# Patient Record
Sex: FEMALE | Race: BLACK OR AFRICAN AMERICAN | NOT HISPANIC OR LATINO | Employment: FULL TIME | URBAN - METROPOLITAN AREA
[De-identification: names, ages, dates, MRNs, and addresses within clinical notes are randomized per-mention and may not be internally consistent; named-entity substitution may affect disease eponyms.]

---

## 2020-09-10 ENCOUNTER — HOSPITAL ENCOUNTER (OUTPATIENT)
Dept: RADIOLOGY | Facility: HOSPITAL | Age: 55
Discharge: HOME | End: 2020-09-10
Attending: INTERNAL MEDICINE
Payer: COMMERCIAL

## 2020-09-10 ENCOUNTER — OFFICE VISIT (OUTPATIENT)
Dept: RHEUMATOLOGY | Facility: CLINIC | Age: 55
End: 2020-09-10
Payer: COMMERCIAL

## 2020-09-10 VITALS
DIASTOLIC BLOOD PRESSURE: 85 MMHG | HEART RATE: 64 BPM | SYSTOLIC BLOOD PRESSURE: 131 MMHG | WEIGHT: 191.81 LBS | HEIGHT: 64 IN | BODY MASS INDEX: 32.74 KG/M2

## 2020-09-10 DIAGNOSIS — M54.2 NECK PAIN, ACUTE: ICD-10-CM

## 2020-09-10 DIAGNOSIS — M06.00 SERONEGATIVE RHEUMATOID ARTHRITIS: ICD-10-CM

## 2020-09-10 DIAGNOSIS — M79.643 PAIN OF HAND, UNSPECIFIED LATERALITY: ICD-10-CM

## 2020-09-10 DIAGNOSIS — G89.4 CHRONIC PAIN SYNDROME: ICD-10-CM

## 2020-09-10 DIAGNOSIS — M06.00 SERONEGATIVE RHEUMATOID ARTHRITIS: Primary | ICD-10-CM

## 2020-09-10 DIAGNOSIS — M15.9 OSTEOARTHRITIS OF MULTIPLE JOINTS, UNSPECIFIED OSTEOARTHRITIS TYPE: ICD-10-CM

## 2020-09-10 PROCEDURE — 77077 XR ARTHRITIS SURVEY: ICD-10-PCS | Mod: 26,,, | Performed by: RADIOLOGY

## 2020-09-10 PROCEDURE — 99999 PR PBB SHADOW E&M-NEW PATIENT-LVL III: CPT | Mod: PBBFAC,,, | Performed by: INTERNAL MEDICINE

## 2020-09-10 PROCEDURE — 96372 THER/PROPH/DIAG INJ SC/IM: CPT | Mod: 59,S$GLB,, | Performed by: INTERNAL MEDICINE

## 2020-09-10 PROCEDURE — 96372 PR INJECTION,THERAP/PROPH/DIAG2ST, IM OR SUBCUT: ICD-10-PCS | Mod: 59,S$GLB,, | Performed by: INTERNAL MEDICINE

## 2020-09-10 PROCEDURE — 99205 OFFICE O/P NEW HI 60 MIN: CPT | Mod: 25,S$GLB,, | Performed by: INTERNAL MEDICINE

## 2020-09-10 PROCEDURE — 99205 PR OFFICE/OUTPT VISIT, NEW, LEVL V, 60-74 MIN: ICD-10-PCS | Mod: 25,S$GLB,, | Performed by: INTERNAL MEDICINE

## 2020-09-10 PROCEDURE — 3008F PR BODY MASS INDEX (BMI) DOCUMENTED: ICD-10-PCS | Mod: CPTII,S$GLB,, | Performed by: INTERNAL MEDICINE

## 2020-09-10 PROCEDURE — 3008F BODY MASS INDEX DOCD: CPT | Mod: CPTII,S$GLB,, | Performed by: INTERNAL MEDICINE

## 2020-09-10 PROCEDURE — 77077 JOINT SURVEY SINGLE VIEW: CPT | Mod: TC,PO

## 2020-09-10 PROCEDURE — 77077 JOINT SURVEY SINGLE VIEW: CPT | Mod: 26,,, | Performed by: RADIOLOGY

## 2020-09-10 PROCEDURE — 99999 PR PBB SHADOW E&M-NEW PATIENT-LVL III: ICD-10-PCS | Mod: PBBFAC,,, | Performed by: INTERNAL MEDICINE

## 2020-09-10 RX ORDER — AMLODIPINE BESYLATE 5 MG/1
10 TABLET ORAL DAILY
COMMUNITY
End: 2023-08-28 | Stop reason: SDUPTHER

## 2020-09-10 RX ORDER — AMOXICILLIN 500 MG
CAPSULE ORAL DAILY
COMMUNITY

## 2020-09-10 RX ORDER — CETIRIZINE HYDROCHLORIDE 10 MG/1
10 TABLET ORAL DAILY
COMMUNITY
Start: 2020-08-29 | End: 2024-03-01 | Stop reason: SDUPTHER

## 2020-09-10 RX ORDER — KETOROLAC TROMETHAMINE 30 MG/ML
60 INJECTION, SOLUTION INTRAMUSCULAR; INTRAVENOUS
Status: COMPLETED | OUTPATIENT
Start: 2020-09-10 | End: 2020-09-10

## 2020-09-10 RX ORDER — L. ACIDOPHILUS/L.BULGARICUS 100MM CELL
1 GRANULES IN PACKET (EA) ORAL 2 TIMES DAILY
COMMUNITY

## 2020-09-10 RX ORDER — POTASSIUM CHLORIDE 1500 MG/1
20 TABLET, EXTENDED RELEASE ORAL DAILY
COMMUNITY
Start: 2020-07-29 | End: 2021-06-18 | Stop reason: SDUPTHER

## 2020-09-10 RX ORDER — GABAPENTIN 600 MG/1
600 TABLET ORAL 2 TIMES DAILY
COMMUNITY
Start: 2020-07-29 | End: 2022-07-29

## 2020-09-10 RX ORDER — CYANOCOBALAMIN 1000 UG/ML
1000 INJECTION, SOLUTION INTRAMUSCULAR; SUBCUTANEOUS
Status: COMPLETED | OUTPATIENT
Start: 2020-09-10 | End: 2020-09-10

## 2020-09-10 RX ORDER — METOPROLOL TARTRATE 25 MG/1
25 TABLET, FILM COATED ORAL DAILY
COMMUNITY
Start: 2020-06-15 | End: 2023-08-28 | Stop reason: SDUPTHER

## 2020-09-10 RX ORDER — HYDROCODONE BITARTRATE AND ACETAMINOPHEN 7.5; 325 MG/1; MG/1
1 TABLET ORAL EVERY 8 HOURS PRN
Qty: 90 TABLET | Refills: 0 | Status: SHIPPED | OUTPATIENT
Start: 2020-09-10 | End: 2020-10-10

## 2020-09-10 RX ORDER — SULFASALAZINE 500 MG/1
1000 TABLET, DELAYED RELEASE ORAL 2 TIMES DAILY
Qty: 120 TABLET | Refills: 3 | Status: SHIPPED | OUTPATIENT
Start: 2020-09-10 | End: 2020-10-10

## 2020-09-10 RX ORDER — PREDNISONE 2.5 MG/1
7.5 TABLET ORAL DAILY
Qty: 90 TABLET | Refills: 0 | Status: SHIPPED | OUTPATIENT
Start: 2020-09-10 | End: 2020-10-10

## 2020-09-10 RX ORDER — CHOLECALCIFEROL (VITAMIN D3) 25 MCG
1000 TABLET ORAL DAILY
COMMUNITY

## 2020-09-10 RX ORDER — METHYLPREDNISOLONE ACETATE 80 MG/ML
80 INJECTION, SUSPENSION INTRA-ARTICULAR; INTRALESIONAL; INTRAMUSCULAR; SOFT TISSUE
Status: COMPLETED | OUTPATIENT
Start: 2020-09-10 | End: 2020-09-10

## 2020-09-10 RX ORDER — DEXAMETHASONE SODIUM PHOSPHATE 4 MG/ML
4 INJECTION, SOLUTION INTRA-ARTICULAR; INTRALESIONAL; INTRAMUSCULAR; INTRAVENOUS; SOFT TISSUE
Status: COMPLETED | OUTPATIENT
Start: 2020-09-10 | End: 2020-09-10

## 2020-09-10 RX ORDER — FUROSEMIDE 20 MG/1
20 TABLET ORAL
COMMUNITY
Start: 2020-08-25 | End: 2023-09-27 | Stop reason: SDUPTHER

## 2020-09-10 RX ADMIN — DEXAMETHASONE SODIUM PHOSPHATE 4 MG: 4 INJECTION, SOLUTION INTRA-ARTICULAR; INTRALESIONAL; INTRAMUSCULAR; INTRAVENOUS; SOFT TISSUE at 01:09

## 2020-09-10 RX ADMIN — CYANOCOBALAMIN 1000 MCG: 1000 INJECTION, SOLUTION INTRAMUSCULAR; SUBCUTANEOUS at 01:09

## 2020-09-10 RX ADMIN — METHYLPREDNISOLONE ACETATE 80 MG: 80 INJECTION, SUSPENSION INTRA-ARTICULAR; INTRALESIONAL; INTRAMUSCULAR; SOFT TISSUE at 01:09

## 2020-09-10 RX ADMIN — KETOROLAC TROMETHAMINE 60 MG: 30 INJECTION, SOLUTION INTRAMUSCULAR; INTRAVENOUS at 01:09

## 2020-09-10 ASSESSMENT — ROUTINE ASSESSMENT OF PATIENT INDEX DATA (RAPID3)
MDHAQ FUNCTION SCORE: 1.1
TOTAL RAPID3 SCORE: 5.55
PSYCHOLOGICAL DISTRESS SCORE: 2.2
PAIN SCORE: 6.5
FATIGUE SCORE: 2.2
PATIENT GLOBAL ASSESSMENT SCORE: 6.5

## 2020-09-10 NOTE — PROGRESS NOTES
Subjective:       Patient ID: Susana Andersen is a 55 y.o. female.    Chief Complaint: Rheumatoid Arthritis    Hpi: pt is a 54 yo female with a h/o  Joint pain ,  Am gelling 30 min with a shower,  Patient complains of arthralgias and myalgias for which has been present for a few years. Pain is located in multiple joints, both shoulder(s), both elbow(s), both wrist(s), both MCP(s): 1st, 2nd, 3rd, 4th and 5th, both PIP(s): 1st, 2nd, 3rd, 4th and 5th, both DIP(s): 1st and 2nd, both hip(s), both knee(s) and both MTP(s): 1st, 2nd, 3rd, 4th and 5th, is described as aching, pulsating, shooting and throbbing, and is constant, moderate .  Associated symptoms include: crepitation, decreased range of motion, edema, effusion, tenderness and warmth.      Review of Systems   Constitutional: Negative for activity change, appetite change, chills, diaphoresis, fatigue, fever and unexpected weight change.   HENT: Negative for congestion, dental problem, ear discharge, ear pain, facial swelling, mouth sores, nosebleeds, postnasal drip, rhinorrhea, sinus pressure, sneezing, sore throat, tinnitus, trouble swallowing and voice change.    Eyes: Negative for photophobia, pain, discharge, redness and itching.   Respiratory: Negative for apnea, cough, chest tightness, shortness of breath and wheezing.    Cardiovascular: Positive for leg swelling. Negative for chest pain and palpitations.   Gastrointestinal: Negative for abdominal distention, abdominal pain, constipation, diarrhea, nausea and vomiting.   Endocrine: Negative for cold intolerance, heat intolerance, polydipsia and polyuria.   Genitourinary: Negative for decreased urine volume, difficulty urinating, dysuria, flank pain, frequency, hematuria and urgency.   Musculoskeletal: Positive for arthralgias, back pain, gait problem, joint swelling, myalgias, neck pain and neck stiffness.   Skin: Negative for pallor, rash and wound.   Allergic/Immunologic: Negative for immunocompromised  "state.   Neurological: Negative for dizziness, tremors, weakness, numbness and headaches.   Hematological: Negative for adenopathy. Does not bruise/bleed easily.   Psychiatric/Behavioral: Negative for sleep disturbance. The patient is not nervous/anxious.          Objective:   /85   Pulse 64   Ht 5' 4" (1.626 m)   Wt 87 kg (191 lb 12.8 oz)   BMI 32.92 kg/m²      Physical Exam   Nursing note and vitals reviewed.  Constitutional: She is oriented to person, place, and time and well-developed, well-nourished, and in no distress.   HENT:   Head: Normocephalic and atraumatic.   Mouth/Throat: Oropharynx is clear and moist.   Eyes: EOM are normal. Pupils are equal, round, and reactive to light.   Neck: Neck supple. No thyromegaly present.   Cardiovascular: Normal rate, regular rhythm and normal heart sounds.  Exam reveals no gallop and no friction rub.    No murmur heard.  Pulmonary/Chest: Breath sounds normal. She has no wheezes. She has no rales. She exhibits no tenderness.   Abdominal: There is no abdominal tenderness. There is no rebound and no guarding.       Right Side Rheumatological Exam     Examination finds the elbow normal.    The patient is tender to palpation of the shoulder, wrist, knee, 1st PIP, 1st MCP, 2nd PIP, 2nd MCP, 3rd PIP, 3rd MCP, 4th PIP, 4th MCP, 5th PIP and 5th MCP    She has swelling of the 1st PIP, 1st MCP, 2nd PIP, 2nd MCP, 3rd PIP, 3rd MCP, 4th PIP, 4th MCP, 5th PIP and 5th MCP    Shoulder Exam   Tenderness Location: no tenderness    Range of Motion   Active abduction: abnormal   Adduction: abnormal  Sensation: normal    Knee Exam   Patellofemoral Crepitus: positive  Effusion: negative  Sensation: normal    Hip Exam   Tenderness Location: posterior  Sensation: normal    Elbow/Wrist Exam   Tenderness Location: no tenderness  Sensation: normal    Left Side Rheumatological Exam     Examination finds the elbow normal.    The patient is tender to palpation of the shoulder, wrist, knee, " 1st PIP, 1st MCP, 2nd PIP, 2nd MCP, 3rd PIP, 3rd MCP, 4th PIP, 4th MCP, 5th PIP and 5th MCP.    She has swelling of the 1st PIP, 1st MCP, 2nd PIP, 2nd MCP, 3rd PIP, 3rd MCP, 4th PIP, 4th MCP, 5th PIP and 5th MCP    Shoulder Exam   Tenderness Location: no tenderness    Range of Motion   Active abduction: abnormal   Sensation: normal    Knee Exam     Patellofemoral Crepitus: positive  Effusion: negative  Sensation: normal    Hip Exam   Tenderness Location: posterior  Sensation: normal    Elbow/Wrist Exam   Sensation: normal      Back/Neck Exam   General Inspection   Gait: normal         Lymphadenopathy:     She has no cervical adenopathy.   Neurological: She is alert and oriented to person, place, and time. Gait normal.   Skin: No rash noted. No erythema. No pallor.     Psychiatric: Mood and affect normal.      Result Impression   1. No acute radiographic abnormalities.  2. Mild multilevel degenerative facet disease, with changes of mild degenerative disc disease at C5-6.    This report was signed by Danis Galaviz MD on 6/12/2018 6:59 AM.    Result Narrative   Cervical spine series    CLINICAL DATA: Neck pain    FINDINGS: AP, lateral, swimmer's, and odontoid views are submitted. The AP and odontoid views are limited by overlying hair pins, which patient would not remove.    There is no evidence of fracture or subluxation. Prevertebral soft tissues are normal. The odontoid is grossly normal.    There is mild loss of intervertebral disc height at C5-6 compatible with degenerative disc disease. Changes of mild multilevel degenerative facet disease are also noted.   Other Result Information   [facility], [facility] - 06/12/2018  7:01 AM CDT  Cervical spine series    CLINICAL DATA: Neck pain          Assessment:       1. Seronegative rheumatoid arthritis    2. Pain of hand, unspecified laterality    3. Neck pain, acute    4. Osteoarthritis of multiple joints, unspecified osteoarthritis  type    5. Chronic pain syndrome            Plan:       Susana was seen today for rheumatoid arthritis.    Diagnoses and all orders for this visit:    Seronegative rheumatoid arthritis  -     JENNIE Screen w/Reflex; Future  -     Anti Sm/RNP Antibody; Future  -     Anti-DNA antibody, double-stranded; Future  -     Anti-Histone Antibody; Future  -     Anti-Smith antibody; Future  -     Anti-thyroglobulin antibody; Future  -     CBC auto differential; Future  -     Comprehensive metabolic panel; Future  -     COVID-19 (SARS CoV-2) IgG Antibody; Future  -     C-Reactive Protein; Future  -     Cyclic Citrullinated Peptide Antibody, IgG; Future  -     Hepatitis B core antibody, IgM; Future  -     Hepatitis B Surface Ab, Qualitative; Future  -     Hepatitis B Surface Antigen; Future  -     Hepatitis C Antibody; Future  -     Sjogrens syndrome-A extractable nuclear antibody; Future  -     Sjogrens syndrome-B extractable nuclear antibody; Future  -     TSH; Future  -     Thyroid Peroxidase Antibody; Future  -     T4, free; Future  -     T3, free; Future  -     Sedimentation rate; Future  -     Rheumatoid factor; Future  -     Quantiferon Gold TB; Future  -     Vitamin D; Future  -     XR Arthritis Survey; Future  -     dexamethasone injection 4 mg  -     methylPREDNISolone acetate injection 80 mg  -     ketorolac injection 60 mg  -     cyanocobalamin injection 1,000 mcg  -     predniSONE (DELTASONE) 2.5 MG tablet; Take 3 tablets (7.5 mg total) by mouth once daily.  -     sulfaSALAzine (AZULFIDINE) 500 MG EC tablet; Take 2 tablets (1,000 mg total) by mouth 2 (two) times daily.  -     HYDROcodone-acetaminophen (NORCO) 7.5-325 mg per tablet; Take 1 tablet by mouth every 8 (eight) hours as needed.    Pain of hand, unspecified laterality  -     JENNIE Screen w/Reflex; Future  -     Anti Sm/RNP Antibody; Future  -     Anti-DNA antibody, double-stranded; Future  -     Anti-Histone Antibody; Future  -     Anti-Smith antibody; Future  -      Anti-thyroglobulin antibody; Future  -     CBC auto differential; Future  -     Comprehensive metabolic panel; Future  -     COVID-19 (SARS CoV-2) IgG Antibody; Future  -     C-Reactive Protein; Future  -     Cyclic Citrullinated Peptide Antibody, IgG; Future  -     Hepatitis B core antibody, IgM; Future  -     Hepatitis B Surface Ab, Qualitative; Future  -     Hepatitis B Surface Antigen; Future  -     Hepatitis C Antibody; Future  -     Sjogrens syndrome-A extractable nuclear antibody; Future  -     Sjogrens syndrome-B extractable nuclear antibody; Future  -     TSH; Future  -     Thyroid Peroxidase Antibody; Future  -     T4, free; Future  -     T3, free; Future  -     Sedimentation rate; Future  -     Rheumatoid factor; Future  -     Quantiferon Gold TB; Future  -     Vitamin D; Future  -     XR Arthritis Survey; Future  -     dexamethasone injection 4 mg  -     methylPREDNISolone acetate injection 80 mg  -     ketorolac injection 60 mg  -     cyanocobalamin injection 1,000 mcg  -     predniSONE (DELTASONE) 2.5 MG tablet; Take 3 tablets (7.5 mg total) by mouth once daily.  -     sulfaSALAzine (AZULFIDINE) 500 MG EC tablet; Take 2 tablets (1,000 mg total) by mouth 2 (two) times daily.  -     HYDROcodone-acetaminophen (NORCO) 7.5-325 mg per tablet; Take 1 tablet by mouth every 8 (eight) hours as needed.    Neck pain, acute  -     JENNIE Screen w/Reflex; Future  -     Anti Sm/RNP Antibody; Future  -     Anti-DNA antibody, double-stranded; Future  -     Anti-Histone Antibody; Future  -     Anti-Smith antibody; Future  -     Anti-thyroglobulin antibody; Future  -     CBC auto differential; Future  -     Comprehensive metabolic panel; Future  -     COVID-19 (SARS CoV-2) IgG Antibody; Future  -     C-Reactive Protein; Future  -     Cyclic Citrullinated Peptide Antibody, IgG; Future  -     Hepatitis B core antibody, IgM; Future  -     Hepatitis B Surface Ab, Qualitative; Future  -     Hepatitis B Surface Antigen;  Future  -     Hepatitis C Antibody; Future  -     Sjogrens syndrome-A extractable nuclear antibody; Future  -     Sjogrens syndrome-B extractable nuclear antibody; Future  -     TSH; Future  -     Thyroid Peroxidase Antibody; Future  -     T4, free; Future  -     T3, free; Future  -     Sedimentation rate; Future  -     Rheumatoid factor; Future  -     Quantiferon Gold TB; Future  -     Vitamin D; Future  -     XR Arthritis Survey; Future  -     dexamethasone injection 4 mg  -     methylPREDNISolone acetate injection 80 mg  -     ketorolac injection 60 mg  -     cyanocobalamin injection 1,000 mcg  -     predniSONE (DELTASONE) 2.5 MG tablet; Take 3 tablets (7.5 mg total) by mouth once daily.  -     sulfaSALAzine (AZULFIDINE) 500 MG EC tablet; Take 2 tablets (1,000 mg total) by mouth 2 (two) times daily.  -     HYDROcodone-acetaminophen (NORCO) 7.5-325 mg per tablet; Take 1 tablet by mouth every 8 (eight) hours as needed.    Osteoarthritis of multiple joints, unspecified osteoarthritis type  -     JENNIE Screen w/Reflex; Future  -     Anti Sm/RNP Antibody; Future  -     Anti-DNA antibody, double-stranded; Future  -     Anti-Histone Antibody; Future  -     Anti-Smith antibody; Future  -     Anti-thyroglobulin antibody; Future  -     CBC auto differential; Future  -     Comprehensive metabolic panel; Future  -     COVID-19 (SARS CoV-2) IgG Antibody; Future  -     C-Reactive Protein; Future  -     Cyclic Citrullinated Peptide Antibody, IgG; Future  -     Hepatitis B core antibody, IgM; Future  -     Hepatitis B Surface Ab, Qualitative; Future  -     Hepatitis B Surface Antigen; Future  -     Hepatitis C Antibody; Future  -     Sjogrens syndrome-A extractable nuclear antibody; Future  -     Sjogrens syndrome-B extractable nuclear antibody; Future  -     TSH; Future  -     Thyroid Peroxidase Antibody; Future  -     T4, free; Future  -     T3, free; Future  -     Sedimentation rate; Future  -     Rheumatoid factor; Future  -      Quantiferon Gold TB; Future  -     Vitamin D; Future  -     XR Arthritis Survey; Future  -     dexamethasone injection 4 mg  -     methylPREDNISolone acetate injection 80 mg  -     ketorolac injection 60 mg  -     cyanocobalamin injection 1,000 mcg  -     predniSONE (DELTASONE) 2.5 MG tablet; Take 3 tablets (7.5 mg total) by mouth once daily.  -     sulfaSALAzine (AZULFIDINE) 500 MG EC tablet; Take 2 tablets (1,000 mg total) by mouth 2 (two) times daily.  -     HYDROcodone-acetaminophen (NORCO) 7.5-325 mg per tablet; Take 1 tablet by mouth every 8 (eight) hours as needed.    Chronic pain syndrome  -     predniSONE (DELTASONE) 2.5 MG tablet; Take 3 tablets (7.5 mg total) by mouth once daily.  -     sulfaSALAzine (AZULFIDINE) 500 MG EC tablet; Take 2 tablets (1,000 mg total) by mouth 2 (two) times daily.  -     HYDROcodone-acetaminophen (NORCO) 7.5-325 mg per tablet; Take 1 tablet by mouth every 8 (eight) hours as needed.      More than 50% of the 60 minute encounter was spent face to face counseling the patient regarding current status and future plan of care as well as side of the medications. All questions were answered to patient's satisfaction

## 2020-10-19 ENCOUNTER — OFFICE VISIT (OUTPATIENT)
Dept: RHEUMATOLOGY | Facility: CLINIC | Age: 55
End: 2020-10-19
Payer: COMMERCIAL

## 2020-10-19 VITALS
DIASTOLIC BLOOD PRESSURE: 81 MMHG | HEART RATE: 58 BPM | HEIGHT: 64 IN | SYSTOLIC BLOOD PRESSURE: 125 MMHG | BODY MASS INDEX: 32.74 KG/M2 | WEIGHT: 191.81 LBS

## 2020-10-19 DIAGNOSIS — M06.00 SERONEGATIVE RHEUMATOID ARTHRITIS: Primary | ICD-10-CM

## 2020-10-19 DIAGNOSIS — F40.231 SEVERE NEEDLE PHOBIA: ICD-10-CM

## 2020-10-19 DIAGNOSIS — M15.9 OSTEOARTHRITIS OF MULTIPLE JOINTS, UNSPECIFIED OSTEOARTHRITIS TYPE: ICD-10-CM

## 2020-10-19 DIAGNOSIS — G89.4 CHRONIC PAIN SYNDROME: ICD-10-CM

## 2020-10-19 PROCEDURE — 3008F BODY MASS INDEX DOCD: CPT | Mod: CPTII,S$GLB,, | Performed by: INTERNAL MEDICINE

## 2020-10-19 PROCEDURE — 99215 OFFICE O/P EST HI 40 MIN: CPT | Mod: 25,S$GLB,, | Performed by: INTERNAL MEDICINE

## 2020-10-19 PROCEDURE — 99999 PR PBB SHADOW E&M-EST. PATIENT-LVL III: CPT | Mod: PBBFAC,,, | Performed by: INTERNAL MEDICINE

## 2020-10-19 PROCEDURE — 96372 PR INJECTION,THERAP/PROPH/DIAG2ST, IM OR SUBCUT: ICD-10-PCS | Mod: S$GLB,,, | Performed by: INTERNAL MEDICINE

## 2020-10-19 PROCEDURE — 99999 PR PBB SHADOW E&M-EST. PATIENT-LVL III: ICD-10-PCS | Mod: PBBFAC,,, | Performed by: INTERNAL MEDICINE

## 2020-10-19 PROCEDURE — 99215 PR OFFICE/OUTPT VISIT, EST, LEVL V, 40-54 MIN: ICD-10-PCS | Mod: 25,S$GLB,, | Performed by: INTERNAL MEDICINE

## 2020-10-19 PROCEDURE — 96372 THER/PROPH/DIAG INJ SC/IM: CPT | Mod: S$GLB,,, | Performed by: INTERNAL MEDICINE

## 2020-10-19 PROCEDURE — 3008F PR BODY MASS INDEX (BMI) DOCUMENTED: ICD-10-PCS | Mod: CPTII,S$GLB,, | Performed by: INTERNAL MEDICINE

## 2020-10-19 RX ORDER — HYDROCODONE BITARTRATE AND ACETAMINOPHEN 10; 325 MG/1; MG/1
1 TABLET ORAL EVERY 8 HOURS PRN
Qty: 90 TABLET | Refills: 0 | Status: SHIPPED | OUTPATIENT
Start: 2020-12-18 | End: 2020-10-19 | Stop reason: SDUPTHER

## 2020-10-19 RX ORDER — HYDROCODONE BITARTRATE AND ACETAMINOPHEN 10; 325 MG/1; MG/1
1 TABLET ORAL EVERY 8 HOURS PRN
Qty: 90 TABLET | Refills: 0 | Status: SHIPPED | OUTPATIENT
Start: 2020-11-19 | End: 2020-10-19 | Stop reason: SDUPTHER

## 2020-10-19 RX ORDER — HYDROCODONE BITARTRATE AND ACETAMINOPHEN 10; 325 MG/1; MG/1
1 TABLET ORAL EVERY 8 HOURS PRN
Qty: 90 TABLET | Refills: 0 | Status: SHIPPED | OUTPATIENT
Start: 2020-10-19 | End: 2020-10-19 | Stop reason: SDUPTHER

## 2020-10-19 RX ORDER — HYDROCHLOROTHIAZIDE 25 MG/1
TABLET ORAL
COMMUNITY
Start: 2020-10-12 | End: 2023-08-28 | Stop reason: SDUPTHER

## 2020-10-19 RX ORDER — METHYLPREDNISOLONE ACETATE 80 MG/ML
80 INJECTION, SUSPENSION INTRA-ARTICULAR; INTRALESIONAL; INTRAMUSCULAR; SOFT TISSUE
Status: COMPLETED | OUTPATIENT
Start: 2020-10-19 | End: 2020-10-19

## 2020-10-19 RX ORDER — HYDROCODONE BITARTRATE AND ACETAMINOPHEN 7.5; 325 MG/1; MG/1
1 TABLET ORAL EVERY 6 HOURS PRN
COMMUNITY
End: 2020-10-19

## 2020-10-19 RX ORDER — PREDNISONE 2.5 MG/1
2.5 TABLET ORAL DAILY
COMMUNITY
End: 2021-03-08

## 2020-10-19 RX ORDER — CYANOCOBALAMIN 1000 UG/ML
1000 INJECTION, SOLUTION INTRAMUSCULAR; SUBCUTANEOUS
Status: COMPLETED | OUTPATIENT
Start: 2020-10-19 | End: 2020-10-19

## 2020-10-19 RX ORDER — DEXAMETHASONE SODIUM PHOSPHATE 4 MG/ML
4 INJECTION, SOLUTION INTRA-ARTICULAR; INTRALESIONAL; INTRAMUSCULAR; INTRAVENOUS; SOFT TISSUE
Status: COMPLETED | OUTPATIENT
Start: 2020-10-19 | End: 2020-10-19

## 2020-10-19 RX ORDER — HYDROCODONE BITARTRATE AND ACETAMINOPHEN 10; 325 MG/1; MG/1
1 TABLET ORAL EVERY 8 HOURS PRN
Qty: 90 TABLET | Refills: 0 | Status: SHIPPED | OUTPATIENT
Start: 2020-12-19 | End: 2021-01-14 | Stop reason: SDUPTHER

## 2020-10-19 RX ORDER — SULFADIAZINE 500 MG/1
1 TABLET ORAL 2 TIMES DAILY
COMMUNITY
End: 2021-02-26

## 2020-10-19 RX ORDER — MUPIROCIN 20 MG/G
OINTMENT TOPICAL
COMMUNITY
Start: 2020-09-24

## 2020-10-19 RX ORDER — UPADACITINIB 15 MG/1
15 TABLET, EXTENDED RELEASE ORAL DAILY
Qty: 30 TABLET | Refills: 12 | Status: SHIPPED | OUTPATIENT
Start: 2020-10-19 | End: 2020-11-11

## 2020-10-19 RX ORDER — KETOROLAC TROMETHAMINE 30 MG/ML
60 INJECTION, SOLUTION INTRAMUSCULAR; INTRAVENOUS
Status: COMPLETED | OUTPATIENT
Start: 2020-10-19 | End: 2020-10-19

## 2020-10-19 RX ADMIN — DEXAMETHASONE SODIUM PHOSPHATE 4 MG: 4 INJECTION, SOLUTION INTRA-ARTICULAR; INTRALESIONAL; INTRAMUSCULAR; INTRAVENOUS; SOFT TISSUE at 04:10

## 2020-10-19 RX ADMIN — KETOROLAC TROMETHAMINE 60 MG: 30 INJECTION, SOLUTION INTRAMUSCULAR; INTRAVENOUS at 04:10

## 2020-10-19 RX ADMIN — CYANOCOBALAMIN 1000 MCG: 1000 INJECTION, SOLUTION INTRAMUSCULAR; SUBCUTANEOUS at 04:10

## 2020-10-19 RX ADMIN — METHYLPREDNISOLONE ACETATE 80 MG: 80 INJECTION, SUSPENSION INTRA-ARTICULAR; INTRALESIONAL; INTRAMUSCULAR; SOFT TISSUE at 04:10

## 2020-10-19 ASSESSMENT — ROUTINE ASSESSMENT OF PATIENT INDEX DATA (RAPID3)
PAIN SCORE: 7
TOTAL RAPID3 SCORE: 5.22
PATIENT GLOBAL ASSESSMENT SCORE: 7
MDHAQ FUNCTION SCORE: 0.5
PSYCHOLOGICAL DISTRESS SCORE: 0
FATIGUE SCORE: 2.2

## 2020-10-19 NOTE — PROGRESS NOTES
Administered 1 cc ( 1000 mcg/ml ) of b12 to the right upper outer gluteal. Informed of s/s to report verbalized understanding. No adverse reactions noted.      Administered 1 cc dexamethasone 4mg/cc  to right upper outer gluteal. Pt tolerated well. No acute reaction noted to site. Pt instructed on S/S to report. Pt verbalized understanding.         Administered 1 cc ( 80 mg/ml ) of depomedrol to the right upper outer gluteal. Informed of s/s to report verbalized understanding. No adverse reactions noted.        Administered 2 cc ( 30 mg/ml ) of toradol to the left upper outer gluteal. Informed of s/s to report verbalized understanding. No adverse reactions noted.

## 2020-10-19 NOTE — PROGRESS NOTES
Subjective:       Patient ID: Susana Andersen is a 55 y.o. female.    Chief Complaint: Disease Management and Rheumatoid Arthritis    Follow up: 56 yo female  RA, Am gelling 45, Patient complains of arthralgias and myalgias for which has been present for a few years. Pain is located in multiple joints, both shoulder(s), both elbow(s), both wrist(s), both MCP(s): 1st, 2nd, 3rd, 4th and 5th, both PIP(s): 1st, 2nd, 3rd, 4th and 5th, both DIP(s): 1st and 2nd, both hip(s), both knee(s) and both MTP(s): 1st, 2nd, 3rd, 4th and 5th, is described as aching, pulsating, shooting and throbbing, and is constant, moderate .  Associated symptoms include: crepitation, decreased range of motion, edema, effusion, tenderness and warmth.  Azulfidine helped mildly.            She complains of joint swelling. Associated symptoms include myalgias. Pertinent negatives include no dysuria, fatigue, fever, trouble swallowing or headaches.         Review of Systems   Constitutional: Negative for activity change, appetite change, chills, diaphoresis, fatigue, fever and unexpected weight change.   HENT: Negative for congestion, dental problem, ear discharge, ear pain, facial swelling, mouth sores, nosebleeds, postnasal drip, rhinorrhea, sinus pressure, sneezing, sore throat, tinnitus, trouble swallowing and voice change.    Eyes: Negative for photophobia, pain, discharge, redness and itching.   Respiratory: Negative for apnea, cough, chest tightness, shortness of breath and wheezing.    Cardiovascular: Positive for leg swelling. Negative for chest pain and palpitations.   Gastrointestinal: Negative for abdominal distention, abdominal pain, constipation, diarrhea, nausea and vomiting.   Endocrine: Negative for cold intolerance, heat intolerance, polydipsia and polyuria.   Genitourinary: Negative for decreased urine volume, difficulty urinating, dysuria, flank pain, frequency, hematuria and urgency.   Musculoskeletal: Positive for arthralgias,  "back pain, gait problem, joint swelling, myalgias, neck pain and neck stiffness.   Skin: Negative for pallor, rash and wound.   Allergic/Immunologic: Negative for immunocompromised state.   Neurological: Negative for dizziness, tremors, weakness, numbness and headaches.   Hematological: Negative for adenopathy. Does not bruise/bleed easily.   Psychiatric/Behavioral: Negative for sleep disturbance. The patient is not nervous/anxious.          Objective:   /81 (BP Location: Left arm, Patient Position: Sitting, BP Method: Medium (Automatic))   Pulse (!) 58   Ht 5' 4" (1.626 m)   Wt 87 kg (191 lb 12.8 oz)   BMI 32.92 kg/m²      Physical Exam   Nursing note and vitals reviewed.  Constitutional: She is oriented to person, place, and time and well-developed, well-nourished, and in no distress.   HENT:   Head: Normocephalic and atraumatic.   Mouth/Throat: Oropharynx is clear and moist.   Eyes: EOM are normal. Pupils are equal, round, and reactive to light.   Neck: Neck supple. No thyromegaly present.   Cardiovascular: Normal rate, regular rhythm and normal heart sounds.  Exam reveals no gallop and no friction rub.    No murmur heard.  Pulmonary/Chest: Breath sounds normal. She has no wheezes. She has no rales. She exhibits no tenderness.   Abdominal: There is no abdominal tenderness. There is no rebound and no guarding.       Right Side Rheumatological Exam     Examination finds the elbow normal.    The patient is tender to palpation of the shoulder, wrist, knee, 1st PIP, 1st MCP, 2nd PIP, 2nd MCP, 3rd PIP, 3rd MCP, 4th PIP, 4th MCP, 5th PIP and 5th MCP    She has swelling of the 1st PIP, 1st MCP, 2nd PIP, 2nd MCP, 3rd PIP, 3rd MCP, 4th PIP, 4th MCP, 5th PIP and 5th MCP    Shoulder Exam   Tenderness Location: no tenderness    Range of Motion   Active abduction: abnormal   Adduction: abnormal  Sensation: normal    Knee Exam   Patellofemoral Crepitus: positive  Effusion: negative  Sensation: normal    Hip Exam "   Tenderness Location: posterior  Sensation: normal    Elbow/Wrist Exam   Tenderness Location: no tenderness  Sensation: normal    Left Side Rheumatological Exam     Examination finds the elbow normal.    The patient is tender to palpation of the shoulder, wrist, knee, 1st PIP, 1st MCP, 2nd PIP, 2nd MCP, 3rd PIP, 3rd MCP, 4th PIP, 4th MCP, 5th PIP and 5th MCP.    She has swelling of the 1st PIP, 1st MCP, 2nd PIP, 2nd MCP, 3rd PIP, 3rd MCP, 4th PIP, 4th MCP, 5th PIP and 5th MCP    Shoulder Exam   Tenderness Location: no tenderness    Range of Motion   Active abduction: abnormal   Sensation: normal    Knee Exam     Patellofemoral Crepitus: positive  Effusion: negative  Sensation: normal    Hip Exam   Tenderness Location: posterior  Sensation: normal    Elbow/Wrist Exam   Sensation: normal      Back/Neck Exam   General Inspection   Gait: normal         Lymphadenopathy:     She has no cervical adenopathy.   Neurological: She is alert and oriented to person, place, and time. Gait normal.   Skin: No rash noted. No erythema. No pallor.     Psychiatric: Mood and affect normal.      Result Impression   1. No acute radiographic abnormalities.  2. Mild multilevel degenerative facet disease, with changes of mild degenerative disc disease at C5-6.    This report was signed by Danis Glaaviz MD on 6/12/2018 6:59 AM.    Result Narrative   Cervical spine series    CLINICAL DATA: Neck pain    FINDINGS: AP, lateral, swimmer's, and odontoid views are submitted. The AP and odontoid views are limited by overlying hair pins, which patient would not remove.    There is no evidence of fracture or subluxation. Prevertebral soft tissues are normal. The odontoid is grossly normal.    There is mild loss of intervertebral disc height at C5-6 compatible with degenerative disc disease. Changes of mild multilevel degenerative facet disease are also noted.   Other Result Information   [facility], Incoming Imaging Orders/Results To [facility] -  06/12/2018  7:01 AM CDT  Cervical spine series    CLINICAL DATA: Neck pain          Assessment:       1. Seronegative rheumatoid arthritis    2. Osteoarthritis of multiple joints, unspecified osteoarthritis type    3. Chronic pain syndrome    4. Severe needle phobia            Plan:       Susana was seen today for disease management and rheumatoid arthritis.    Diagnoses and all orders for this visit:    Seronegative rheumatoid arthritis  -     upadacitinib (RINVOQ) 15 mg 24 hr tablet; Take 1 tablet (15 mg total) by mouth once daily.  -     Discontinue: HYDROcodone-acetaminophen (NORCO)  mg per tablet; Take 1 tablet by mouth every 8 (eight) hours as needed.  -     Discontinue: HYDROcodone-acetaminophen (NORCO)  mg per tablet; Take 1 tablet by mouth every 8 (eight) hours as needed.  -     HYDROcodone-acetaminophen (NORCO)  mg per tablet; Take 1 tablet by mouth every 8 (eight) hours as needed.  -     methylPREDNISolone acetate injection 80 mg  -     dexamethasone injection 4 mg  -     ketorolac injection 60 mg  -     cyanocobalamin injection 1,000 mcg  -     Prior authorization Order  -     CBC auto differential; Future  -     Comprehensive Metabolic Panel; Future  -     C-Reactive Protein; Future  -     CBC auto differential  -     Comprehensive Metabolic Panel  -     C-Reactive Protein    Osteoarthritis of multiple joints, unspecified osteoarthritis type  -     Discontinue: HYDROcodone-acetaminophen (NORCO)  mg per tablet; Take 1 tablet by mouth every 8 (eight) hours as needed.  -     Discontinue: HYDROcodone-acetaminophen (NORCO)  mg per tablet; Take 1 tablet by mouth every 8 (eight) hours as needed.  -     HYDROcodone-acetaminophen (NORCO)  mg per tablet; Take 1 tablet by mouth every 8 (eight) hours as needed.  -     methylPREDNISolone acetate injection 80 mg  -     dexamethasone injection 4 mg  -     ketorolac injection 60 mg  -     cyanocobalamin injection 1,000 mcg  -      Prior authorization Order  -     CBC auto differential; Future  -     Comprehensive Metabolic Panel; Future  -     C-Reactive Protein; Future  -     CBC auto differential  -     Comprehensive Metabolic Panel  -     C-Reactive Protein    Chronic pain syndrome  -     Discontinue: HYDROcodone-acetaminophen (NORCO)  mg per tablet; Take 1 tablet by mouth every 8 (eight) hours as needed.  -     Discontinue: HYDROcodone-acetaminophen (NORCO)  mg per tablet; Take 1 tablet by mouth every 8 (eight) hours as needed.  -     HYDROcodone-acetaminophen (NORCO)  mg per tablet; Take 1 tablet by mouth every 8 (eight) hours as needed.  -     methylPREDNISolone acetate injection 80 mg  -     dexamethasone injection 4 mg  -     ketorolac injection 60 mg  -     cyanocobalamin injection 1,000 mcg  -     Prior authorization Order  -     CBC auto differential; Future  -     Comprehensive Metabolic Panel; Future  -     C-Reactive Protein; Future  -     CBC auto differential  -     Comprehensive Metabolic Panel  -     C-Reactive Protein    Severe needle phobia  -     upadacitinib (RINVOQ) 15 mg 24 hr tablet; Take 1 tablet (15 mg total) by mouth once daily.  -     Discontinue: HYDROcodone-acetaminophen (NORCO)  mg per tablet; Take 1 tablet by mouth every 8 (eight) hours as needed.  -     Discontinue: HYDROcodone-acetaminophen (NORCO)  mg per tablet; Take 1 tablet by mouth every 8 (eight) hours as needed.  -     HYDROcodone-acetaminophen (NORCO)  mg per tablet; Take 1 tablet by mouth every 8 (eight) hours as needed.  -     methylPREDNISolone acetate injection 80 mg  -     dexamethasone injection 4 mg  -     ketorolac injection 60 mg  -     cyanocobalamin injection 1,000 mcg  -     Prior authorization Order  -     CBC auto differential; Future  -     Comprehensive Metabolic Panel; Future  -     C-Reactive Protein; Future  -     CBC auto differential  -     Comprehensive Metabolic Panel  -     C-Reactive  Protein      More than 50% of the 60 minute encounter was spent face to face counseling the patient regarding current status and future plan of care as well as side of the medications. All questions were answered to patient's satisfaction

## 2020-10-22 ENCOUNTER — SPECIALTY PHARMACY (OUTPATIENT)
Dept: PHARMACY | Facility: CLINIC | Age: 55
End: 2020-10-22

## 2020-10-22 DIAGNOSIS — M06.00 SERONEGATIVE RHEUMATOID ARTHRITIS: ICD-10-CM

## 2020-11-09 NOTE — TELEPHONE ENCOUNTER
Patient and Dr Caro/staff informed that insurance has denied Rinvoq PA.  Submitted appeal to insurance company 11/9/20.

## 2020-11-10 NOTE — TELEPHONE ENCOUNTER
Appeal for Rinvoq approved 10/10/20-11/20/21.  No case # provided.     Insurance letter states pt must fill at [facility] pharmacy.

## 2020-11-11 ENCOUNTER — TELEPHONE (OUTPATIENT)
Dept: RHEUMATOLOGY | Facility: CLINIC | Age: 55
End: 2020-11-11

## 2020-11-11 DIAGNOSIS — M06.00 SERONEGATIVE RHEUMATOID ARTHRITIS: Primary | ICD-10-CM

## 2020-11-11 NOTE — TELEPHONE ENCOUNTER
----- Message from Griselda Espinosa PharmD sent at 11/9/2020  5:33 PM CST -----  Regarding: Joann Caro + staff,     Ms. Andersen's insurance has denied the PA for her Rinvoq, stating the plan prefers for her to try Methotrexate first.  I am submitting an appeal today to the insurance company.    Thank you,  Griselda Espinosa, Gisel  [facility]  [phone]

## 2020-11-11 NOTE — TELEPHONE ENCOUNTER
----- Message from Griselda Espinosa PharmD sent at 11/11/2020  2:37 PM CST -----  Regarding: RE: Rinvoq  Dr Caro and staff,    Ms. Andersen's appeal for Rinvoq was approved.  Rx transferred to [facility] - [place], [place] - [facility] [address] per insurance mandate. Patient is aware.    Thanks,  Griselda Espinosa PharmD  [facility]  [phone]  ----- Message -----  From: Amanda Quezada LPN  Sent: 11/11/2020   2:29 PM CST  To: Griselda Espinosa PharmD  Subject: RE: Rinvoq                                       Thank you keep us up dated.  ----- Message -----  From: Griselda Espinosa PharmD  Sent: 11/9/2020   5:33 PM CST  To: Burt Caro MD, Vania Dallas Staff  Subject: Rinvoq                                           Dr Caro + staff,     Ms. Andersen's insurance has denied the PA for her Rinvoq, stating the plan prefers for her to try Methotrexate first.  I am submitting an appeal today to the insurance company.    Thank you,  Griselda Espinosa PharmD  [facility]  [phone]

## 2020-11-11 NOTE — TELEPHONE ENCOUNTER
Patient notified of Rinvoq approval on appeal and need to fill with [facility]. She gives permission for copay card enrollment to be initiated on her behalf to ensure her copay is no more than $5.00. Explained she will receive an email with processing information and she should then call [facility] to schedule delivery. She will notify [facility] if she has any problems or concerns.

## 2021-01-10 ENCOUNTER — OFFICE VISIT (OUTPATIENT)
Dept: URGENT CARE | Facility: CLINIC | Age: 56
End: 2021-01-10
Payer: COMMERCIAL

## 2021-01-10 VITALS
HEIGHT: 64 IN | TEMPERATURE: 97 F | DIASTOLIC BLOOD PRESSURE: 86 MMHG | RESPIRATION RATE: 18 BRPM | BODY MASS INDEX: 32.95 KG/M2 | WEIGHT: 193 LBS | OXYGEN SATURATION: 97 % | HEART RATE: 63 BPM | SYSTOLIC BLOOD PRESSURE: 155 MMHG

## 2021-01-10 DIAGNOSIS — Z20.828 CONTACT WITH AND (SUSPECTED) EXPOSURE TO OTHER VIRAL COMMUNICABLE DISEASES: Primary | ICD-10-CM

## 2021-01-10 LAB — SARS-COV-2 AG RESP QL IA.RAPID: NEGATIVE

## 2021-01-10 PROCEDURE — 3008F BODY MASS INDEX DOCD: CPT | Mod: S$GLB,,, | Performed by: NURSE PRACTITIONER

## 2021-01-10 PROCEDURE — 87426 SARS CORONAVIRUS 2 ANTIGEN POCT: ICD-10-PCS | Mod: QW,S$GLB,, | Performed by: NURSE PRACTITIONER

## 2021-01-10 PROCEDURE — 99213 OFFICE O/P EST LOW 20 MIN: CPT | Mod: S$GLB,,, | Performed by: NURSE PRACTITIONER

## 2021-01-10 PROCEDURE — 87426 SARSCOV CORONAVIRUS AG IA: CPT | Mod: QW,S$GLB,, | Performed by: NURSE PRACTITIONER

## 2021-01-10 PROCEDURE — 3008F PR BODY MASS INDEX (BMI) DOCUMENTED: ICD-10-PCS | Mod: S$GLB,,, | Performed by: NURSE PRACTITIONER

## 2021-01-10 PROCEDURE — 99213 PR OFFICE/OUTPT VISIT, EST, LEVL III, 20-29 MIN: ICD-10-PCS | Mod: S$GLB,,, | Performed by: NURSE PRACTITIONER

## 2021-01-14 DIAGNOSIS — M06.00 SERONEGATIVE RHEUMATOID ARTHRITIS: ICD-10-CM

## 2021-01-14 DIAGNOSIS — M15.9 OSTEOARTHRITIS OF MULTIPLE JOINTS, UNSPECIFIED OSTEOARTHRITIS TYPE: ICD-10-CM

## 2021-01-14 DIAGNOSIS — F40.231 SEVERE NEEDLE PHOBIA: ICD-10-CM

## 2021-01-14 DIAGNOSIS — G89.4 CHRONIC PAIN SYNDROME: ICD-10-CM

## 2021-01-15 ENCOUNTER — PATIENT MESSAGE (OUTPATIENT)
Dept: RHEUMATOLOGY | Facility: CLINIC | Age: 56
End: 2021-01-15

## 2021-01-15 DIAGNOSIS — G89.4 CHRONIC PAIN SYNDROME: ICD-10-CM

## 2021-01-15 DIAGNOSIS — F40.231 SEVERE NEEDLE PHOBIA: ICD-10-CM

## 2021-01-15 DIAGNOSIS — M06.00 SERONEGATIVE RHEUMATOID ARTHRITIS: ICD-10-CM

## 2021-01-15 DIAGNOSIS — M15.9 OSTEOARTHRITIS OF MULTIPLE JOINTS, UNSPECIFIED OSTEOARTHRITIS TYPE: ICD-10-CM

## 2021-01-15 RX ORDER — HYDROCODONE BITARTRATE AND ACETAMINOPHEN 10; 325 MG/1; MG/1
1 TABLET ORAL EVERY 8 HOURS PRN
Qty: 90 TABLET | Refills: 0 | Status: CANCELLED | OUTPATIENT
Start: 2021-01-15 | End: 2021-02-14

## 2021-01-16 RX ORDER — HYDROCODONE BITARTRATE AND ACETAMINOPHEN 10; 325 MG/1; MG/1
1 TABLET ORAL EVERY 8 HOURS PRN
Qty: 90 TABLET | Refills: 0 | Status: SHIPPED | OUTPATIENT
Start: 2021-01-16 | End: 2021-02-15 | Stop reason: SDUPTHER

## 2021-02-15 DIAGNOSIS — G89.4 CHRONIC PAIN SYNDROME: ICD-10-CM

## 2021-02-15 DIAGNOSIS — M15.9 OSTEOARTHRITIS OF MULTIPLE JOINTS, UNSPECIFIED OSTEOARTHRITIS TYPE: ICD-10-CM

## 2021-02-15 DIAGNOSIS — M06.00 SERONEGATIVE RHEUMATOID ARTHRITIS: ICD-10-CM

## 2021-02-15 DIAGNOSIS — F40.231 SEVERE NEEDLE PHOBIA: ICD-10-CM

## 2021-02-22 RX ORDER — HYDROCODONE BITARTRATE AND ACETAMINOPHEN 10; 325 MG/1; MG/1
1 TABLET ORAL EVERY 8 HOURS PRN
Qty: 90 TABLET | Refills: 0 | Status: SHIPPED | OUTPATIENT
Start: 2021-02-22 | End: 2021-02-26 | Stop reason: SDUPTHER

## 2021-02-26 ENCOUNTER — OFFICE VISIT (OUTPATIENT)
Dept: RHEUMATOLOGY | Facility: CLINIC | Age: 56
End: 2021-02-26
Payer: COMMERCIAL

## 2021-02-26 VITALS
DIASTOLIC BLOOD PRESSURE: 91 MMHG | BODY MASS INDEX: 31.07 KG/M2 | HEART RATE: 58 BPM | SYSTOLIC BLOOD PRESSURE: 139 MMHG | HEIGHT: 64 IN | WEIGHT: 182 LBS

## 2021-02-26 DIAGNOSIS — M06.00 SERONEGATIVE RHEUMATOID ARTHRITIS: Primary | ICD-10-CM

## 2021-02-26 DIAGNOSIS — F40.231 SEVERE NEEDLE PHOBIA: ICD-10-CM

## 2021-02-26 DIAGNOSIS — G89.4 CHRONIC PAIN SYNDROME: ICD-10-CM

## 2021-02-26 DIAGNOSIS — M79.643 PAIN OF HAND, UNSPECIFIED LATERALITY: ICD-10-CM

## 2021-02-26 DIAGNOSIS — M15.9 OSTEOARTHRITIS OF MULTIPLE JOINTS, UNSPECIFIED OSTEOARTHRITIS TYPE: ICD-10-CM

## 2021-02-26 PROCEDURE — 3008F PR BODY MASS INDEX (BMI) DOCUMENTED: ICD-10-PCS | Mod: CPTII,S$GLB,, | Performed by: INTERNAL MEDICINE

## 2021-02-26 PROCEDURE — 99999 PR PBB SHADOW E&M-EST. PATIENT-LVL III: ICD-10-PCS | Mod: PBBFAC,,, | Performed by: INTERNAL MEDICINE

## 2021-02-26 PROCEDURE — 1125F AMNT PAIN NOTED PAIN PRSNT: CPT | Mod: S$GLB,,, | Performed by: INTERNAL MEDICINE

## 2021-02-26 PROCEDURE — 3008F BODY MASS INDEX DOCD: CPT | Mod: CPTII,S$GLB,, | Performed by: INTERNAL MEDICINE

## 2021-02-26 PROCEDURE — 1125F PR PAIN SEVERITY QUANTIFIED, PAIN PRESENT: ICD-10-PCS | Mod: S$GLB,,, | Performed by: INTERNAL MEDICINE

## 2021-02-26 PROCEDURE — 96372 THER/PROPH/DIAG INJ SC/IM: CPT | Mod: S$GLB,,, | Performed by: INTERNAL MEDICINE

## 2021-02-26 PROCEDURE — 96372 PR INJECTION,THERAP/PROPH/DIAG2ST, IM OR SUBCUT: ICD-10-PCS | Mod: S$GLB,,, | Performed by: INTERNAL MEDICINE

## 2021-02-26 PROCEDURE — 99215 OFFICE O/P EST HI 40 MIN: CPT | Mod: 25,S$GLB,, | Performed by: INTERNAL MEDICINE

## 2021-02-26 PROCEDURE — 99215 PR OFFICE/OUTPT VISIT, EST, LEVL V, 40-54 MIN: ICD-10-PCS | Mod: 25,S$GLB,, | Performed by: INTERNAL MEDICINE

## 2021-02-26 PROCEDURE — 99999 PR PBB SHADOW E&M-EST. PATIENT-LVL III: CPT | Mod: PBBFAC,,, | Performed by: INTERNAL MEDICINE

## 2021-02-26 RX ORDER — HYDROCODONE BITARTRATE AND ACETAMINOPHEN 10; 325 MG/1; MG/1
1 TABLET ORAL EVERY 8 HOURS PRN
Qty: 90 TABLET | Refills: 0 | Status: SHIPPED | OUTPATIENT
Start: 2021-04-19 | End: 2021-02-26 | Stop reason: SDUPTHER

## 2021-02-26 RX ORDER — METHYLPREDNISOLONE ACETATE 80 MG/ML
80 INJECTION, SUSPENSION INTRA-ARTICULAR; INTRALESIONAL; INTRAMUSCULAR; SOFT TISSUE
Status: COMPLETED | OUTPATIENT
Start: 2021-02-26 | End: 2021-02-26

## 2021-02-26 RX ORDER — SULFASALAZINE 500 MG/1
1000 TABLET ORAL 2 TIMES DAILY
COMMUNITY
Start: 2021-02-17 | End: 2021-02-26

## 2021-02-26 RX ORDER — KETOROLAC TROMETHAMINE 30 MG/ML
60 INJECTION, SOLUTION INTRAMUSCULAR; INTRAVENOUS
Status: COMPLETED | OUTPATIENT
Start: 2021-02-26 | End: 2021-02-26

## 2021-02-26 RX ORDER — HYDROCODONE BITARTRATE AND ACETAMINOPHEN 10; 325 MG/1; MG/1
1 TABLET ORAL EVERY 8 HOURS PRN
Qty: 90 TABLET | Refills: 0 | Status: SHIPPED | OUTPATIENT
Start: 2021-03-19 | End: 2021-02-26 | Stop reason: SDUPTHER

## 2021-02-26 RX ORDER — HYDROCODONE BITARTRATE AND ACETAMINOPHEN 10; 325 MG/1; MG/1
1 TABLET ORAL EVERY 8 HOURS PRN
Qty: 90 TABLET | Refills: 0 | Status: SHIPPED | OUTPATIENT
Start: 2021-05-19 | End: 2021-06-18 | Stop reason: SDUPTHER

## 2021-02-26 RX ORDER — DEXAMETHASONE SODIUM PHOSPHATE 4 MG/ML
4 INJECTION, SOLUTION INTRA-ARTICULAR; INTRALESIONAL; INTRAMUSCULAR; INTRAVENOUS; SOFT TISSUE
Status: COMPLETED | OUTPATIENT
Start: 2021-02-26 | End: 2021-02-26

## 2021-02-26 RX ORDER — MELOXICAM 15 MG/1
TABLET ORAL
COMMUNITY
End: 2021-02-26 | Stop reason: SDUPTHER

## 2021-02-26 RX ORDER — CYANOCOBALAMIN 1000 UG/ML
1000 INJECTION, SOLUTION INTRAMUSCULAR; SUBCUTANEOUS
Status: COMPLETED | OUTPATIENT
Start: 2021-02-26 | End: 2021-02-26

## 2021-02-26 RX ORDER — TIZANIDINE 4 MG/1
TABLET ORAL
COMMUNITY
End: 2021-06-18

## 2021-02-26 RX ORDER — DICLOFENAC SODIUM 10 MG/G
GEL TOPICAL
COMMUNITY
Start: 2020-05-01

## 2021-02-26 RX ORDER — CICLOPIROX 80 MG/ML
SOLUTION TOPICAL
COMMUNITY

## 2021-02-26 RX ORDER — MELOXICAM 15 MG/1
TABLET ORAL
Qty: 90 TABLET | Refills: 3 | Status: SHIPPED | OUTPATIENT
Start: 2021-02-26 | End: 2022-07-29

## 2021-02-26 RX ADMIN — KETOROLAC TROMETHAMINE 60 MG: 30 INJECTION, SOLUTION INTRAMUSCULAR; INTRAVENOUS at 02:02

## 2021-02-26 RX ADMIN — DEXAMETHASONE SODIUM PHOSPHATE 4 MG: 4 INJECTION, SOLUTION INTRA-ARTICULAR; INTRALESIONAL; INTRAMUSCULAR; INTRAVENOUS; SOFT TISSUE at 02:02

## 2021-02-26 RX ADMIN — CYANOCOBALAMIN 1000 MCG: 1000 INJECTION, SOLUTION INTRAMUSCULAR; SUBCUTANEOUS at 02:02

## 2021-02-26 RX ADMIN — METHYLPREDNISOLONE ACETATE 80 MG: 80 INJECTION, SUSPENSION INTRA-ARTICULAR; INTRALESIONAL; INTRAMUSCULAR; SOFT TISSUE at 02:02

## 2021-03-06 ENCOUNTER — PATIENT MESSAGE (OUTPATIENT)
Dept: RHEUMATOLOGY | Facility: CLINIC | Age: 56
End: 2021-03-06

## 2021-03-06 DIAGNOSIS — M06.00 SERONEGATIVE RHEUMATOID ARTHRITIS: Primary | ICD-10-CM

## 2021-03-08 RX ORDER — PREDNISONE 2 MG/1
2 TABLET, DELAYED RELEASE ORAL NIGHTLY PRN
Qty: 30 TABLET | Refills: 3 | Status: SHIPPED | OUTPATIENT
Start: 2021-03-08 | End: 2021-10-18 | Stop reason: SDUPTHER

## 2021-06-07 ENCOUNTER — PATIENT MESSAGE (OUTPATIENT)
Dept: RHEUMATOLOGY | Facility: CLINIC | Age: 56
End: 2021-06-07

## 2021-06-08 DIAGNOSIS — M06.00 SERONEGATIVE RHEUMATOID ARTHRITIS: ICD-10-CM

## 2021-06-11 ENCOUNTER — LAB VISIT (OUTPATIENT)
Dept: LAB | Facility: HOSPITAL | Age: 56
End: 2021-06-11
Attending: INTERNAL MEDICINE
Payer: COMMERCIAL

## 2021-06-11 DIAGNOSIS — M06.00 SERONEGATIVE RHEUMATOID ARTHRITIS: ICD-10-CM

## 2021-06-11 DIAGNOSIS — M15.9 OSTEOARTHRITIS OF MULTIPLE JOINTS, UNSPECIFIED OSTEOARTHRITIS TYPE: ICD-10-CM

## 2021-06-11 DIAGNOSIS — G89.4 CHRONIC PAIN SYNDROME: ICD-10-CM

## 2021-06-11 DIAGNOSIS — F40.231 SEVERE NEEDLE PHOBIA: ICD-10-CM

## 2021-06-11 DIAGNOSIS — M79.643 PAIN OF HAND, UNSPECIFIED LATERALITY: ICD-10-CM

## 2021-06-11 LAB
ALBUMIN SERPL BCP-MCNC: 3.8 G/DL (ref 3.5–5.2)
ALP SERPL-CCNC: 51 U/L (ref 55–135)
ALT SERPL W/O P-5'-P-CCNC: 16 U/L (ref 10–44)
ANION GAP SERPL CALC-SCNC: 8 MMOL/L (ref 8–16)
AST SERPL-CCNC: 16 U/L (ref 10–40)
BASOPHILS # BLD AUTO: 0.01 K/UL (ref 0–0.2)
BASOPHILS NFR BLD: 0.2 % (ref 0–1.9)
BILIRUB SERPL-MCNC: 0.6 MG/DL (ref 0.1–1)
BUN SERPL-MCNC: 14 MG/DL (ref 6–20)
CALCIUM SERPL-MCNC: 9.5 MG/DL (ref 8.7–10.5)
CHLORIDE SERPL-SCNC: 103 MMOL/L (ref 95–110)
CHOLEST SERPL-MCNC: 205 MG/DL (ref 120–199)
CHOLEST/HDLC SERPL: 3.4 {RATIO} (ref 2–5)
CO2 SERPL-SCNC: 29 MMOL/L (ref 23–29)
CREAT SERPL-MCNC: 0.8 MG/DL (ref 0.5–1.4)
CRP SERPL-MCNC: 4.9 MG/L (ref 0–8.2)
DIFFERENTIAL METHOD: ABNORMAL
EOSINOPHIL # BLD AUTO: 0.1 K/UL (ref 0–0.5)
EOSINOPHIL NFR BLD: 1.6 % (ref 0–8)
ERYTHROCYTE [DISTWIDTH] IN BLOOD BY AUTOMATED COUNT: 13.6 % (ref 11.5–14.5)
ERYTHROCYTE [SEDIMENTATION RATE] IN BLOOD BY WESTERGREN METHOD: 7 MM/HR (ref 0–20)
EST. GFR  (AFRICAN AMERICAN): >60 ML/MIN/1.73 M^2
EST. GFR  (NON AFRICAN AMERICAN): >60 ML/MIN/1.73 M^2
GLUCOSE SERPL-MCNC: 99 MG/DL (ref 70–110)
HCT VFR BLD AUTO: 37.3 % (ref 37–48.5)
HDLC SERPL-MCNC: 60 MG/DL (ref 40–75)
HDLC SERPL: 29.3 % (ref 20–50)
HGB BLD-MCNC: 11.9 G/DL (ref 12–16)
IMM GRANULOCYTES # BLD AUTO: 0 K/UL (ref 0–0.04)
IMM GRANULOCYTES NFR BLD AUTO: 0 % (ref 0–0.5)
LDLC SERPL CALC-MCNC: 127.4 MG/DL (ref 63–159)
LYMPHOCYTES # BLD AUTO: 1.9 K/UL (ref 1–4.8)
LYMPHOCYTES NFR BLD: 44.5 % (ref 18–48)
MCH RBC QN AUTO: 28.1 PG (ref 27–31)
MCHC RBC AUTO-ENTMCNC: 31.9 G/DL (ref 32–36)
MCV RBC AUTO: 88 FL (ref 82–98)
MONOCYTES # BLD AUTO: 0.4 K/UL (ref 0.3–1)
MONOCYTES NFR BLD: 8.7 % (ref 4–15)
NEUTROPHILS # BLD AUTO: 1.9 K/UL (ref 1.8–7.7)
NEUTROPHILS NFR BLD: 45 % (ref 38–73)
NONHDLC SERPL-MCNC: 145 MG/DL
NRBC BLD-RTO: 0 /100 WBC
PLATELET # BLD AUTO: 320 K/UL (ref 150–450)
PMV BLD AUTO: 10.8 FL (ref 9.2–12.9)
POTASSIUM SERPL-SCNC: 3.3 MMOL/L (ref 3.5–5.1)
PROT SERPL-MCNC: 6.7 G/DL (ref 6–8.4)
RBC # BLD AUTO: 4.24 M/UL (ref 4–5.4)
SODIUM SERPL-SCNC: 140 MMOL/L (ref 136–145)
TRIGL SERPL-MCNC: 88 MG/DL (ref 30–150)
WBC # BLD AUTO: 4.27 K/UL (ref 3.9–12.7)

## 2021-06-11 PROCEDURE — 36415 COLL VENOUS BLD VENIPUNCTURE: CPT | Mod: PO | Performed by: INTERNAL MEDICINE

## 2021-06-11 PROCEDURE — 86140 C-REACTIVE PROTEIN: CPT | Performed by: INTERNAL MEDICINE

## 2021-06-11 PROCEDURE — 85025 COMPLETE CBC W/AUTO DIFF WBC: CPT | Performed by: INTERNAL MEDICINE

## 2021-06-11 PROCEDURE — 80053 COMPREHEN METABOLIC PANEL: CPT | Performed by: INTERNAL MEDICINE

## 2021-06-11 PROCEDURE — 85651 RBC SED RATE NONAUTOMATED: CPT | Mod: PO | Performed by: INTERNAL MEDICINE

## 2021-06-11 PROCEDURE — 80061 LIPID PANEL: CPT | Performed by: INTERNAL MEDICINE

## 2021-06-14 ENCOUNTER — PATIENT MESSAGE (OUTPATIENT)
Dept: RHEUMATOLOGY | Facility: CLINIC | Age: 56
End: 2021-06-14

## 2021-06-18 ENCOUNTER — LAB VISIT (OUTPATIENT)
Dept: LAB | Facility: HOSPITAL | Age: 56
End: 2021-06-18
Attending: PHYSICIAN ASSISTANT
Payer: COMMERCIAL

## 2021-06-18 ENCOUNTER — OFFICE VISIT (OUTPATIENT)
Dept: RHEUMATOLOGY | Facility: CLINIC | Age: 56
End: 2021-06-18
Payer: COMMERCIAL

## 2021-06-18 VITALS
WEIGHT: 192.88 LBS | HEART RATE: 56 BPM | SYSTOLIC BLOOD PRESSURE: 106 MMHG | BODY MASS INDEX: 32.93 KG/M2 | HEIGHT: 64 IN | DIASTOLIC BLOOD PRESSURE: 72 MMHG

## 2021-06-18 DIAGNOSIS — M06.00 SERONEGATIVE RHEUMATOID ARTHRITIS: ICD-10-CM

## 2021-06-18 DIAGNOSIS — G89.4 CHRONIC PAIN SYNDROME: ICD-10-CM

## 2021-06-18 DIAGNOSIS — M06.00 SERONEGATIVE RHEUMATOID ARTHRITIS: Primary | ICD-10-CM

## 2021-06-18 DIAGNOSIS — R30.0 DYSURIA: ICD-10-CM

## 2021-06-18 DIAGNOSIS — Z79.899 IMMUNOCOMPROMISED STATE DUE TO DRUG THERAPY: ICD-10-CM

## 2021-06-18 DIAGNOSIS — D84.821 IMMUNOCOMPROMISED STATE DUE TO DRUG THERAPY: ICD-10-CM

## 2021-06-18 DIAGNOSIS — M15.9 OSTEOARTHRITIS OF MULTIPLE JOINTS, UNSPECIFIED OSTEOARTHRITIS TYPE: ICD-10-CM

## 2021-06-18 DIAGNOSIS — E87.6 HYPOKALEMIA: ICD-10-CM

## 2021-06-18 DIAGNOSIS — F40.231 SEVERE NEEDLE PHOBIA: ICD-10-CM

## 2021-06-18 DIAGNOSIS — M79.643 PAIN OF HAND, UNSPECIFIED LATERALITY: ICD-10-CM

## 2021-06-18 LAB
BACTERIA #/AREA URNS HPF: ABNORMAL /HPF
BILIRUB UR QL STRIP: NEGATIVE
CLARITY UR: CLEAR
COLOR UR: YELLOW
GLUCOSE UR QL STRIP: NEGATIVE
HGB UR QL STRIP: NEGATIVE
KETONES UR QL STRIP: NEGATIVE
LEUKOCYTE ESTERASE UR QL STRIP: NEGATIVE
MICROSCOPIC COMMENT: ABNORMAL
NITRITE UR QL STRIP: POSITIVE
PH UR STRIP: 6 [PH] (ref 5–8)
PROT UR QL STRIP: NEGATIVE
RBC #/AREA URNS HPF: 2 /HPF (ref 0–4)
SP GR UR STRIP: 1.02 (ref 1–1.03)
URN SPEC COLLECT METH UR: ABNORMAL
WBC #/AREA URNS HPF: 40 /HPF (ref 0–5)

## 2021-06-18 PROCEDURE — 99214 PR OFFICE/OUTPT VISIT, EST, LEVL IV, 30-39 MIN: ICD-10-PCS | Mod: 25,S$GLB,, | Performed by: PHYSICIAN ASSISTANT

## 2021-06-18 PROCEDURE — 96372 PR INJECTION,THERAP/PROPH/DIAG2ST, IM OR SUBCUT: ICD-10-PCS | Mod: S$GLB,,, | Performed by: PHYSICIAN ASSISTANT

## 2021-06-18 PROCEDURE — 87077 CULTURE AEROBIC IDENTIFY: CPT | Performed by: PHYSICIAN ASSISTANT

## 2021-06-18 PROCEDURE — 1125F AMNT PAIN NOTED PAIN PRSNT: CPT | Mod: S$GLB,,, | Performed by: PHYSICIAN ASSISTANT

## 2021-06-18 PROCEDURE — 99214 OFFICE O/P EST MOD 30 MIN: CPT | Mod: 25,S$GLB,, | Performed by: PHYSICIAN ASSISTANT

## 2021-06-18 PROCEDURE — 81000 URINALYSIS NONAUTO W/SCOPE: CPT | Mod: PO | Performed by: PHYSICIAN ASSISTANT

## 2021-06-18 PROCEDURE — 99999 PR PBB SHADOW E&M-EST. PATIENT-LVL IV: ICD-10-PCS | Mod: PBBFAC,,, | Performed by: PHYSICIAN ASSISTANT

## 2021-06-18 PROCEDURE — 3008F BODY MASS INDEX DOCD: CPT | Mod: CPTII,S$GLB,, | Performed by: PHYSICIAN ASSISTANT

## 2021-06-18 PROCEDURE — 87186 SC STD MICRODIL/AGAR DIL: CPT | Performed by: PHYSICIAN ASSISTANT

## 2021-06-18 PROCEDURE — 3008F PR BODY MASS INDEX (BMI) DOCUMENTED: ICD-10-PCS | Mod: CPTII,S$GLB,, | Performed by: PHYSICIAN ASSISTANT

## 2021-06-18 PROCEDURE — 96372 THER/PROPH/DIAG INJ SC/IM: CPT | Mod: S$GLB,,, | Performed by: PHYSICIAN ASSISTANT

## 2021-06-18 PROCEDURE — 1125F PR PAIN SEVERITY QUANTIFIED, PAIN PRESENT: ICD-10-PCS | Mod: S$GLB,,, | Performed by: PHYSICIAN ASSISTANT

## 2021-06-18 PROCEDURE — 87088 URINE BACTERIA CULTURE: CPT | Performed by: PHYSICIAN ASSISTANT

## 2021-06-18 PROCEDURE — 99999 PR PBB SHADOW E&M-EST. PATIENT-LVL IV: CPT | Mod: PBBFAC,,, | Performed by: PHYSICIAN ASSISTANT

## 2021-06-18 PROCEDURE — 87086 URINE CULTURE/COLONY COUNT: CPT | Performed by: PHYSICIAN ASSISTANT

## 2021-06-18 RX ORDER — METHYLPREDNISOLONE ACETATE 80 MG/ML
80 INJECTION, SUSPENSION INTRA-ARTICULAR; INTRALESIONAL; INTRAMUSCULAR; SOFT TISSUE
Status: COMPLETED | OUTPATIENT
Start: 2021-06-18 | End: 2021-06-18

## 2021-06-18 RX ORDER — POTASSIUM CHLORIDE 1500 MG/1
20 TABLET, EXTENDED RELEASE ORAL DAILY PRN
Qty: 30 TABLET | Refills: 3 | Status: SHIPPED | OUTPATIENT
Start: 2021-06-18 | End: 2021-10-12 | Stop reason: SDUPTHER

## 2021-06-18 RX ORDER — DEXAMETHASONE SODIUM PHOSPHATE 4 MG/ML
4 INJECTION, SOLUTION INTRA-ARTICULAR; INTRALESIONAL; INTRAMUSCULAR; INTRAVENOUS; SOFT TISSUE
Status: COMPLETED | OUTPATIENT
Start: 2021-06-18 | End: 2021-06-18

## 2021-06-18 RX ORDER — KETOROLAC TROMETHAMINE 30 MG/ML
60 INJECTION, SOLUTION INTRAMUSCULAR; INTRAVENOUS
Status: COMPLETED | OUTPATIENT
Start: 2021-06-18 | End: 2021-06-18

## 2021-06-18 RX ORDER — CLONAZEPAM 1 MG/1
1 TABLET ORAL NIGHTLY PRN
COMMUNITY
Start: 2021-06-17 | End: 2022-07-29

## 2021-06-18 RX ORDER — SULFASALAZINE 500 MG/1
1000 TABLET, DELAYED RELEASE ORAL 2 TIMES DAILY
Qty: 120 TABLET | Refills: 3 | Status: SHIPPED | OUTPATIENT
Start: 2021-06-18 | End: 2021-10-18

## 2021-06-18 RX ORDER — METHOCARBAMOL 500 MG/1
500 TABLET, FILM COATED ORAL 3 TIMES DAILY PRN
Qty: 90 TABLET | Refills: 5 | Status: SHIPPED | OUTPATIENT
Start: 2021-06-18 | End: 2022-11-02 | Stop reason: SDUPTHER

## 2021-06-18 RX ORDER — NITROFURANTOIN 25; 75 MG/1; MG/1
100 CAPSULE ORAL 2 TIMES DAILY
Qty: 14 CAPSULE | Refills: 0 | Status: SHIPPED | OUTPATIENT
Start: 2021-06-18 | End: 2021-06-25

## 2021-06-18 RX ORDER — CYANOCOBALAMIN 1000 UG/ML
1000 INJECTION, SOLUTION INTRAMUSCULAR; SUBCUTANEOUS
Status: COMPLETED | OUTPATIENT
Start: 2021-06-18 | End: 2021-06-18

## 2021-06-18 RX ORDER — SULFASALAZINE 500 MG/1
1000 TABLET, DELAYED RELEASE ORAL 2 TIMES DAILY
Qty: 120 TABLET | Refills: 3 | Status: SHIPPED | OUTPATIENT
Start: 2021-06-18 | End: 2021-06-18 | Stop reason: SDUPTHER

## 2021-06-18 RX ADMIN — KETOROLAC TROMETHAMINE 60 MG: 30 INJECTION, SOLUTION INTRAMUSCULAR; INTRAVENOUS at 02:06

## 2021-06-18 RX ADMIN — DEXAMETHASONE SODIUM PHOSPHATE 4 MG: 4 INJECTION, SOLUTION INTRA-ARTICULAR; INTRALESIONAL; INTRAMUSCULAR; INTRAVENOUS; SOFT TISSUE at 02:06

## 2021-06-18 RX ADMIN — CYANOCOBALAMIN 1000 MCG: 1000 INJECTION, SOLUTION INTRAMUSCULAR; SUBCUTANEOUS at 02:06

## 2021-06-18 RX ADMIN — METHYLPREDNISOLONE ACETATE 80 MG: 80 INJECTION, SUSPENSION INTRA-ARTICULAR; INTRALESIONAL; INTRAMUSCULAR; SOFT TISSUE at 02:06

## 2021-06-19 RX ORDER — HYDROCODONE BITARTRATE AND ACETAMINOPHEN 10; 325 MG/1; MG/1
1 TABLET ORAL EVERY 8 HOURS PRN
Qty: 90 TABLET | Refills: 0 | Status: SHIPPED | OUTPATIENT
Start: 2021-06-19 | End: 2021-07-16 | Stop reason: SDUPTHER

## 2021-06-22 LAB — BACTERIA UR CULT: ABNORMAL

## 2021-07-27 ENCOUNTER — HOSPITAL ENCOUNTER (OUTPATIENT)
Dept: PREADMISSION TESTING | Facility: HOSPITAL | Age: 56
Discharge: HOME | End: 2021-07-27
Attending: INTERNAL MEDICINE
Payer: COMMERCIAL

## 2021-07-27 DIAGNOSIS — Z01.818 PRE-OP TESTING: ICD-10-CM

## 2021-07-27 DIAGNOSIS — M06.00 SERONEGATIVE RHEUMATOID ARTHRITIS: Primary | ICD-10-CM

## 2021-07-27 LAB
BASOPHILS # BLD AUTO: 0.02 K/UL (ref 0–0.2)
BASOPHILS NFR BLD: 0.4 % (ref 0–1.9)
DIFFERENTIAL METHOD: ABNORMAL
EOSINOPHIL # BLD AUTO: 0.1 K/UL (ref 0–0.5)
EOSINOPHIL NFR BLD: 2.2 % (ref 0–8)
ERYTHROCYTE [DISTWIDTH] IN BLOOD BY AUTOMATED COUNT: 13.2 % (ref 11.5–14.5)
HCT VFR BLD AUTO: 39.8 % (ref 37–48.5)
HGB BLD-MCNC: 12.4 G/DL (ref 12–16)
IMM GRANULOCYTES # BLD AUTO: 0.01 K/UL (ref 0–0.04)
IMM GRANULOCYTES NFR BLD AUTO: 0.2 % (ref 0–0.5)
LYMPHOCYTES # BLD AUTO: 2.4 K/UL (ref 1–4.8)
LYMPHOCYTES NFR BLD: 46.6 % (ref 18–48)
MCH RBC QN AUTO: 27.8 PG (ref 27–31)
MCHC RBC AUTO-ENTMCNC: 31.2 G/DL (ref 32–36)
MCV RBC AUTO: 89 FL (ref 82–98)
MONOCYTES # BLD AUTO: 0.5 K/UL (ref 0.3–1)
MONOCYTES NFR BLD: 9.2 % (ref 4–15)
NEUTROPHILS # BLD AUTO: 2.1 K/UL (ref 1.8–7.7)
NEUTROPHILS NFR BLD: 41.4 % (ref 38–73)
NRBC BLD-RTO: 0 /100 WBC
PLATELET # BLD AUTO: 326 K/UL (ref 150–450)
PMV BLD AUTO: 10.6 FL (ref 9.2–12.9)
RBC # BLD AUTO: 4.46 M/UL (ref 4–5.4)
WBC # BLD AUTO: 5.11 K/UL (ref 3.9–12.7)

## 2021-07-27 PROCEDURE — 85025 COMPLETE CBC W/AUTO DIFF WBC: CPT | Performed by: INTERNAL MEDICINE

## 2021-07-27 PROCEDURE — 93010 EKG 12-LEAD: ICD-10-PCS | Mod: ,,, | Performed by: INTERNAL MEDICINE

## 2021-07-27 PROCEDURE — 93010 ELECTROCARDIOGRAM REPORT: CPT | Mod: ,,, | Performed by: INTERNAL MEDICINE

## 2021-07-27 PROCEDURE — 93005 ELECTROCARDIOGRAM TRACING: CPT | Performed by: INTERNAL MEDICINE

## 2021-07-29 ENCOUNTER — ANESTHESIA EVENT (OUTPATIENT)
Dept: SURGERY | Facility: HOSPITAL | Age: 56
End: 2021-07-29
Payer: COMMERCIAL

## 2021-07-29 ENCOUNTER — ANESTHESIA (OUTPATIENT)
Dept: SURGERY | Facility: HOSPITAL | Age: 56
End: 2021-07-29
Payer: COMMERCIAL

## 2021-07-29 ENCOUNTER — HOSPITAL ENCOUNTER (OUTPATIENT)
Facility: HOSPITAL | Age: 56
Discharge: HOME | End: 2021-07-29
Attending: INTERNAL MEDICINE | Admitting: INTERNAL MEDICINE
Payer: COMMERCIAL

## 2021-07-29 VITALS
SYSTOLIC BLOOD PRESSURE: 116 MMHG | TEMPERATURE: 98 F | HEART RATE: 48 BPM | DIASTOLIC BLOOD PRESSURE: 69 MMHG | RESPIRATION RATE: 14 BRPM | OXYGEN SATURATION: 100 %

## 2021-07-29 DIAGNOSIS — Z86.010 HISTORY OF COLON POLYPS: ICD-10-CM

## 2021-07-29 PROBLEM — Z86.0100 HISTORY OF COLON POLYPS: Status: ACTIVE | Noted: 2021-07-29

## 2021-07-29 PROCEDURE — 37000009 HC ANESTHESIA EA ADD 15 MINS: Performed by: INTERNAL MEDICINE

## 2021-07-29 PROCEDURE — 27201089 HC SNARE, DISP (ANY): Performed by: INTERNAL MEDICINE

## 2021-07-29 PROCEDURE — 27201028 HC NEEDLE, SCLERO: Performed by: INTERNAL MEDICINE

## 2021-07-29 PROCEDURE — 63600175 PHARM REV CODE 636 W HCPCS: Performed by: STUDENT IN AN ORGANIZED HEALTH CARE EDUCATION/TRAINING PROGRAM

## 2021-07-29 PROCEDURE — 27202438 HC MUCOSAL LIFTING AGENT: Performed by: INTERNAL MEDICINE

## 2021-07-29 PROCEDURE — 27200997: Performed by: INTERNAL MEDICINE

## 2021-07-29 PROCEDURE — 25000003 PHARM REV CODE 250: Performed by: INTERNAL MEDICINE

## 2021-07-29 PROCEDURE — 37000008 HC ANESTHESIA 1ST 15 MINUTES: Performed by: INTERNAL MEDICINE

## 2021-07-29 PROCEDURE — 45390 COLONOSCOPY W/RESECTION: CPT | Performed by: INTERNAL MEDICINE

## 2021-07-29 PROCEDURE — 27201114 HC TRAP (ANY): Performed by: INTERNAL MEDICINE

## 2021-07-29 RX ORDER — SODIUM CHLORIDE, SODIUM LACTATE, POTASSIUM CHLORIDE, CALCIUM CHLORIDE 600; 310; 30; 20 MG/100ML; MG/100ML; MG/100ML; MG/100ML
INJECTION, SOLUTION INTRAVENOUS CONTINUOUS PRN
Status: DISCONTINUED | OUTPATIENT
Start: 2021-07-29 | End: 2021-07-29

## 2021-07-29 RX ORDER — PROPOFOL 10 MG/ML
INJECTION, EMULSION INTRAVENOUS
Status: DISCONTINUED | OUTPATIENT
Start: 2021-07-29 | End: 2021-07-29

## 2021-07-29 RX ADMIN — PROPOFOL 50 MG: 10 INJECTION, EMULSION INTRAVENOUS at 08:07

## 2021-07-29 RX ADMIN — PROPOFOL 30 MG: 10 INJECTION, EMULSION INTRAVENOUS at 08:07

## 2021-07-29 RX ADMIN — PROPOFOL 100 MG: 10 INJECTION, EMULSION INTRAVENOUS at 08:07

## 2021-07-29 RX ADMIN — SODIUM CHLORIDE, SODIUM LACTATE, POTASSIUM CHLORIDE, AND CALCIUM CHLORIDE: .6; .31; .03; .02 INJECTION, SOLUTION INTRAVENOUS at 07:07

## 2021-07-29 RX ADMIN — PROPOFOL 20 MG: 10 INJECTION, EMULSION INTRAVENOUS at 08:07

## 2021-07-29 RX ADMIN — SIMETHICONE 20 MG: 20 SUSPENSION/ DROPS ORAL at 08:07

## 2021-10-08 ENCOUNTER — LAB VISIT (OUTPATIENT)
Dept: LAB | Facility: HOSPITAL | Age: 56
End: 2021-10-08
Attending: PHYSICIAN ASSISTANT
Payer: COMMERCIAL

## 2021-10-08 DIAGNOSIS — M06.00 SERONEGATIVE RHEUMATOID ARTHRITIS: ICD-10-CM

## 2021-10-08 LAB
ALBUMIN SERPL BCP-MCNC: 4 G/DL (ref 3.5–5.2)
ALP SERPL-CCNC: 57 U/L (ref 55–135)
ALT SERPL W/O P-5'-P-CCNC: 13 U/L (ref 10–44)
ANION GAP SERPL CALC-SCNC: 13 MMOL/L (ref 8–16)
AST SERPL-CCNC: 18 U/L (ref 10–40)
BASOPHILS # BLD AUTO: 0.03 K/UL (ref 0–0.2)
BASOPHILS NFR BLD: 0.6 % (ref 0–1.9)
BILIRUB SERPL-MCNC: 0.6 MG/DL (ref 0.1–1)
BUN SERPL-MCNC: 8 MG/DL (ref 6–20)
CALCIUM SERPL-MCNC: 10.8 MG/DL (ref 8.7–10.5)
CHLORIDE SERPL-SCNC: 102 MMOL/L (ref 95–110)
CO2 SERPL-SCNC: 28 MMOL/L (ref 23–29)
CREAT SERPL-MCNC: 0.8 MG/DL (ref 0.5–1.4)
CRP SERPL-MCNC: 9.4 MG/L (ref 0–8.2)
DIFFERENTIAL METHOD: ABNORMAL
EOSINOPHIL # BLD AUTO: 0.1 K/UL (ref 0–0.5)
EOSINOPHIL NFR BLD: 1.8 % (ref 0–8)
ERYTHROCYTE [DISTWIDTH] IN BLOOD BY AUTOMATED COUNT: 14.1 % (ref 11.5–14.5)
ERYTHROCYTE [SEDIMENTATION RATE] IN BLOOD BY WESTERGREN METHOD: 15 MM/HR (ref 0–20)
EST. GFR  (AFRICAN AMERICAN): >60 ML/MIN/1.73 M^2
EST. GFR  (NON AFRICAN AMERICAN): >60 ML/MIN/1.73 M^2
GLUCOSE SERPL-MCNC: 89 MG/DL (ref 70–110)
HCT VFR BLD AUTO: 39.9 % (ref 37–48.5)
HGB BLD-MCNC: 12.7 G/DL (ref 12–16)
IMM GRANULOCYTES # BLD AUTO: 0.01 K/UL (ref 0–0.04)
IMM GRANULOCYTES NFR BLD AUTO: 0.2 % (ref 0–0.5)
LYMPHOCYTES # BLD AUTO: 2.7 K/UL (ref 1–4.8)
LYMPHOCYTES NFR BLD: 54.9 % (ref 18–48)
MCH RBC QN AUTO: 27.7 PG (ref 27–31)
MCHC RBC AUTO-ENTMCNC: 31.8 G/DL (ref 32–36)
MCV RBC AUTO: 87 FL (ref 82–98)
MONOCYTES # BLD AUTO: 0.4 K/UL (ref 0.3–1)
MONOCYTES NFR BLD: 7.6 % (ref 4–15)
NEUTROPHILS # BLD AUTO: 1.7 K/UL (ref 1.8–7.7)
NEUTROPHILS NFR BLD: 34.9 % (ref 38–73)
NRBC BLD-RTO: 0 /100 WBC
PLATELET # BLD AUTO: 308 K/UL (ref 150–450)
PMV BLD AUTO: 11 FL (ref 9.2–12.9)
POTASSIUM SERPL-SCNC: 4.7 MMOL/L (ref 3.5–5.1)
PROT SERPL-MCNC: 7.2 G/DL (ref 6–8.4)
RBC # BLD AUTO: 4.58 M/UL (ref 4–5.4)
SODIUM SERPL-SCNC: 143 MMOL/L (ref 136–145)
WBC # BLD AUTO: 4.99 K/UL (ref 3.9–12.7)

## 2021-10-08 PROCEDURE — 36415 COLL VENOUS BLD VENIPUNCTURE: CPT | Mod: PO | Performed by: PHYSICIAN ASSISTANT

## 2021-10-08 PROCEDURE — 85025 COMPLETE CBC W/AUTO DIFF WBC: CPT | Performed by: PHYSICIAN ASSISTANT

## 2021-10-08 PROCEDURE — 80053 COMPREHEN METABOLIC PANEL: CPT | Performed by: PHYSICIAN ASSISTANT

## 2021-10-08 PROCEDURE — 86140 C-REACTIVE PROTEIN: CPT | Performed by: PHYSICIAN ASSISTANT

## 2021-10-08 PROCEDURE — 85651 RBC SED RATE NONAUTOMATED: CPT | Mod: PO | Performed by: PHYSICIAN ASSISTANT

## 2021-10-12 DIAGNOSIS — E87.6 HYPOKALEMIA: ICD-10-CM

## 2021-10-14 RX ORDER — POTASSIUM CHLORIDE 1500 MG/1
20 TABLET, EXTENDED RELEASE ORAL DAILY PRN
Qty: 30 TABLET | Refills: 3 | Status: SHIPPED | OUTPATIENT
Start: 2021-10-14 | End: 2022-10-14

## 2021-10-18 ENCOUNTER — OFFICE VISIT (OUTPATIENT)
Dept: RHEUMATOLOGY | Facility: CLINIC | Age: 56
End: 2021-10-18
Payer: COMMERCIAL

## 2021-10-18 VITALS
DIASTOLIC BLOOD PRESSURE: 86 MMHG | SYSTOLIC BLOOD PRESSURE: 132 MMHG | BODY MASS INDEX: 31.62 KG/M2 | WEIGHT: 185.19 LBS | HEIGHT: 64 IN | HEART RATE: 65 BPM

## 2021-10-18 DIAGNOSIS — M15.9 OSTEOARTHRITIS OF MULTIPLE JOINTS, UNSPECIFIED OSTEOARTHRITIS TYPE: ICD-10-CM

## 2021-10-18 DIAGNOSIS — D84.821 IMMUNOCOMPROMISED STATE DUE TO DRUG THERAPY: ICD-10-CM

## 2021-10-18 DIAGNOSIS — F40.231 SEVERE NEEDLE PHOBIA: ICD-10-CM

## 2021-10-18 DIAGNOSIS — G89.4 CHRONIC PAIN SYNDROME: ICD-10-CM

## 2021-10-18 DIAGNOSIS — M06.00 SERONEGATIVE RHEUMATOID ARTHRITIS: Primary | ICD-10-CM

## 2021-10-18 DIAGNOSIS — Z79.899 IMMUNOCOMPROMISED STATE DUE TO DRUG THERAPY: ICD-10-CM

## 2021-10-18 DIAGNOSIS — M54.2 NECK PAIN, ACUTE: ICD-10-CM

## 2021-10-18 PROCEDURE — 3075F PR MOST RECENT SYSTOLIC BLOOD PRESS GE 130-139MM HG: ICD-10-PCS | Mod: CPTII,S$GLB,, | Performed by: INTERNAL MEDICINE

## 2021-10-18 PROCEDURE — 1159F MED LIST DOCD IN RCRD: CPT | Mod: CPTII,S$GLB,, | Performed by: INTERNAL MEDICINE

## 2021-10-18 PROCEDURE — 99215 OFFICE O/P EST HI 40 MIN: CPT | Mod: 25,S$GLB,, | Performed by: INTERNAL MEDICINE

## 2021-10-18 PROCEDURE — 3079F PR MOST RECENT DIASTOLIC BLOOD PRESSURE 80-89 MM HG: ICD-10-PCS | Mod: CPTII,S$GLB,, | Performed by: INTERNAL MEDICINE

## 2021-10-18 PROCEDURE — 96372 PR INJECTION,THERAP/PROPH/DIAG2ST, IM OR SUBCUT: ICD-10-PCS | Mod: S$GLB,,, | Performed by: INTERNAL MEDICINE

## 2021-10-18 PROCEDURE — 1159F PR MEDICATION LIST DOCUMENTED IN MEDICAL RECORD: ICD-10-PCS | Mod: CPTII,S$GLB,, | Performed by: INTERNAL MEDICINE

## 2021-10-18 PROCEDURE — 99999 PR PBB SHADOW E&M-EST. PATIENT-LVL III: ICD-10-PCS | Mod: PBBFAC,,, | Performed by: INTERNAL MEDICINE

## 2021-10-18 PROCEDURE — 3008F BODY MASS INDEX DOCD: CPT | Mod: CPTII,S$GLB,, | Performed by: INTERNAL MEDICINE

## 2021-10-18 PROCEDURE — 96372 THER/PROPH/DIAG INJ SC/IM: CPT | Mod: S$GLB,,, | Performed by: INTERNAL MEDICINE

## 2021-10-18 PROCEDURE — 3079F DIAST BP 80-89 MM HG: CPT | Mod: CPTII,S$GLB,, | Performed by: INTERNAL MEDICINE

## 2021-10-18 PROCEDURE — 99999 PR PBB SHADOW E&M-EST. PATIENT-LVL III: CPT | Mod: PBBFAC,,, | Performed by: INTERNAL MEDICINE

## 2021-10-18 PROCEDURE — 3075F SYST BP GE 130 - 139MM HG: CPT | Mod: CPTII,S$GLB,, | Performed by: INTERNAL MEDICINE

## 2021-10-18 PROCEDURE — 3008F PR BODY MASS INDEX (BMI) DOCUMENTED: ICD-10-PCS | Mod: CPTII,S$GLB,, | Performed by: INTERNAL MEDICINE

## 2021-10-18 PROCEDURE — 99215 PR OFFICE/OUTPT VISIT, EST, LEVL V, 40-54 MIN: ICD-10-PCS | Mod: 25,S$GLB,, | Performed by: INTERNAL MEDICINE

## 2021-10-18 RX ORDER — PLECANATIDE 3 MG/1
1 TABLET ORAL DAILY
COMMUNITY
Start: 2021-09-21 | End: 2022-07-29

## 2021-10-18 RX ORDER — HYDROCODONE BITARTRATE AND ACETAMINOPHEN 10; 325 MG/1; MG/1
1 TABLET ORAL EVERY 8 HOURS PRN
Qty: 90 TABLET | Refills: 0 | Status: SHIPPED | OUTPATIENT
Start: 2021-10-18 | End: 2021-10-18 | Stop reason: SDUPTHER

## 2021-10-18 RX ORDER — PREDNISONE 2 MG/1
4 TABLET, DELAYED RELEASE ORAL NIGHTLY PRN
Qty: 60 TABLET | Refills: 6 | Status: SHIPPED | OUTPATIENT
Start: 2021-10-18 | End: 2022-02-18 | Stop reason: SDUPTHER

## 2021-10-18 RX ORDER — SULFASALAZINE 500 MG/1
1000 TABLET, DELAYED RELEASE ORAL 2 TIMES DAILY
Qty: 120 TABLET | Refills: 6 | Status: SHIPPED | OUTPATIENT
Start: 2021-10-18 | End: 2022-07-29 | Stop reason: SDUPTHER

## 2021-10-18 RX ORDER — DEXAMETHASONE SODIUM PHOSPHATE 4 MG/ML
4 INJECTION, SOLUTION INTRA-ARTICULAR; INTRALESIONAL; INTRAMUSCULAR; INTRAVENOUS; SOFT TISSUE
Status: COMPLETED | OUTPATIENT
Start: 2021-10-18 | End: 2021-10-18

## 2021-10-18 RX ORDER — HYDROCODONE BITARTRATE AND ACETAMINOPHEN 10; 325 MG/1; MG/1
1 TABLET ORAL EVERY 8 HOURS PRN
Qty: 90 TABLET | Refills: 0 | Status: SHIPPED | OUTPATIENT
Start: 2021-12-18 | End: 2022-01-18 | Stop reason: SDUPTHER

## 2021-10-18 RX ORDER — CYCLOSPORINE 0.5 MG/ML
1 EMULSION OPHTHALMIC 2 TIMES DAILY
COMMUNITY
Start: 2021-09-21 | End: 2023-12-01

## 2021-10-18 RX ORDER — KETOROLAC TROMETHAMINE 30 MG/ML
60 INJECTION, SOLUTION INTRAMUSCULAR; INTRAVENOUS
Status: COMPLETED | OUTPATIENT
Start: 2021-10-18 | End: 2021-10-18

## 2021-10-18 RX ORDER — HYDROCODONE BITARTRATE AND ACETAMINOPHEN 10; 325 MG/1; MG/1
1 TABLET ORAL EVERY 8 HOURS PRN
Qty: 90 TABLET | Refills: 0 | Status: SHIPPED | OUTPATIENT
Start: 2021-11-18 | End: 2021-10-18 | Stop reason: SDUPTHER

## 2021-10-18 RX ORDER — METHYLPREDNISOLONE ACETATE 80 MG/ML
80 INJECTION, SUSPENSION INTRA-ARTICULAR; INTRALESIONAL; INTRAMUSCULAR; SOFT TISSUE
Status: COMPLETED | OUTPATIENT
Start: 2021-10-18 | End: 2021-10-18

## 2021-10-18 RX ORDER — TOFACITINIB 5 MG/1
5 TABLET, FILM COATED ORAL 2 TIMES DAILY
Qty: 60 TABLET | Refills: 12 | OUTPATIENT
Start: 2021-10-18 | End: 2021-10-18

## 2021-10-18 RX ORDER — CYANOCOBALAMIN 1000 UG/ML
1000 INJECTION, SOLUTION INTRAMUSCULAR; SUBCUTANEOUS
Status: COMPLETED | OUTPATIENT
Start: 2021-10-18 | End: 2021-10-18

## 2021-10-18 RX ADMIN — KETOROLAC TROMETHAMINE 60 MG: 30 INJECTION, SOLUTION INTRAMUSCULAR; INTRAVENOUS at 05:10

## 2021-10-18 RX ADMIN — DEXAMETHASONE SODIUM PHOSPHATE 4 MG: 4 INJECTION, SOLUTION INTRA-ARTICULAR; INTRALESIONAL; INTRAMUSCULAR; INTRAVENOUS; SOFT TISSUE at 05:10

## 2021-10-18 RX ADMIN — CYANOCOBALAMIN 1000 MCG: 1000 INJECTION, SOLUTION INTRAMUSCULAR; SUBCUTANEOUS at 05:10

## 2021-10-18 RX ADMIN — METHYLPREDNISOLONE ACETATE 80 MG: 80 INJECTION, SUSPENSION INTRA-ARTICULAR; INTRALESIONAL; INTRAMUSCULAR; SOFT TISSUE at 05:10

## 2021-10-18 ASSESSMENT — ROUTINE ASSESSMENT OF PATIENT INDEX DATA (RAPID3)
PATIENT GLOBAL ASSESSMENT SCORE: 8
PAIN SCORE: 8
PSYCHOLOGICAL DISTRESS SCORE: 3.3
FATIGUE SCORE: 2.2
MDHAQ FUNCTION SCORE: 1.6
TOTAL RAPID3 SCORE: 7.11

## 2022-02-11 ENCOUNTER — LAB VISIT (OUTPATIENT)
Dept: LAB | Facility: HOSPITAL | Age: 57
End: 2022-02-11
Attending: INTERNAL MEDICINE
Payer: COMMERCIAL

## 2022-02-11 DIAGNOSIS — M54.2 NECK PAIN, ACUTE: ICD-10-CM

## 2022-02-11 DIAGNOSIS — M06.00 SERONEGATIVE RHEUMATOID ARTHRITIS: ICD-10-CM

## 2022-02-11 DIAGNOSIS — F40.231 SEVERE NEEDLE PHOBIA: ICD-10-CM

## 2022-02-11 DIAGNOSIS — G89.4 CHRONIC PAIN SYNDROME: ICD-10-CM

## 2022-02-11 DIAGNOSIS — Z79.899 IMMUNOCOMPROMISED STATE DUE TO DRUG THERAPY: ICD-10-CM

## 2022-02-11 DIAGNOSIS — D84.821 IMMUNOCOMPROMISED STATE DUE TO DRUG THERAPY: ICD-10-CM

## 2022-02-11 LAB
BASOPHILS # BLD AUTO: 0.03 K/UL (ref 0–0.2)
BASOPHILS NFR BLD: 0.7 % (ref 0–1.9)
DIFFERENTIAL METHOD: ABNORMAL
EOSINOPHIL # BLD AUTO: 0.1 K/UL (ref 0–0.5)
EOSINOPHIL NFR BLD: 1.6 % (ref 0–8)
ERYTHROCYTE [DISTWIDTH] IN BLOOD BY AUTOMATED COUNT: 13.1 % (ref 11.5–14.5)
ERYTHROCYTE [SEDIMENTATION RATE] IN BLOOD BY WESTERGREN METHOD: 26 MM/HR (ref 0–36)
HCT VFR BLD AUTO: 40.1 % (ref 37–48.5)
HGB BLD-MCNC: 12.6 G/DL (ref 12–16)
IMM GRANULOCYTES # BLD AUTO: 0.01 K/UL (ref 0–0.04)
IMM GRANULOCYTES NFR BLD AUTO: 0.2 % (ref 0–0.5)
LYMPHOCYTES # BLD AUTO: 2.1 K/UL (ref 1–4.8)
LYMPHOCYTES NFR BLD: 46.6 % (ref 18–48)
MCH RBC QN AUTO: 27.1 PG (ref 27–31)
MCHC RBC AUTO-ENTMCNC: 31.4 G/DL (ref 32–36)
MCV RBC AUTO: 86 FL (ref 82–98)
MONOCYTES # BLD AUTO: 0.4 K/UL (ref 0.3–1)
MONOCYTES NFR BLD: 8 % (ref 4–15)
NEUTROPHILS # BLD AUTO: 1.9 K/UL (ref 1.8–7.7)
NEUTROPHILS NFR BLD: 42.9 % (ref 38–73)
NRBC BLD-RTO: 0 /100 WBC
PLATELET # BLD AUTO: 378 K/UL (ref 150–450)
PMV BLD AUTO: 10.8 FL (ref 9.2–12.9)
RBC # BLD AUTO: 4.65 M/UL (ref 4–5.4)
WBC # BLD AUTO: 4.4 K/UL (ref 3.9–12.7)

## 2022-02-11 PROCEDURE — 36415 COLL VENOUS BLD VENIPUNCTURE: CPT | Mod: PO | Performed by: INTERNAL MEDICINE

## 2022-02-11 PROCEDURE — 86140 C-REACTIVE PROTEIN: CPT | Performed by: INTERNAL MEDICINE

## 2022-02-11 PROCEDURE — 80053 COMPREHEN METABOLIC PANEL: CPT | Performed by: INTERNAL MEDICINE

## 2022-02-11 PROCEDURE — 85652 RBC SED RATE AUTOMATED: CPT | Performed by: INTERNAL MEDICINE

## 2022-02-11 PROCEDURE — 85025 COMPLETE CBC W/AUTO DIFF WBC: CPT | Performed by: INTERNAL MEDICINE

## 2022-02-12 LAB
ALBUMIN SERPL BCP-MCNC: 3.7 G/DL (ref 3.5–5.2)
ALP SERPL-CCNC: 58 U/L (ref 55–135)
ALT SERPL W/O P-5'-P-CCNC: 14 U/L (ref 10–44)
ANION GAP SERPL CALC-SCNC: 9 MMOL/L (ref 8–16)
AST SERPL-CCNC: 17 U/L (ref 10–40)
BILIRUB SERPL-MCNC: 0.5 MG/DL (ref 0.1–1)
BUN SERPL-MCNC: 9 MG/DL (ref 6–20)
CALCIUM SERPL-MCNC: 10.3 MG/DL (ref 8.7–10.5)
CHLORIDE SERPL-SCNC: 101 MMOL/L (ref 95–110)
CO2 SERPL-SCNC: 30 MMOL/L (ref 23–29)
CREAT SERPL-MCNC: 0.8 MG/DL (ref 0.5–1.4)
CRP SERPL-MCNC: 7.2 MG/L (ref 0–8.2)
EST. GFR  (AFRICAN AMERICAN): >60 ML/MIN/1.73 M^2
EST. GFR  (NON AFRICAN AMERICAN): >60 ML/MIN/1.73 M^2
GLUCOSE SERPL-MCNC: 101 MG/DL (ref 70–110)
POTASSIUM SERPL-SCNC: 4.2 MMOL/L (ref 3.5–5.1)
PROT SERPL-MCNC: 7 G/DL (ref 6–8.4)
SODIUM SERPL-SCNC: 140 MMOL/L (ref 136–145)

## 2022-02-18 ENCOUNTER — OFFICE VISIT (OUTPATIENT)
Dept: RHEUMATOLOGY | Facility: CLINIC | Age: 57
End: 2022-02-18
Payer: COMMERCIAL

## 2022-02-18 VITALS
SYSTOLIC BLOOD PRESSURE: 146 MMHG | HEART RATE: 68 BPM | WEIGHT: 188.94 LBS | BODY MASS INDEX: 32.26 KG/M2 | DIASTOLIC BLOOD PRESSURE: 96 MMHG | HEIGHT: 64 IN

## 2022-02-18 DIAGNOSIS — G89.4 CHRONIC PAIN SYNDROME: ICD-10-CM

## 2022-02-18 DIAGNOSIS — M06.00 SERONEGATIVE RHEUMATOID ARTHRITIS: Primary | ICD-10-CM

## 2022-02-18 DIAGNOSIS — D84.821 IMMUNOCOMPROMISED STATE DUE TO DRUG THERAPY: ICD-10-CM

## 2022-02-18 DIAGNOSIS — M06.00 SERONEGATIVE RHEUMATOID ARTHRITIS: ICD-10-CM

## 2022-02-18 DIAGNOSIS — M15.9 OSTEOARTHRITIS OF MULTIPLE JOINTS, UNSPECIFIED OSTEOARTHRITIS TYPE: ICD-10-CM

## 2022-02-18 DIAGNOSIS — Z79.899 IMMUNOCOMPROMISED STATE DUE TO DRUG THERAPY: ICD-10-CM

## 2022-02-18 PROCEDURE — 96372 PR INJECTION,THERAP/PROPH/DIAG2ST, IM OR SUBCUT: ICD-10-PCS | Mod: S$GLB,,, | Performed by: PHYSICIAN ASSISTANT

## 2022-02-18 PROCEDURE — 3080F DIAST BP >= 90 MM HG: CPT | Mod: CPTII,S$GLB,, | Performed by: PHYSICIAN ASSISTANT

## 2022-02-18 PROCEDURE — 96372 THER/PROPH/DIAG INJ SC/IM: CPT | Mod: S$GLB,,, | Performed by: PHYSICIAN ASSISTANT

## 2022-02-18 PROCEDURE — 1160F RVW MEDS BY RX/DR IN RCRD: CPT | Mod: CPTII,S$GLB,, | Performed by: PHYSICIAN ASSISTANT

## 2022-02-18 PROCEDURE — 99214 OFFICE O/P EST MOD 30 MIN: CPT | Mod: 25,S$GLB,, | Performed by: PHYSICIAN ASSISTANT

## 2022-02-18 PROCEDURE — 1159F PR MEDICATION LIST DOCUMENTED IN MEDICAL RECORD: ICD-10-PCS | Mod: CPTII,S$GLB,, | Performed by: PHYSICIAN ASSISTANT

## 2022-02-18 PROCEDURE — 3008F PR BODY MASS INDEX (BMI) DOCUMENTED: ICD-10-PCS | Mod: CPTII,S$GLB,, | Performed by: PHYSICIAN ASSISTANT

## 2022-02-18 PROCEDURE — 3008F BODY MASS INDEX DOCD: CPT | Mod: CPTII,S$GLB,, | Performed by: PHYSICIAN ASSISTANT

## 2022-02-18 PROCEDURE — 99214 PR OFFICE/OUTPT VISIT, EST, LEVL IV, 30-39 MIN: ICD-10-PCS | Mod: 25,S$GLB,, | Performed by: PHYSICIAN ASSISTANT

## 2022-02-18 PROCEDURE — 1160F PR REVIEW ALL MEDS BY PRESCRIBER/CLIN PHARMACIST DOCUMENTED: ICD-10-PCS | Mod: CPTII,S$GLB,, | Performed by: PHYSICIAN ASSISTANT

## 2022-02-18 PROCEDURE — 3080F PR MOST RECENT DIASTOLIC BLOOD PRESSURE >= 90 MM HG: ICD-10-PCS | Mod: CPTII,S$GLB,, | Performed by: PHYSICIAN ASSISTANT

## 2022-02-18 PROCEDURE — 3077F PR MOST RECENT SYSTOLIC BLOOD PRESSURE >= 140 MM HG: ICD-10-PCS | Mod: CPTII,S$GLB,, | Performed by: PHYSICIAN ASSISTANT

## 2022-02-18 PROCEDURE — 99999 PR PBB SHADOW E&M-EST. PATIENT-LVL V: CPT | Mod: PBBFAC,,, | Performed by: PHYSICIAN ASSISTANT

## 2022-02-18 PROCEDURE — 3077F SYST BP >= 140 MM HG: CPT | Mod: CPTII,S$GLB,, | Performed by: PHYSICIAN ASSISTANT

## 2022-02-18 PROCEDURE — 1159F MED LIST DOCD IN RCRD: CPT | Mod: CPTII,S$GLB,, | Performed by: PHYSICIAN ASSISTANT

## 2022-02-18 PROCEDURE — 99999 PR PBB SHADOW E&M-EST. PATIENT-LVL V: ICD-10-PCS | Mod: PBBFAC,,, | Performed by: PHYSICIAN ASSISTANT

## 2022-02-18 RX ORDER — DEXAMETHASONE SODIUM PHOSPHATE 4 MG/ML
4 INJECTION, SOLUTION INTRA-ARTICULAR; INTRALESIONAL; INTRAMUSCULAR; INTRAVENOUS; SOFT TISSUE
Status: COMPLETED | OUTPATIENT
Start: 2022-02-18 | End: 2022-02-18

## 2022-02-18 RX ORDER — CYANOCOBALAMIN 1000 UG/ML
1000 INJECTION, SOLUTION INTRAMUSCULAR; SUBCUTANEOUS
Status: COMPLETED | OUTPATIENT
Start: 2022-02-18 | End: 2022-02-18

## 2022-02-18 RX ORDER — PREDNISONE 2.5 MG/1
2.5 TABLET ORAL DAILY
Qty: 90 TABLET | Refills: 1 | Status: SHIPPED | OUTPATIENT
Start: 2022-02-18 | End: 2022-08-02 | Stop reason: SDUPTHER

## 2022-02-18 RX ORDER — KETOROLAC TROMETHAMINE 30 MG/ML
60 INJECTION, SOLUTION INTRAMUSCULAR; INTRAVENOUS
Status: COMPLETED | OUTPATIENT
Start: 2022-02-18 | End: 2022-02-18

## 2022-02-18 RX ORDER — PREDNISONE 2 MG/1
4 TABLET, DELAYED RELEASE ORAL NIGHTLY PRN
Qty: 60 TABLET | Refills: 6 | Status: SHIPPED | OUTPATIENT
Start: 2022-02-18 | End: 2023-02-18

## 2022-02-18 RX ORDER — METHYLPREDNISOLONE ACETATE 80 MG/ML
80 INJECTION, SUSPENSION INTRA-ARTICULAR; INTRALESIONAL; INTRAMUSCULAR; SOFT TISSUE
Status: COMPLETED | OUTPATIENT
Start: 2022-02-18 | End: 2022-02-18

## 2022-02-18 RX ADMIN — DEXAMETHASONE SODIUM PHOSPHATE 4 MG: 4 INJECTION, SOLUTION INTRA-ARTICULAR; INTRALESIONAL; INTRAMUSCULAR; INTRAVENOUS; SOFT TISSUE at 02:02

## 2022-02-18 RX ADMIN — CYANOCOBALAMIN 1000 MCG: 1000 INJECTION, SOLUTION INTRAMUSCULAR; SUBCUTANEOUS at 02:02

## 2022-02-18 RX ADMIN — KETOROLAC TROMETHAMINE 60 MG: 30 INJECTION, SOLUTION INTRAMUSCULAR; INTRAVENOUS at 02:02

## 2022-02-18 RX ADMIN — METHYLPREDNISOLONE ACETATE 80 MG: 80 INJECTION, SUSPENSION INTRA-ARTICULAR; INTRALESIONAL; INTRAMUSCULAR; SOFT TISSUE at 02:02

## 2022-02-18 ASSESSMENT — ROUTINE ASSESSMENT OF PATIENT INDEX DATA (RAPID3)
MDHAQ FUNCTION SCORE: 0.7
FATIGUE SCORE: 1.1
PAIN SCORE: 7
PATIENT GLOBAL ASSESSMENT SCORE: 7.5
TOTAL RAPID3 SCORE: 5.61
PSYCHOLOGICAL DISTRESS SCORE: 0

## 2022-02-18 NOTE — PROGRESS NOTES
Subjective:       Patient ID: Susana Andersen is a 56 y.o. female.    Chief Complaint: Disease Management    Mrs. Andersen is a 56 year old female who presents to clinic for follow up on seronegative rheumatoid arthritis. She has been compliant with SSZ 1000 mg bid. Xeljanz was considered at her last visit, but she has not started this. She is taking prednisone 2.5 mg 2-3 days a week as needed for pain. She will occasionally take helder at bedtime if having a severe flare. She is taking robaxin and mobic only PRN. She continues to have pain and swelling in her hands R>L and wrist. She has intermittent neck pain, but no radicular sx or weakness. No serious infections since his last visit. She forgot to take her BP medication today and she states readings are typically well controlled.     Current tx:  1. SSZ  2. Norco  3. Gabapentin  4. Mobic    Review of Systems   Constitutional: Positive for activity change. Negative for appetite change, chills, fatigue and fever.   Eyes: Negative for visual disturbance.   Respiratory: Negative for cough and shortness of breath.    Cardiovascular: Negative for chest pain, palpitations and leg swelling.   Gastrointestinal: Negative for abdominal pain, constipation, diarrhea, nausea and vomiting.   Genitourinary: Negative for dysuria.   Musculoskeletal: Positive for arthralgias, joint swelling and myalgias.   Allergic/Immunologic: Negative for immunocompromised state.   Neurological: Negative for dizziness, weakness, light-headedness and headaches.         Objective:     Vitals:    02/18/22 1307   BP: (!) 146/96   Pulse: 68       Past Medical History:   Diagnosis Date    Allergy     Arthritis     Colon polyps 07/29/2021    Hypertension      Past Surgical History:   Procedure Laterality Date    COLONOSCOPY N/A 7/29/2021    Procedure: COLONOSCOPY;  Surgeon: Edgar Bolaños MD;  Location: [facility];  Service: Endoscopy;  Laterality: N/A;    COLONOSCOPY W/ BIOPSIES            Physical  Exam   Eyes: Right conjunctiva is not injected. Left conjunctiva is not injected. Right eye exhibits normal extraocular motion. Left eye exhibits normal extraocular motion.   Neck: No JVD present. No thyromegaly present.   Cardiovascular: Normal rate and regular rhythm. Exam reveals no decreased pulses.   Pulmonary/Chest: She has no wheezes. She has no rhonchi. She has no rales.   Musculoskeletal:      Right shoulder: Normal.      Left shoulder: Normal.      Right elbow: Normal.      Left elbow: Normal.      Right wrist: Swelling and tenderness present.      Left wrist: Swelling and tenderness present.      Right knee: Tenderness present.      Left knee: Tenderness present.   Lymphadenopathy:     She has no cervical adenopathy.   Neurological: Gait normal.   Skin: No rash noted.   Psychiatric: Mood and affect normal.       Right Side Rheumatological Exam     Examination finds the shoulder, elbow, 1st MCP, 4th MCP and 5th MCP normal.    The patient is tender to palpation of the wrist, knee, 1st PIP, 2nd PIP, 2nd MCP, 3rd PIP, 3rd MCP, 4th PIP and 5th PIP    She has swelling of the wrist, 1st PIP, 2nd PIP, 2nd MCP, 3rd PIP, 3rd MCP, 4th PIP and 5th PIP    Left Side Rheumatological Exam     Examination finds the shoulder, elbow, 1st PIP, 1st MCP, 2nd PIP, 2nd MCP, 3rd PIP, 3rd MCP, 4th PIP, 4th MCP, 5th PIP and 5th MCP normal.    The patient is tender to palpation of the wrist and knee.    She has swelling of the wrist            Labs reviewed:  Component      Latest Ref Rng & Units 2/11/2022   WBC      3.90 - 12.70 K/uL 4.40   RBC      4.00 - 5.40 M/uL 4.65   Hemoglobin      12.0 - 16.0 g/dL 12.6   Hematocrit      37.0 - 48.5 % 40.1   MCV      82 - 98 fL 86   MCH      27.0 - 31.0 pg 27.1   MCHC      32.0 - 36.0 g/dL 31.4 (L)   RDW      11.5 - 14.5 % 13.1   Platelets      150 - 450 K/uL 378   MPV      9.2 - 12.9 fL 10.8   Immature Granulocytes      0.0 - 0.5 % 0.2   Gran # (ANC)      1.8 - 7.7 K/uL 1.9   Immature Grans  (Abs)      0.00 - 0.04 K/uL 0.01   Lymph #      1.0 - 4.8 K/uL 2.1   Mono #      0.3 - 1.0 K/uL 0.4   Eos #      0.0 - 0.5 K/uL 0.1   Baso #      0.00 - 0.20 K/uL 0.03   nRBC      0 /100 WBC 0   Gran %      38.0 - 73.0 % 42.9   Lymph %      18.0 - 48.0 % 46.6   Mono %      4.0 - 15.0 % 8.0   Eosinophil %      0.0 - 8.0 % 1.6   Basophil %      0.0 - 1.9 % 0.7   Differential Method       Automated   Sodium      136 - 145 mmol/L 140   Potassium      3.5 - 5.1 mmol/L 4.2   Chloride      95 - 110 mmol/L 101   CO2      23 - 29 mmol/L 30 (H)   Glucose      70 - 110 mg/dL 101   BUN      6 - 20 mg/dL 9   Creatinine      0.5 - 1.4 mg/dL 0.8   Calcium      8.7 - 10.5 mg/dL 10.3   PROTEIN TOTAL      6.0 - 8.4 g/dL 7.0   Albumin      3.5 - 5.2 g/dL 3.7   BILIRUBIN TOTAL      0.1 - 1.0 mg/dL 0.5   Alkaline Phosphatase      55 - 135 U/L 58   AST      10 - 40 U/L 17   ALT      10 - 44 U/L 14   Anion Gap      8 - 16 mmol/L 9   eGFR if African American      >60 mL/min/1.73 m:2 >60.0   eGFR if non African American      >60 mL/min/1.73 m:2 >60.0   Specimen UA       Urine, Clean Catch   Color, UA      Yellow, Straw, Kendra Kendra   Appearance, UA      Clear Clear   pH, UA      5.0 - 8.0 5.0   Specific Gravity, UA      1.005 - 1.030 1.020   Protein, UA      Negative Negative   Glucose, UA      Negative Negative   Ketones, UA      Negative Negative   Bilirubin (UA)      Negative Negative   Occult Blood UA      Negative Negative   NITRITE UA      Negative Negative   Leukocytes, UA      Negative Trace (A)   CRP      0.0 - 8.2 mg/L 7.2   Sed Rate      0 - 36 mm/Hr 26     Assessment:       1. Seronegative rheumatoid arthritis    2. Immunocompromised state due to drug therapy    3. Osteoarthritis of multiple joints, unspecified osteoarthritis type    4. Chronic pain syndrome            Plan:       Seronegative rheumatoid arthritis  -     dexamethasone injection 4 mg  -     ketorolac injection 60 mg  -     methylPREDNISolone acetate injection 80  mg  -     cyanocobalamin injection 1,000 mcg  -     prednisone (BRUNILDA) 2 mg TbEC; Take 4 mg by mouth nightly as needed (arthritis flare).  Dispense: 60 tablet; Refill: 6  -     predniSONE (DELTASONE) 2.5 MG tablet; Take 1 tablet (2.5 mg total) by mouth once daily.  Dispense: 90 tablet; Refill: 1  -     CBC Auto Differential; Future; Expected date: 02/18/2022  -     C-Reactive Protein; Future; Expected date: 02/18/2022  -     Comprehensive Metabolic Panel; Future; Expected date: 02/18/2022  -     Sedimentation rate; Future; Expected date: 02/18/2022    Immunocompromised state due to drug therapy    Osteoarthritis of multiple joints, unspecified osteoarthritis type    Chronic pain syndrome          Assessment:  56 year old female with  Seronegative RA  --osteoarthritis  --chronic pain syndrome    Plan:  1. toradol 60, dexa 4, depo 80, b12 today  2. Cont SSZ 1000 mg bid  3. Cont robaxin 500 mg tid prn, continue mobic prn  4. Cont. norco prn per MD.  I have checked [place] prescription monitoring program site and no unusual or abnormal behavior has occurred pt understand the risk and benefits of taking opioid medications and has decided to continue the medication.  5. Brunilda prn  6. Resume home BP meds. Monitor BP at home and keep a log. Notify clinic if BP is persistently >140/90.      Follow up:  4 mo Dr. Vania harmon/labs prior

## 2022-02-21 RX ORDER — HYDROCODONE BITARTRATE AND ACETAMINOPHEN 10; 325 MG/1; MG/1
1 TABLET ORAL EVERY 8 HOURS PRN
Qty: 90 TABLET | Refills: 0 | Status: SHIPPED | OUTPATIENT
Start: 2022-03-22 | End: 2022-04-21

## 2022-02-21 RX ORDER — HYDROCODONE BITARTRATE AND ACETAMINOPHEN 10; 325 MG/1; MG/1
1 TABLET ORAL EVERY 8 HOURS PRN
Qty: 90 TABLET | Refills: 0 | Status: SHIPPED | OUTPATIENT
Start: 2022-02-21 | End: 2022-03-23

## 2022-02-21 RX ORDER — HYDROCODONE BITARTRATE AND ACETAMINOPHEN 10; 325 MG/1; MG/1
1 TABLET ORAL EVERY 8 HOURS PRN
Qty: 90 TABLET | Refills: 0 | Status: SHIPPED | OUTPATIENT
Start: 2022-04-21 | End: 2022-05-23 | Stop reason: SDUPTHER

## 2022-02-23 DIAGNOSIS — D84.9 IMMUNOSUPPRESSED STATUS: ICD-10-CM

## 2022-02-24 ENCOUNTER — DOCUMENTATION ONLY (OUTPATIENT)
Dept: PHARMACY | Facility: CLINIC | Age: 57
End: 2022-02-24
Payer: COMMERCIAL

## 2022-02-24 NOTE — PROGRESS NOTES
PA FORM RECEIVED FOR BRUNILDA 2 MG. FORM COMPLETED AND FAXED BACK TO [facility]      KAMILLE SULLIVAN CPHT  MED ACCESS  2/24/22

## 2022-03-10 ENCOUNTER — PATIENT MESSAGE (OUTPATIENT)
Dept: RHEUMATOLOGY | Facility: CLINIC | Age: 57
End: 2022-03-10
Payer: COMMERCIAL

## 2022-06-16 ENCOUNTER — LAB VISIT (OUTPATIENT)
Dept: LAB | Facility: CLINIC | Age: 57
End: 2022-06-16
Payer: COMMERCIAL

## 2022-06-16 DIAGNOSIS — M06.00 SERONEGATIVE RHEUMATOID ARTHRITIS: ICD-10-CM

## 2022-06-16 LAB
ALBUMIN SERPL BCP-MCNC: 4.1 G/DL (ref 3.5–5.2)
ALP SERPL-CCNC: 75 U/L (ref 55–135)
ALT SERPL W/O P-5'-P-CCNC: 18 U/L (ref 10–44)
ANION GAP SERPL CALC-SCNC: 9 MMOL/L (ref 8–16)
AST SERPL-CCNC: 18 U/L (ref 10–40)
BASOPHILS # BLD AUTO: 0.03 K/UL (ref 0–0.2)
BASOPHILS NFR BLD: 0.6 % (ref 0–1.9)
BILIRUB SERPL-MCNC: 0.6 MG/DL (ref 0.1–1)
BUN SERPL-MCNC: 14 MG/DL (ref 6–20)
CALCIUM SERPL-MCNC: 10.1 MG/DL (ref 8.7–10.5)
CHLORIDE SERPL-SCNC: 103 MMOL/L (ref 95–110)
CO2 SERPL-SCNC: 32 MMOL/L (ref 23–29)
CREAT SERPL-MCNC: 0.8 MG/DL (ref 0.5–1.4)
CRP SERPL-MCNC: 7 MG/L (ref 0–8.2)
DIFFERENTIAL METHOD: ABNORMAL
EOSINOPHIL # BLD AUTO: 0.1 K/UL (ref 0–0.5)
EOSINOPHIL NFR BLD: 1 % (ref 0–8)
ERYTHROCYTE [DISTWIDTH] IN BLOOD BY AUTOMATED COUNT: 13.4 % (ref 11.5–14.5)
ERYTHROCYTE [SEDIMENTATION RATE] IN BLOOD BY WESTERGREN METHOD: 10 MM/HR (ref 0–20)
EST. GFR  (AFRICAN AMERICAN): >60 ML/MIN/1.73 M^2
EST. GFR  (NON AFRICAN AMERICAN): >60 ML/MIN/1.73 M^2
GLUCOSE SERPL-MCNC: 109 MG/DL (ref 70–110)
HCT VFR BLD AUTO: 42.1 % (ref 37–48.5)
HGB BLD-MCNC: 13.2 G/DL (ref 12–16)
IMM GRANULOCYTES # BLD AUTO: 0.01 K/UL (ref 0–0.04)
IMM GRANULOCYTES NFR BLD AUTO: 0.2 % (ref 0–0.5)
LYMPHOCYTES # BLD AUTO: 1.9 K/UL (ref 1–4.8)
LYMPHOCYTES NFR BLD: 37.2 % (ref 18–48)
MCH RBC QN AUTO: 27.1 PG (ref 27–31)
MCHC RBC AUTO-ENTMCNC: 31.4 G/DL (ref 32–36)
MCV RBC AUTO: 86 FL (ref 82–98)
MONOCYTES # BLD AUTO: 0.3 K/UL (ref 0.3–1)
MONOCYTES NFR BLD: 5.6 % (ref 4–15)
NEUTROPHILS # BLD AUTO: 2.8 K/UL (ref 1.8–7.7)
NEUTROPHILS NFR BLD: 55.4 % (ref 38–73)
NRBC BLD-RTO: 0 /100 WBC
PLATELET # BLD AUTO: 296 K/UL (ref 150–450)
PMV BLD AUTO: 11.7 FL (ref 9.2–12.9)
POTASSIUM SERPL-SCNC: 4.4 MMOL/L (ref 3.5–5.1)
PROT SERPL-MCNC: 7.5 G/DL (ref 6–8.4)
RBC # BLD AUTO: 4.87 M/UL (ref 4–5.4)
SODIUM SERPL-SCNC: 144 MMOL/L (ref 136–145)
WBC # BLD AUTO: 5.03 K/UL (ref 3.9–12.7)

## 2022-06-16 PROCEDURE — 85025 COMPLETE CBC W/AUTO DIFF WBC: CPT | Performed by: PHYSICIAN ASSISTANT

## 2022-06-16 PROCEDURE — 36415 COLL VENOUS BLD VENIPUNCTURE: CPT | Mod: PN,,, | Performed by: PHYSICIAN ASSISTANT

## 2022-06-16 PROCEDURE — 80053 COMPREHEN METABOLIC PANEL: CPT | Performed by: PHYSICIAN ASSISTANT

## 2022-06-16 PROCEDURE — 86140 C-REACTIVE PROTEIN: CPT | Performed by: PHYSICIAN ASSISTANT

## 2022-06-16 PROCEDURE — 85651 RBC SED RATE NONAUTOMATED: CPT | Performed by: PHYSICIAN ASSISTANT

## 2022-06-16 PROCEDURE — 36415 PR COLLECTION VENOUS BLOOD,VENIPUNCTURE: ICD-10-PCS | Mod: PN,,, | Performed by: PHYSICIAN ASSISTANT

## 2022-06-23 ENCOUNTER — PATIENT MESSAGE (OUTPATIENT)
Dept: RHEUMATOLOGY | Facility: CLINIC | Age: 57
End: 2022-06-23
Payer: COMMERCIAL

## 2022-06-23 DIAGNOSIS — M06.00 SERONEGATIVE RHEUMATOID ARTHRITIS: ICD-10-CM

## 2022-06-23 DIAGNOSIS — M15.9 OSTEOARTHRITIS OF MULTIPLE JOINTS, UNSPECIFIED OSTEOARTHRITIS TYPE: ICD-10-CM

## 2022-06-23 DIAGNOSIS — G89.4 CHRONIC PAIN SYNDROME: ICD-10-CM

## 2022-06-27 DIAGNOSIS — G89.4 CHRONIC PAIN SYNDROME: ICD-10-CM

## 2022-06-27 DIAGNOSIS — M06.00 SERONEGATIVE RHEUMATOID ARTHRITIS: ICD-10-CM

## 2022-06-27 DIAGNOSIS — M15.9 OSTEOARTHRITIS OF MULTIPLE JOINTS, UNSPECIFIED OSTEOARTHRITIS TYPE: ICD-10-CM

## 2022-06-27 RX ORDER — HYDROCODONE BITARTRATE AND ACETAMINOPHEN 10; 325 MG/1; MG/1
1 TABLET ORAL EVERY 8 HOURS PRN
Qty: 90 TABLET | Refills: 0 | OUTPATIENT
Start: 2022-06-27 | End: 2022-07-27

## 2022-06-27 RX ORDER — HYDROCODONE BITARTRATE AND ACETAMINOPHEN 10; 325 MG/1; MG/1
1 TABLET ORAL EVERY 8 HOURS PRN
Qty: 90 TABLET | Refills: 0 | Status: SHIPPED | OUTPATIENT
Start: 2022-06-27 | End: 2022-07-27

## 2022-07-29 ENCOUNTER — OFFICE VISIT (OUTPATIENT)
Dept: RHEUMATOLOGY | Facility: CLINIC | Age: 57
End: 2022-07-29
Payer: COMMERCIAL

## 2022-07-29 VITALS
WEIGHT: 189.69 LBS | HEIGHT: 64 IN | BODY MASS INDEX: 32.38 KG/M2 | DIASTOLIC BLOOD PRESSURE: 86 MMHG | HEART RATE: 57 BPM | SYSTOLIC BLOOD PRESSURE: 136 MMHG

## 2022-07-29 DIAGNOSIS — M06.00 SERONEGATIVE RHEUMATOID ARTHRITIS: ICD-10-CM

## 2022-07-29 DIAGNOSIS — G89.4 CHRONIC PAIN SYNDROME: ICD-10-CM

## 2022-07-29 DIAGNOSIS — Z79.899 IMMUNOCOMPROMISED STATE DUE TO DRUG THERAPY: Primary | ICD-10-CM

## 2022-07-29 DIAGNOSIS — M54.2 NECK PAIN, ACUTE: ICD-10-CM

## 2022-07-29 DIAGNOSIS — D84.821 IMMUNOCOMPROMISED STATE DUE TO DRUG THERAPY: Primary | ICD-10-CM

## 2022-07-29 PROCEDURE — 99215 OFFICE O/P EST HI 40 MIN: CPT | Mod: 25,S$GLB,, | Performed by: INTERNAL MEDICINE

## 2022-07-29 PROCEDURE — 96372 THER/PROPH/DIAG INJ SC/IM: CPT | Mod: S$GLB,,, | Performed by: INTERNAL MEDICINE

## 2022-07-29 PROCEDURE — 3075F SYST BP GE 130 - 139MM HG: CPT | Mod: CPTII,S$GLB,, | Performed by: INTERNAL MEDICINE

## 2022-07-29 PROCEDURE — 3079F DIAST BP 80-89 MM HG: CPT | Mod: CPTII,S$GLB,, | Performed by: INTERNAL MEDICINE

## 2022-07-29 PROCEDURE — 3008F PR BODY MASS INDEX (BMI) DOCUMENTED: ICD-10-PCS | Mod: CPTII,S$GLB,, | Performed by: INTERNAL MEDICINE

## 2022-07-29 PROCEDURE — 3075F PR MOST RECENT SYSTOLIC BLOOD PRESS GE 130-139MM HG: ICD-10-PCS | Mod: CPTII,S$GLB,, | Performed by: INTERNAL MEDICINE

## 2022-07-29 PROCEDURE — 99999 PR PBB SHADOW E&M-EST. PATIENT-LVL IV: ICD-10-PCS | Mod: PBBFAC,,, | Performed by: INTERNAL MEDICINE

## 2022-07-29 PROCEDURE — 96372 PR INJECTION,THERAP/PROPH/DIAG2ST, IM OR SUBCUT: ICD-10-PCS | Mod: S$GLB,,, | Performed by: INTERNAL MEDICINE

## 2022-07-29 PROCEDURE — 99999 PR PBB SHADOW E&M-EST. PATIENT-LVL IV: CPT | Mod: PBBFAC,,, | Performed by: INTERNAL MEDICINE

## 2022-07-29 PROCEDURE — 1159F MED LIST DOCD IN RCRD: CPT | Mod: CPTII,S$GLB,, | Performed by: INTERNAL MEDICINE

## 2022-07-29 PROCEDURE — 3079F PR MOST RECENT DIASTOLIC BLOOD PRESSURE 80-89 MM HG: ICD-10-PCS | Mod: CPTII,S$GLB,, | Performed by: INTERNAL MEDICINE

## 2022-07-29 PROCEDURE — 1159F PR MEDICATION LIST DOCUMENTED IN MEDICAL RECORD: ICD-10-PCS | Mod: CPTII,S$GLB,, | Performed by: INTERNAL MEDICINE

## 2022-07-29 PROCEDURE — 3008F BODY MASS INDEX DOCD: CPT | Mod: CPTII,S$GLB,, | Performed by: INTERNAL MEDICINE

## 2022-07-29 PROCEDURE — 99215 PR OFFICE/OUTPT VISIT, EST, LEVL V, 40-54 MIN: ICD-10-PCS | Mod: 25,S$GLB,, | Performed by: INTERNAL MEDICINE

## 2022-07-29 RX ORDER — HYDROCODONE BITARTRATE AND ACETAMINOPHEN 10; 325 MG/1; MG/1
1 TABLET ORAL EVERY 8 HOURS PRN
Qty: 90 TABLET | Refills: 0 | Status: SHIPPED | OUTPATIENT
Start: 2022-08-26 | End: 2022-09-25

## 2022-07-29 RX ORDER — GABAPENTIN 400 MG/1
400 CAPSULE ORAL 3 TIMES DAILY
Qty: 90 CAPSULE | Refills: 11 | Status: SHIPPED | OUTPATIENT
Start: 2022-07-29 | End: 2023-03-30

## 2022-07-29 RX ORDER — CYANOCOBALAMIN 1000 UG/ML
1000 INJECTION, SOLUTION INTRAMUSCULAR; SUBCUTANEOUS
Status: COMPLETED | OUTPATIENT
Start: 2022-07-29 | End: 2022-07-29

## 2022-07-29 RX ORDER — METHYLPREDNISOLONE ACETATE 80 MG/ML
160 INJECTION, SUSPENSION INTRA-ARTICULAR; INTRALESIONAL; INTRAMUSCULAR; SOFT TISSUE
Status: COMPLETED | OUTPATIENT
Start: 2022-07-29 | End: 2022-07-29

## 2022-07-29 RX ORDER — HYDROCODONE BITARTRATE AND ACETAMINOPHEN 10; 325 MG/1; MG/1
1 TABLET ORAL EVERY 8 HOURS PRN
Qty: 90 TABLET | Refills: 0 | Status: SHIPPED | OUTPATIENT
Start: 2022-09-23 | End: 2022-10-28 | Stop reason: SDUPTHER

## 2022-07-29 RX ORDER — ATORVASTATIN CALCIUM 20 MG/1
TABLET, FILM COATED ORAL
COMMUNITY
Start: 2022-06-28 | End: 2023-06-06 | Stop reason: ALTCHOICE

## 2022-07-29 RX ORDER — HYDROCODONE BITARTRATE AND ACETAMINOPHEN 10; 325 MG/1; MG/1
1 TABLET ORAL EVERY 8 HOURS PRN
Qty: 90 TABLET | Refills: 0 | Status: SHIPPED | OUTPATIENT
Start: 2022-07-29 | End: 2022-08-28

## 2022-07-29 RX ORDER — KETOROLAC TROMETHAMINE 30 MG/ML
60 INJECTION, SOLUTION INTRAMUSCULAR; INTRAVENOUS
Status: COMPLETED | OUTPATIENT
Start: 2022-07-29 | End: 2022-07-29

## 2022-07-29 RX ORDER — SULFASALAZINE 500 MG/1
1000 TABLET, DELAYED RELEASE ORAL 2 TIMES DAILY
Qty: 120 TABLET | Refills: 12 | Status: SHIPPED | OUTPATIENT
Start: 2022-07-29 | End: 2022-11-02 | Stop reason: SDUPTHER

## 2022-07-29 RX ADMIN — METHYLPREDNISOLONE ACETATE 160 MG: 80 INJECTION, SUSPENSION INTRA-ARTICULAR; INTRALESIONAL; INTRAMUSCULAR; SOFT TISSUE at 04:07

## 2022-07-29 RX ADMIN — CYANOCOBALAMIN 1000 MCG: 1000 INJECTION, SOLUTION INTRAMUSCULAR; SUBCUTANEOUS at 04:07

## 2022-07-29 RX ADMIN — KETOROLAC TROMETHAMINE 60 MG: 30 INJECTION, SOLUTION INTRAMUSCULAR; INTRAVENOUS at 04:07

## 2022-07-29 ASSESSMENT — ROUTINE ASSESSMENT OF PATIENT INDEX DATA (RAPID3)
FATIGUE SCORE: 1.1
PSYCHOLOGICAL DISTRESS SCORE: 2.2
MDHAQ FUNCTION SCORE: 1
TOTAL RAPID3 SCORE: 5.44
PAIN SCORE: 7.5
PATIENT GLOBAL ASSESSMENT SCORE: 5.5

## 2022-07-29 NOTE — PROGRESS NOTES
Subjective:       Patient ID: Susana Andersen is a 57 y.o. female.    Chief Complaint: Disease Management    Follow up: 57  year old female who presents to clinic for follow up on seronegative rheumatoid arthritis. She has been compliant with SSZ 1000 mg bid.  She is taking prednisone 2.5 mg 2-3 days a week as needed for pain. She will occasionally take helder at bedtime if having a severe flare. She is taking robaxin and mobic only PRN. She continues to have pain and swelling in her hands R>L and wrist. She has intermittent neck pain, but no radicular sx or weakness. No serious infections since his last visit.      Current tx:  1. SSZ  2. Norco  3. Gabapentin              She complains of joint swelling. Associated symptoms include myalgias. Pertinent negatives include no dysuria, fatigue, fever or headaches.         Review of Systems   Constitutional: Positive for activity change. Negative for appetite change, chills, fatigue and fever.   Eyes: Negative for visual disturbance.   Respiratory: Negative for cough and shortness of breath.    Cardiovascular: Negative for chest pain, palpitations and leg swelling.   Gastrointestinal: Negative for abdominal pain, constipation, diarrhea, nausea and vomiting.   Genitourinary: Negative for dysuria.   Musculoskeletal: Positive for arthralgias, joint swelling and myalgias.   Allergic/Immunologic: Negative for immunocompromised state.   Neurological: Negative for dizziness, weakness, light-headedness and headaches.         Objective:     Vitals:    07/29/22 1412   BP: 136/86   Pulse: (!) 57       Past Medical History:   Diagnosis Date    Allergy     Arthritis     Colon polyps 07/29/2021    Hypertension      Past Surgical History:   Procedure Laterality Date    COLONOSCOPY N/A 7/29/2021    Procedure: COLONOSCOPY;  Surgeon: Edgar Bolaños MD;  Location: [facility];  Service: Endoscopy;  Laterality: N/A;    COLONOSCOPY W/ BIOPSIES            Physical Exam   Eyes: Right  conjunctiva is not injected. Left conjunctiva is not injected. Right eye exhibits normal extraocular motion. Left eye exhibits normal extraocular motion.   Neck: No JVD present. No thyromegaly present.   Cardiovascular: Normal rate and regular rhythm. Exam reveals no decreased pulses.   Pulmonary/Chest: She has no wheezes. She has no rhonchi. She has no rales.   Musculoskeletal:      Right shoulder: Normal.      Left shoulder: Normal.      Right elbow: Normal.      Left elbow: Normal.      Right wrist: Swelling and tenderness present.      Left wrist: Swelling and tenderness present.      Right knee: Tenderness present.      Left knee: Tenderness present.   Lymphadenopathy:     She has no cervical adenopathy.   Neurological: Gait normal.   Skin: No rash noted.   Psychiatric: Mood and affect normal.       Right Side Rheumatological Exam     Examination finds the shoulder, elbow, 1st MCP, 4th MCP and 5th MCP normal.    The patient is tender to palpation of the wrist, knee, 1st PIP, 2nd PIP, 2nd MCP, 3rd PIP, 3rd MCP, 4th PIP and 5th PIP    She has swelling of the wrist, 1st PIP, 2nd PIP, 2nd MCP, 3rd PIP, 3rd MCP, 4th PIP and 5th PIP    Left Side Rheumatological Exam     Examination finds the shoulder, elbow, 1st PIP, 1st MCP, 2nd PIP, 2nd MCP, 3rd PIP, 3rd MCP, 4th PIP, 4th MCP, 5th PIP and 5th MCP normal.    The patient is tender to palpation of the wrist and knee.    She has swelling of the wrist            Labs reviewed:  Component      Latest Ref Rng & Units 2/11/2022   WBC      3.90 - 12.70 K/uL 4.40   RBC      4.00 - 5.40 M/uL 4.65   Hemoglobin      12.0 - 16.0 g/dL 12.6   Hematocrit      37.0 - 48.5 % 40.1   MCV      82 - 98 fL 86   MCH      27.0 - 31.0 pg 27.1   MCHC      32.0 - 36.0 g/dL 31.4 (L)   RDW      11.5 - 14.5 % 13.1   Platelets      150 - 450 K/uL 378   MPV      9.2 - 12.9 fL 10.8   Immature Granulocytes      0.0 - 0.5 % 0.2   Gran # (ANC)      1.8 - 7.7 K/uL 1.9   Immature Grans (Abs)      0.00 -  0.04 K/uL 0.01   Lymph #      1.0 - 4.8 K/uL 2.1   Mono #      0.3 - 1.0 K/uL 0.4   Eos #      0.0 - 0.5 K/uL 0.1   Baso #      0.00 - 0.20 K/uL 0.03   nRBC      0 /100 WBC 0   Gran %      38.0 - 73.0 % 42.9   Lymph %      18.0 - 48.0 % 46.6   Mono %      4.0 - 15.0 % 8.0   Eosinophil %      0.0 - 8.0 % 1.6   Basophil %      0.0 - 1.9 % 0.7   Differential Method       Automated   Sodium      136 - 145 mmol/L 140   Potassium      3.5 - 5.1 mmol/L 4.2   Chloride      95 - 110 mmol/L 101   CO2      23 - 29 mmol/L 30 (H)   Glucose      70 - 110 mg/dL 101   BUN      6 - 20 mg/dL 9   Creatinine      0.5 - 1.4 mg/dL 0.8   Calcium      8.7 - 10.5 mg/dL 10.3   PROTEIN TOTAL      6.0 - 8.4 g/dL 7.0   Albumin      3.5 - 5.2 g/dL 3.7   BILIRUBIN TOTAL      0.1 - 1.0 mg/dL 0.5   Alkaline Phosphatase      55 - 135 U/L 58   AST      10 - 40 U/L 17   ALT      10 - 44 U/L 14   Anion Gap      8 - 16 mmol/L 9   eGFR if African American      >60 mL/min/1.73 m:2 >60.0   eGFR if non African American      >60 mL/min/1.73 m:2 >60.0   Specimen UA       Urine, Clean Catch   Color, UA      Yellow, Straw, Kendra Kendra   Appearance, UA      Clear Clear   pH, UA      5.0 - 8.0 5.0   Specific Gravity, UA      1.005 - 1.030 1.020   Protein, UA      Negative Negative   Glucose, UA      Negative Negative   Ketones, UA      Negative Negative   Bilirubin (UA)      Negative Negative   Occult Blood UA      Negative Negative   NITRITE UA      Negative Negative   Leukocytes, UA      Negative Trace (A)   CRP      0.0 - 8.2 mg/L 7.2   Sed Rate      0 - 36 mm/Hr 26     Assessment:       1. Immunocompromised state due to drug therapy    2. Seronegative rheumatoid arthritis    3. Chronic pain syndrome    4. Neck pain, acute            Plan:       Immunocompromised state due to drug therapy  -     gabapentin (NEURONTIN) 400 MG capsule; Take 1 capsule (400 mg total) by mouth 3 (three) times daily.  Dispense: 90 capsule; Refill: 11  -     sulfaSALAzine  (AZULFIDINE) 500 MG EC tablet; Take 2 tablets (1,000 mg total) by mouth 2 (two) times daily.  Dispense: 120 tablet; Refill: 12  -     CBC Auto Differential; Future; Expected date: 07/29/2022  -     Sedimentation rate; Future; Expected date: 07/29/2022  -     C-Reactive Protein; Future; Expected date: 07/29/2022  -     TSH; Future; Expected date: 07/29/2022  -     T4, Free; Future; Expected date: 07/29/2022  -     Anti-Thyroglobulin Antibody; Future; Expected date: 07/29/2022  -     Thyroid Peroxidase Antibody; Future; Expected date: 07/29/2022  -     ketorolac injection 60 mg  -     cyanocobalamin injection 1,000 mcg  -     methylPREDNISolone acetate injection 160 mg  -     HYDROcodone-acetaminophen (NORCO)  mg per tablet; Take 1 tablet by mouth every 8 (eight) hours as needed for Pain.  Dispense: 90 tablet; Refill: 0  -     HYDROcodone-acetaminophen (NORCO)  mg per tablet; Take 1 tablet by mouth every 8 (eight) hours as needed for Pain.  Dispense: 90 tablet; Refill: 0  -     HYDROcodone-acetaminophen (NORCO)  mg per tablet; Take 1 tablet by mouth every 8 (eight) hours as needed for Pain.  Dispense: 90 tablet; Refill: 0    Seronegative rheumatoid arthritis  -     gabapentin (NEURONTIN) 400 MG capsule; Take 1 capsule (400 mg total) by mouth 3 (three) times daily.  Dispense: 90 capsule; Refill: 11  -     sulfaSALAzine (AZULFIDINE) 500 MG EC tablet; Take 2 tablets (1,000 mg total) by mouth 2 (two) times daily.  Dispense: 120 tablet; Refill: 12  -     CBC Auto Differential; Future; Expected date: 07/29/2022  -     Sedimentation rate; Future; Expected date: 07/29/2022  -     C-Reactive Protein; Future; Expected date: 07/29/2022  -     TSH; Future; Expected date: 07/29/2022  -     T4, Free; Future; Expected date: 07/29/2022  -     Anti-Thyroglobulin Antibody; Future; Expected date: 07/29/2022  -     Thyroid Peroxidase Antibody; Future; Expected date: 07/29/2022  -     ketorolac injection 60 mg  -      cyanocobalamin injection 1,000 mcg  -     methylPREDNISolone acetate injection 160 mg  -     HYDROcodone-acetaminophen (NORCO)  mg per tablet; Take 1 tablet by mouth every 8 (eight) hours as needed for Pain.  Dispense: 90 tablet; Refill: 0  -     HYDROcodone-acetaminophen (NORCO)  mg per tablet; Take 1 tablet by mouth every 8 (eight) hours as needed for Pain.  Dispense: 90 tablet; Refill: 0  -     HYDROcodone-acetaminophen (NORCO)  mg per tablet; Take 1 tablet by mouth every 8 (eight) hours as needed for Pain.  Dispense: 90 tablet; Refill: 0    Chronic pain syndrome  -     gabapentin (NEURONTIN) 400 MG capsule; Take 1 capsule (400 mg total) by mouth 3 (three) times daily.  Dispense: 90 capsule; Refill: 11  -     sulfaSALAzine (AZULFIDINE) 500 MG EC tablet; Take 2 tablets (1,000 mg total) by mouth 2 (two) times daily.  Dispense: 120 tablet; Refill: 12  -     CBC Auto Differential; Future; Expected date: 07/29/2022  -     Sedimentation rate; Future; Expected date: 07/29/2022  -     C-Reactive Protein; Future; Expected date: 07/29/2022  -     TSH; Future; Expected date: 07/29/2022  -     T4, Free; Future; Expected date: 07/29/2022  -     Anti-Thyroglobulin Antibody; Future; Expected date: 07/29/2022  -     Thyroid Peroxidase Antibody; Future; Expected date: 07/29/2022  -     ketorolac injection 60 mg  -     cyanocobalamin injection 1,000 mcg  -     methylPREDNISolone acetate injection 160 mg  -     HYDROcodone-acetaminophen (NORCO)  mg per tablet; Take 1 tablet by mouth every 8 (eight) hours as needed for Pain.  Dispense: 90 tablet; Refill: 0  -     HYDROcodone-acetaminophen (NORCO)  mg per tablet; Take 1 tablet by mouth every 8 (eight) hours as needed for Pain.  Dispense: 90 tablet; Refill: 0  -     HYDROcodone-acetaminophen (NORCO)  mg per tablet; Take 1 tablet by mouth every 8 (eight) hours as needed for Pain.  Dispense: 90 tablet; Refill: 0    Neck pain, acute  -     gabapentin  (NEURONTIN) 400 MG capsule; Take 1 capsule (400 mg total) by mouth 3 (three) times daily.  Dispense: 90 capsule; Refill: 11  -     sulfaSALAzine (AZULFIDINE) 500 MG EC tablet; Take 2 tablets (1,000 mg total) by mouth 2 (two) times daily.  Dispense: 120 tablet; Refill: 12  -     CBC Auto Differential; Future; Expected date: 07/29/2022  -     Sedimentation rate; Future; Expected date: 07/29/2022  -     C-Reactive Protein; Future; Expected date: 07/29/2022  -     TSH; Future; Expected date: 07/29/2022  -     T4, Free; Future; Expected date: 07/29/2022  -     Anti-Thyroglobulin Antibody; Future; Expected date: 07/29/2022  -     Thyroid Peroxidase Antibody; Future; Expected date: 07/29/2022  -     ketorolac injection 60 mg  -     cyanocobalamin injection 1,000 mcg  -     methylPREDNISolone acetate injection 160 mg  -     HYDROcodone-acetaminophen (NORCO)  mg per tablet; Take 1 tablet by mouth every 8 (eight) hours as needed for Pain.  Dispense: 90 tablet; Refill: 0  -     HYDROcodone-acetaminophen (NORCO)  mg per tablet; Take 1 tablet by mouth every 8 (eight) hours as needed for Pain.  Dispense: 90 tablet; Refill: 0  -     HYDROcodone-acetaminophen (NORCO)  mg per tablet; Take 1 tablet by mouth every 8 (eight) hours as needed for Pain.  Dispense: 90 tablet; Refill: 0          Assessment:  56 year old female with  Seronegative RA  --osteoarthritis  --chronic pain syndrome    Plan:  1. toradol 60, dexa 4, depo 80, b12 today  2. Cont SSZ 1000 mg bid  3. Cont robaxin 500 mg tid prn, continue mobic prn  4. Cont. norco prn   I have checked [place] prescription monitoring program site and no unusual or abnormal behavior has occurred pt understand the risk and benefits of taking opioid medications and has decided to continue the medication.  5. Margarita prn   no cheange in tx at this time

## 2022-07-29 NOTE — PROGRESS NOTES
2 pt identifiers used  Allergies reviewed      Administered 2 cc ( 30 mg/ml ) of toradol to the left upper outer gluteal. Patient tolerated injections well. Informed of s/s to report verbalized understanding. No adverse reactions noted.       Administered 2 cc ( 80 mg/ml ) of depomedrol to the right upper outer gluteal. Informed of s/s to report verbalized understanding. No adverse reactions noted.      Administered 1 cc ( 1000 mcg/ml ) of b12 to the left upper outer arm. Informed of s/s to report verbalized understanding. No adverse reactions noted.        Left facility in stable condition.

## 2022-10-20 ENCOUNTER — LAB VISIT (OUTPATIENT)
Dept: LAB | Facility: CLINIC | Age: 57
End: 2022-10-20
Payer: COMMERCIAL

## 2022-10-20 DIAGNOSIS — Z79.899 IMMUNOCOMPROMISED STATE DUE TO DRUG THERAPY: ICD-10-CM

## 2022-10-20 DIAGNOSIS — G89.4 CHRONIC PAIN SYNDROME: ICD-10-CM

## 2022-10-20 DIAGNOSIS — D84.821 IMMUNOCOMPROMISED STATE DUE TO DRUG THERAPY: ICD-10-CM

## 2022-10-20 DIAGNOSIS — M54.2 NECK PAIN, ACUTE: ICD-10-CM

## 2022-10-20 DIAGNOSIS — M06.00 SERONEGATIVE RHEUMATOID ARTHRITIS: ICD-10-CM

## 2022-10-20 LAB
BASOPHILS # BLD AUTO: 0.02 K/UL (ref 0–0.2)
BASOPHILS NFR BLD: 0.3 % (ref 0–1.9)
CRP SERPL-MCNC: 6.6 MG/L (ref 0–8.2)
DIFFERENTIAL METHOD: ABNORMAL
EOSINOPHIL # BLD AUTO: 0.1 K/UL (ref 0–0.5)
EOSINOPHIL NFR BLD: 1.3 % (ref 0–8)
ERYTHROCYTE [DISTWIDTH] IN BLOOD BY AUTOMATED COUNT: 14.4 % (ref 11.5–14.5)
ERYTHROCYTE [SEDIMENTATION RATE] IN BLOOD BY WESTERGREN METHOD: 11 MM/HR (ref 0–20)
HCT VFR BLD AUTO: 39 % (ref 37–48.5)
HGB BLD-MCNC: 12.6 G/DL (ref 12–16)
IMM GRANULOCYTES # BLD AUTO: 0.01 K/UL (ref 0–0.04)
IMM GRANULOCYTES NFR BLD AUTO: 0.1 % (ref 0–0.5)
LYMPHOCYTES # BLD AUTO: 3.4 K/UL (ref 1–4.8)
LYMPHOCYTES NFR BLD: 49.9 % (ref 18–48)
MCH RBC QN AUTO: 28.4 PG (ref 27–31)
MCHC RBC AUTO-ENTMCNC: 32.3 G/DL (ref 32–36)
MCV RBC AUTO: 88 FL (ref 82–98)
MONOCYTES # BLD AUTO: 0.5 K/UL (ref 0.3–1)
MONOCYTES NFR BLD: 7.4 % (ref 4–15)
NEUTROPHILS # BLD AUTO: 2.8 K/UL (ref 1.8–7.7)
NEUTROPHILS NFR BLD: 41 % (ref 38–73)
NRBC BLD-RTO: 0 /100 WBC
PLATELET # BLD AUTO: 331 K/UL (ref 150–450)
PMV BLD AUTO: 11.4 FL (ref 9.2–12.9)
RBC # BLD AUTO: 4.43 M/UL (ref 4–5.4)
T4 FREE SERPL-MCNC: 1.06 NG/DL (ref 0.71–1.51)
TSH SERPL DL<=0.005 MIU/L-ACNC: 1.72 UIU/ML (ref 0.4–4)
WBC # BLD AUTO: 6.8 K/UL (ref 3.9–12.7)

## 2022-10-20 PROCEDURE — 85651 RBC SED RATE NONAUTOMATED: CPT | Performed by: INTERNAL MEDICINE

## 2022-10-20 PROCEDURE — 86376 MICROSOMAL ANTIBODY EACH: CPT | Performed by: INTERNAL MEDICINE

## 2022-10-20 PROCEDURE — 86140 C-REACTIVE PROTEIN: CPT | Performed by: INTERNAL MEDICINE

## 2022-10-20 PROCEDURE — 85025 COMPLETE CBC W/AUTO DIFF WBC: CPT | Performed by: INTERNAL MEDICINE

## 2022-10-20 PROCEDURE — 84443 ASSAY THYROID STIM HORMONE: CPT | Performed by: INTERNAL MEDICINE

## 2022-10-20 PROCEDURE — 86800 THYROGLOBULIN ANTIBODY: CPT | Performed by: INTERNAL MEDICINE

## 2022-10-20 PROCEDURE — 84439 ASSAY OF FREE THYROXINE: CPT | Performed by: INTERNAL MEDICINE

## 2022-10-21 LAB
THYROGLOB AB SERPL IA-ACNC: <4 IU/ML (ref 0–3.9)
THYROPEROXIDASE IGG SERPL-ACNC: <6 IU/ML

## 2022-11-02 ENCOUNTER — OFFICE VISIT (OUTPATIENT)
Dept: RHEUMATOLOGY | Facility: CLINIC | Age: 57
End: 2022-11-02
Payer: COMMERCIAL

## 2022-11-02 VITALS
SYSTOLIC BLOOD PRESSURE: 143 MMHG | BODY MASS INDEX: 31.92 KG/M2 | HEIGHT: 64 IN | DIASTOLIC BLOOD PRESSURE: 84 MMHG | WEIGHT: 187 LBS | HEART RATE: 73 BPM

## 2022-11-02 DIAGNOSIS — G47.00 INSOMNIA, UNSPECIFIED TYPE: ICD-10-CM

## 2022-11-02 DIAGNOSIS — G89.4 CHRONIC PAIN SYNDROME: ICD-10-CM

## 2022-11-02 DIAGNOSIS — Z79.899 ENCOUNTER FOR MONITORING SULFASALAZINE THERAPY: ICD-10-CM

## 2022-11-02 DIAGNOSIS — Z51.81 ENCOUNTER FOR MONITORING SULFASALAZINE THERAPY: ICD-10-CM

## 2022-11-02 DIAGNOSIS — M15.9 OSTEOARTHRITIS OF MULTIPLE JOINTS, UNSPECIFIED OSTEOARTHRITIS TYPE: ICD-10-CM

## 2022-11-02 DIAGNOSIS — M06.00 SERONEGATIVE RHEUMATOID ARTHRITIS: ICD-10-CM

## 2022-11-02 DIAGNOSIS — M54.2 NECK PAIN, ACUTE: ICD-10-CM

## 2022-11-02 DIAGNOSIS — D84.821 IMMUNOCOMPROMISED STATE DUE TO DRUG THERAPY: ICD-10-CM

## 2022-11-02 DIAGNOSIS — Z79.899 IMMUNOCOMPROMISED STATE DUE TO DRUG THERAPY: ICD-10-CM

## 2022-11-02 DIAGNOSIS — M06.00 SERONEGATIVE RHEUMATOID ARTHRITIS: Primary | ICD-10-CM

## 2022-11-02 PROCEDURE — 1159F PR MEDICATION LIST DOCUMENTED IN MEDICAL RECORD: ICD-10-PCS | Mod: CPTII,S$GLB,, | Performed by: PHYSICIAN ASSISTANT

## 2022-11-02 PROCEDURE — 99999 PR PBB SHADOW E&M-EST. PATIENT-LVL III: ICD-10-PCS | Mod: PBBFAC,,, | Performed by: PHYSICIAN ASSISTANT

## 2022-11-02 PROCEDURE — 3008F BODY MASS INDEX DOCD: CPT | Mod: CPTII,S$GLB,, | Performed by: PHYSICIAN ASSISTANT

## 2022-11-02 PROCEDURE — 99214 PR OFFICE/OUTPT VISIT, EST, LEVL IV, 30-39 MIN: ICD-10-PCS | Mod: 25,S$GLB,, | Performed by: PHYSICIAN ASSISTANT

## 2022-11-02 PROCEDURE — 96372 PR INJECTION,THERAP/PROPH/DIAG2ST, IM OR SUBCUT: ICD-10-PCS | Mod: S$GLB,,, | Performed by: PHYSICIAN ASSISTANT

## 2022-11-02 PROCEDURE — 3008F PR BODY MASS INDEX (BMI) DOCUMENTED: ICD-10-PCS | Mod: CPTII,S$GLB,, | Performed by: PHYSICIAN ASSISTANT

## 2022-11-02 PROCEDURE — 99214 OFFICE O/P EST MOD 30 MIN: CPT | Mod: 25,S$GLB,, | Performed by: PHYSICIAN ASSISTANT

## 2022-11-02 PROCEDURE — 1159F MED LIST DOCD IN RCRD: CPT | Mod: CPTII,S$GLB,, | Performed by: PHYSICIAN ASSISTANT

## 2022-11-02 PROCEDURE — 3077F PR MOST RECENT SYSTOLIC BLOOD PRESSURE >= 140 MM HG: ICD-10-PCS | Mod: CPTII,S$GLB,, | Performed by: PHYSICIAN ASSISTANT

## 2022-11-02 PROCEDURE — 96372 THER/PROPH/DIAG INJ SC/IM: CPT | Mod: S$GLB,,, | Performed by: PHYSICIAN ASSISTANT

## 2022-11-02 PROCEDURE — 99999 PR PBB SHADOW E&M-EST. PATIENT-LVL III: CPT | Mod: PBBFAC,,, | Performed by: PHYSICIAN ASSISTANT

## 2022-11-02 PROCEDURE — 3079F DIAST BP 80-89 MM HG: CPT | Mod: CPTII,S$GLB,, | Performed by: PHYSICIAN ASSISTANT

## 2022-11-02 PROCEDURE — 3079F PR MOST RECENT DIASTOLIC BLOOD PRESSURE 80-89 MM HG: ICD-10-PCS | Mod: CPTII,S$GLB,, | Performed by: PHYSICIAN ASSISTANT

## 2022-11-02 PROCEDURE — 3077F SYST BP >= 140 MM HG: CPT | Mod: CPTII,S$GLB,, | Performed by: PHYSICIAN ASSISTANT

## 2022-11-02 RX ORDER — KETOROLAC TROMETHAMINE 30 MG/ML
60 INJECTION, SOLUTION INTRAMUSCULAR; INTRAVENOUS
Status: COMPLETED | OUTPATIENT
Start: 2022-11-02 | End: 2022-11-02

## 2022-11-02 RX ORDER — SULFASALAZINE 500 MG/1
1000 TABLET, DELAYED RELEASE ORAL 2 TIMES DAILY
Qty: 120 TABLET | Refills: 12 | Status: SHIPPED | OUTPATIENT
Start: 2022-11-02 | End: 2023-03-30 | Stop reason: SDUPTHER

## 2022-11-02 RX ORDER — CYANOCOBALAMIN 1000 UG/ML
1000 INJECTION, SOLUTION INTRAMUSCULAR; SUBCUTANEOUS
Status: COMPLETED | OUTPATIENT
Start: 2022-11-02 | End: 2022-11-02

## 2022-11-02 RX ORDER — METHYLPREDNISOLONE ACETATE 80 MG/ML
160 INJECTION, SUSPENSION INTRA-ARTICULAR; INTRALESIONAL; INTRAMUSCULAR; SOFT TISSUE
Status: COMPLETED | OUTPATIENT
Start: 2022-11-02 | End: 2022-11-02

## 2022-11-02 RX ORDER — METHOCARBAMOL 500 MG/1
500 TABLET, FILM COATED ORAL 3 TIMES DAILY PRN
Qty: 90 TABLET | Refills: 5 | Status: SHIPPED | OUTPATIENT
Start: 2022-11-02 | End: 2023-03-30 | Stop reason: SDUPTHER

## 2022-11-02 RX ORDER — AMITRIPTYLINE HYDROCHLORIDE 10 MG/1
10 TABLET, FILM COATED ORAL NIGHTLY PRN
Qty: 30 TABLET | Refills: 5 | Status: SHIPPED | OUTPATIENT
Start: 2022-11-02 | End: 2023-09-01 | Stop reason: SDUPTHER

## 2022-11-02 RX ORDER — PREDNISONE 2.5 MG/1
2.5 TABLET ORAL DAILY
Qty: 90 TABLET | Refills: 1 | Status: SHIPPED | OUTPATIENT
Start: 2022-11-02 | End: 2023-03-30 | Stop reason: SDUPTHER

## 2022-11-02 RX ADMIN — KETOROLAC TROMETHAMINE 60 MG: 30 INJECTION, SOLUTION INTRAMUSCULAR; INTRAVENOUS at 09:11

## 2022-11-02 RX ADMIN — CYANOCOBALAMIN 1000 MCG: 1000 INJECTION, SOLUTION INTRAMUSCULAR; SUBCUTANEOUS at 09:11

## 2022-11-02 RX ADMIN — METHYLPREDNISOLONE ACETATE 160 MG: 80 INJECTION, SUSPENSION INTRA-ARTICULAR; INTRALESIONAL; INTRAMUSCULAR; SOFT TISSUE at 09:11

## 2022-11-02 NOTE — PROGRESS NOTES
2 pt identifiers used  Allergies reviewed      Administered 2 cc ( 30 mg/ml ) of toradol to the left upper outer gluteal. Patient tolerated injections well. Informed of s/s to report verbalized understanding. No adverse reactions noted. Left facility in stable condition.      Administered 2 cc ( 80 mg/ml ) of depomedrol to the right upper outer gluteal. Informed of s/s to report verbalized understanding. No adverse reactions noted.      Administered 1 cc ( 1000 mcg/ml ) of b12 to the left upper outer arm. Informed of s/s to report verbalized understanding. No adverse reactions noted.

## 2022-11-02 NOTE — PROGRESS NOTES
Subjective:       Patient ID: Susana Andersen is a 57 y.o. female.    Chief Complaint: Disease Management    Mrs. Andersen is a 57 year old female who presents to clinic for follow up on seronegative rheumatoid arthritis. She has been compliant with SSZ 1000 mg bid. Xeljanz was considered at her last visit, but she has not started this. She is taking prednisone 2.5 mg 2-3 days a week as needed for pain. She will occasionally take helder at bedtime if having a severe flare. She is taking robaxin and mobic only PRN. She continues to have pain and swelling in her hands R>L and wrist. She has intermittent neck pain, but no radicular sx or weakness.  She is prescribed norco for severe pain, which is helpful.    She reports some difficulty with sleep initiation.     She had covid infection in August--treated with paxlovid and sx were relatively mild sinus/HA and muscle spasms x 1 day.     We reviewed her recent labs.     Current tx:  1. SSZ  2. Norco  3. Gabapentin  4. Mobic    Review of Systems   Constitutional:  Positive for activity change. Negative for appetite change, chills, fatigue and fever.   Eyes:  Negative for visual disturbance.   Respiratory:  Negative for cough and shortness of breath.    Cardiovascular:  Negative for chest pain, palpitations and leg swelling.   Gastrointestinal:  Negative for abdominal pain, constipation, diarrhea, nausea and vomiting.   Genitourinary:  Negative for dysuria.   Musculoskeletal:  Positive for arthralgias, joint swelling and myalgias.   Allergic/Immunologic: Negative for immunocompromised state.   Neurological:  Negative for dizziness, weakness, light-headedness and headaches.       Objective:     Vitals:    11/02/22 0808   BP: (!) 143/84   Pulse: 73       Past Medical History:   Diagnosis Date    Allergy     Arthritis     Colon polyps 07/29/2021    Hypertension      Past Surgical History:   Procedure Laterality Date    COLONOSCOPY N/A 7/29/2021    Procedure: COLONOSCOPY;  Surgeon:  Edgar Bolaños MD;  Location: [facility];  Service: Endoscopy;  Laterality: N/A;    COLONOSCOPY W/ BIOPSIES            Physical Exam   Eyes: Right conjunctiva is not injected. Left conjunctiva is not injected. Right eye exhibits normal extraocular motion. Left eye exhibits normal extraocular motion.   Neck: No JVD present. No thyromegaly present.   Cardiovascular: Normal rate and regular rhythm. Exam reveals no decreased pulses.   Pulmonary/Chest: Effort normal.   Musculoskeletal:      Right shoulder: Normal.      Left shoulder: Normal.      Right elbow: Normal.      Left elbow: Normal.      Right wrist: Swelling and tenderness present.      Left wrist: Swelling and tenderness present.      Right knee: Tenderness present.      Left knee: Tenderness present.   Lymphadenopathy:     She has no cervical adenopathy.   Neurological: Gait normal.   Skin: No rash noted.   Psychiatric: Mood and affect normal.       Right Side Rheumatological Exam     Examination finds the shoulder, elbow, 1st MCP, 4th MCP and 5th MCP normal.    The patient is tender to palpation of the wrist, knee, 1st PIP, 2nd PIP, 2nd MCP, 3rd PIP, 3rd MCP, 4th PIP and 5th PIP    She has swelling of the wrist, 1st PIP, 2nd PIP, 2nd MCP, 3rd PIP, 3rd MCP, 4th PIP and 5th PIP    Left Side Rheumatological Exam     Examination finds the shoulder, elbow, 1st PIP, 1st MCP, 2nd PIP, 2nd MCP, 3rd PIP, 3rd MCP, 4th PIP, 4th MCP, 5th PIP and 5th MCP normal.    The patient is tender to palpation of the wrist and knee.    She has swelling of the wrist          Labs reviewed:  Component      Latest Ref Rng & Units 10/20/2022   WBC      3.90 - 12.70 K/uL 6.80   RBC      4.00 - 5.40 M/uL 4.43   Hemoglobin      12.0 - 16.0 g/dL 12.6   Hematocrit      37.0 - 48.5 % 39.0   MCV      82 - 98 fL 88   MCH      27.0 - 31.0 pg 28.4   MCHC      32.0 - 36.0 g/dL 32.3   RDW      11.5 - 14.5 % 14.4   Platelets      150 - 450 K/uL 331   MPV      9.2 - 12.9 fL 11.4   Immature  Granulocytes      0.0 - 0.5 % 0.1   Gran # (ANC)      1.8 - 7.7 K/uL 2.8   Immature Grans (Abs)      0.00 - 0.04 K/uL 0.01   Lymph #      1.0 - 4.8 K/uL 3.4   Mono #      0.3 - 1.0 K/uL 0.5   Eos #      0.0 - 0.5 K/uL 0.1   Baso #      0.00 - 0.20 K/uL 0.02   nRBC      0 /100 WBC 0   Gran %      38.0 - 73.0 % 41.0   Lymph %      18.0 - 48.0 % 49.9 (H)   Mono %      4.0 - 15.0 % 7.4   Eosinophil %      0.0 - 8.0 % 1.3   Basophil %      0.0 - 1.9 % 0.3   Differential Method       Automated   Sed Rate      0 - 20 mm/Hr 11   CRP      0.0 - 8.2 mg/L 6.6   TSH      0.400 - 4.000 uIU/mL 1.720   Free T4      0.71 - 1.51 ng/dL 1.06   Thyroglobulin Ab Screen      0.0 - 3.9 IU/mL <4.0   Thyroperoxidase Antibodies      <6.0 IU/mL <6.0     Assessment:       1. Seronegative rheumatoid arthritis    2. Encounter for monitoring sulfasalazine therapy    3. Osteoarthritis of multiple joints, unspecified osteoarthritis type    4. Chronic pain syndrome    5. Insomnia, unspecified type            Plan:       Seronegative rheumatoid arthritis  -     sulfaSALAzine (AZULFIDINE) 500 MG EC tablet; Take 2 tablets (1,000 mg total) by mouth 2 (two) times daily.  Dispense: 120 tablet; Refill: 12  -     predniSONE (DELTASONE) 2.5 MG tablet; Take 1 tablet (2.5 mg total) by mouth once daily.  Dispense: 90 tablet; Refill: 1  -     methocarbamoL (ROBAXIN) 500 MG Tab; Take 1 tablet (500 mg total) by mouth 3 (three) times daily as needed (muscle spasms).  Dispense: 90 tablet; Refill: 5  -     ketorolac injection 60 mg  -     methylPREDNISolone acetate injection 160 mg  -     cyanocobalamin injection 1,000 mcg  -     CBC Auto Differential; Future; Expected date: 11/02/2022  -     Comprehensive Metabolic Panel; Future; Expected date: 11/02/2022  -     C-Reactive Protein; Future; Expected date: 11/02/2022  -     Sedimentation rate; Future; Expected date: 11/02/2022    Encounter for monitoring sulfasalazine therapy    Osteoarthritis of multiple joints,  unspecified osteoarthritis type    Chronic pain syndrome    Insomnia, unspecified type  -     amitriptyline (ELAVIL) 10 MG tablet; Take 1 tablet (10 mg total) by mouth nightly as needed for Insomnia.  Dispense: 30 tablet; Refill: 5        Assessment:  57 year old female with  Seronegative RA on SSZ  --osteoarthritis  --chronic pain syndrome    Plan:  1. toradol 60, depo 160, b12 today  2. Cont SSZ 1000 mg bid  3. Cont robaxin 500 mg tid prn, continue mobic prn  4. Cont. norco prn per MD.  I have checked [place] prescription monitoring program site and no unusual or abnormal behavior has occurred pt understand the risk and benefits of taking opioid medications and has decided to continue the medication. Pended x 3 months.  5. Margarita prn  6. Add elavil 10 mg qhs    Follow up:  4 mo Dr. Vania harmon/labs prior

## 2022-11-06 RX ORDER — HYDROCODONE BITARTRATE AND ACETAMINOPHEN 10; 325 MG/1; MG/1
1 TABLET ORAL EVERY 8 HOURS PRN
Qty: 90 TABLET | Refills: 0 | Status: SHIPPED | OUTPATIENT
Start: 2022-11-27 | End: 2022-12-27

## 2022-11-06 RX ORDER — HYDROCODONE BITARTRATE AND ACETAMINOPHEN 10; 325 MG/1; MG/1
1 TABLET ORAL EVERY 8 HOURS PRN
Qty: 90 TABLET | Refills: 0 | Status: SHIPPED | OUTPATIENT
Start: 2023-01-26 | End: 2023-02-23 | Stop reason: SDUPTHER

## 2022-11-06 RX ORDER — HYDROCODONE BITARTRATE AND ACETAMINOPHEN 10; 325 MG/1; MG/1
1 TABLET ORAL EVERY 8 HOURS PRN
Qty: 90 TABLET | Refills: 0 | Status: SHIPPED | OUTPATIENT
Start: 2022-12-27 | End: 2023-01-26

## 2023-02-23 DIAGNOSIS — M06.00 SERONEGATIVE RHEUMATOID ARTHRITIS: ICD-10-CM

## 2023-02-23 DIAGNOSIS — Z79.899 IMMUNOCOMPROMISED STATE DUE TO DRUG THERAPY: ICD-10-CM

## 2023-02-23 DIAGNOSIS — D84.821 IMMUNOCOMPROMISED STATE DUE TO DRUG THERAPY: ICD-10-CM

## 2023-02-23 DIAGNOSIS — G89.4 CHRONIC PAIN SYNDROME: ICD-10-CM

## 2023-02-23 DIAGNOSIS — M54.2 NECK PAIN, ACUTE: ICD-10-CM

## 2023-02-25 RX ORDER — HYDROCODONE BITARTRATE AND ACETAMINOPHEN 10; 325 MG/1; MG/1
1 TABLET ORAL EVERY 8 HOURS PRN
Qty: 90 TABLET | Refills: 0 | Status: SHIPPED | OUTPATIENT
Start: 2023-02-25 | End: 2023-03-30 | Stop reason: SDUPTHER

## 2023-03-23 ENCOUNTER — LAB VISIT (OUTPATIENT)
Dept: LAB | Facility: CLINIC | Age: 58
End: 2023-03-23
Payer: COMMERCIAL

## 2023-03-23 DIAGNOSIS — M06.00 SERONEGATIVE RHEUMATOID ARTHRITIS: ICD-10-CM

## 2023-03-23 LAB
ALBUMIN SERPL BCP-MCNC: 4.1 G/DL (ref 3.5–5.2)
ALP SERPL-CCNC: 69 U/L (ref 55–135)
ALT SERPL W/O P-5'-P-CCNC: 17 U/L (ref 10–44)
ANION GAP SERPL CALC-SCNC: 10 MMOL/L (ref 8–16)
AST SERPL-CCNC: 17 U/L (ref 10–40)
BASOPHILS # BLD AUTO: 0.03 K/UL (ref 0–0.2)
BASOPHILS NFR BLD: 0.6 % (ref 0–1.9)
BILIRUB SERPL-MCNC: 0.6 MG/DL (ref 0.1–1)
BUN SERPL-MCNC: 15 MG/DL (ref 6–20)
CALCIUM SERPL-MCNC: 10 MG/DL (ref 8.7–10.5)
CHLORIDE SERPL-SCNC: 101 MMOL/L (ref 95–110)
CO2 SERPL-SCNC: 29 MMOL/L (ref 23–29)
CREAT SERPL-MCNC: 0.9 MG/DL (ref 0.5–1.4)
CRP SERPL-MCNC: 7.4 MG/L (ref 0–8.2)
DIFFERENTIAL METHOD: ABNORMAL
EOSINOPHIL # BLD AUTO: 0.1 K/UL (ref 0–0.5)
EOSINOPHIL NFR BLD: 1.5 % (ref 0–8)
ERYTHROCYTE [DISTWIDTH] IN BLOOD BY AUTOMATED COUNT: 13.5 % (ref 11.5–14.5)
ERYTHROCYTE [SEDIMENTATION RATE] IN BLOOD BY WESTERGREN METHOD: 30 MM/HR (ref 0–20)
EST. GFR  (NO RACE VARIABLE): >60 ML/MIN/1.73 M^2
GLUCOSE SERPL-MCNC: 111 MG/DL (ref 70–110)
HCT VFR BLD AUTO: 41.1 % (ref 37–48.5)
HGB BLD-MCNC: 13 G/DL (ref 12–16)
IMM GRANULOCYTES # BLD AUTO: 0.01 K/UL (ref 0–0.04)
IMM GRANULOCYTES NFR BLD AUTO: 0.2 % (ref 0–0.5)
LYMPHOCYTES # BLD AUTO: 2 K/UL (ref 1–4.8)
LYMPHOCYTES NFR BLD: 38.5 % (ref 18–48)
MCH RBC QN AUTO: 28.4 PG (ref 27–31)
MCHC RBC AUTO-ENTMCNC: 31.6 G/DL (ref 32–36)
MCV RBC AUTO: 90 FL (ref 82–98)
MONOCYTES # BLD AUTO: 0.3 K/UL (ref 0.3–1)
MONOCYTES NFR BLD: 5.3 % (ref 4–15)
NEUTROPHILS # BLD AUTO: 2.8 K/UL (ref 1.8–7.7)
NEUTROPHILS NFR BLD: 53.9 % (ref 38–73)
NRBC BLD-RTO: 0 /100 WBC
PLATELET # BLD AUTO: 323 K/UL (ref 150–450)
PMV BLD AUTO: 11.1 FL (ref 9.2–12.9)
POTASSIUM SERPL-SCNC: 4.6 MMOL/L (ref 3.5–5.1)
PROT SERPL-MCNC: 7.3 G/DL (ref 6–8.4)
RBC # BLD AUTO: 4.58 M/UL (ref 4–5.4)
SODIUM SERPL-SCNC: 140 MMOL/L (ref 136–145)
WBC # BLD AUTO: 5.24 K/UL (ref 3.9–12.7)

## 2023-03-23 PROCEDURE — 80053 COMPREHEN METABOLIC PANEL: CPT | Performed by: PHYSICIAN ASSISTANT

## 2023-03-23 PROCEDURE — 85025 COMPLETE CBC W/AUTO DIFF WBC: CPT | Performed by: PHYSICIAN ASSISTANT

## 2023-03-23 PROCEDURE — 85651 RBC SED RATE NONAUTOMATED: CPT | Performed by: PHYSICIAN ASSISTANT

## 2023-03-23 PROCEDURE — 86140 C-REACTIVE PROTEIN: CPT | Performed by: PHYSICIAN ASSISTANT

## 2023-03-30 ENCOUNTER — OFFICE VISIT (OUTPATIENT)
Dept: RHEUMATOLOGY | Facility: CLINIC | Age: 58
End: 2023-03-30
Payer: COMMERCIAL

## 2023-03-30 VITALS
DIASTOLIC BLOOD PRESSURE: 83 MMHG | HEIGHT: 64 IN | HEART RATE: 62 BPM | WEIGHT: 190.94 LBS | SYSTOLIC BLOOD PRESSURE: 120 MMHG | BODY MASS INDEX: 32.6 KG/M2

## 2023-03-30 DIAGNOSIS — M54.2 NECK PAIN, ACUTE: ICD-10-CM

## 2023-03-30 DIAGNOSIS — Z51.81 ENCOUNTER FOR MONITORING SULFASALAZINE THERAPY: ICD-10-CM

## 2023-03-30 DIAGNOSIS — M06.00 SERONEGATIVE RHEUMATOID ARTHRITIS: ICD-10-CM

## 2023-03-30 DIAGNOSIS — T78.40XS ALLERGY, SEQUELA: Primary | ICD-10-CM

## 2023-03-30 DIAGNOSIS — G89.4 CHRONIC PAIN SYNDROME: ICD-10-CM

## 2023-03-30 DIAGNOSIS — Z79.899 ENCOUNTER FOR MONITORING SULFASALAZINE THERAPY: ICD-10-CM

## 2023-03-30 DIAGNOSIS — G47.00 INSOMNIA, UNSPECIFIED TYPE: ICD-10-CM

## 2023-03-30 DIAGNOSIS — D84.821 IMMUNOCOMPROMISED STATE DUE TO DRUG THERAPY: ICD-10-CM

## 2023-03-30 DIAGNOSIS — Z79.899 IMMUNOCOMPROMISED STATE DUE TO DRUG THERAPY: ICD-10-CM

## 2023-03-30 PROCEDURE — 3008F BODY MASS INDEX DOCD: CPT | Mod: CPTII,S$GLB,, | Performed by: INTERNAL MEDICINE

## 2023-03-30 PROCEDURE — 96372 THER/PROPH/DIAG INJ SC/IM: CPT | Mod: S$GLB,,, | Performed by: INTERNAL MEDICINE

## 2023-03-30 PROCEDURE — 1160F RVW MEDS BY RX/DR IN RCRD: CPT | Mod: CPTII,S$GLB,, | Performed by: INTERNAL MEDICINE

## 2023-03-30 PROCEDURE — 1159F PR MEDICATION LIST DOCUMENTED IN MEDICAL RECORD: ICD-10-PCS | Mod: CPTII,S$GLB,, | Performed by: INTERNAL MEDICINE

## 2023-03-30 PROCEDURE — 3079F DIAST BP 80-89 MM HG: CPT | Mod: CPTII,S$GLB,, | Performed by: INTERNAL MEDICINE

## 2023-03-30 PROCEDURE — 3074F PR MOST RECENT SYSTOLIC BLOOD PRESSURE < 130 MM HG: ICD-10-PCS | Mod: CPTII,S$GLB,, | Performed by: INTERNAL MEDICINE

## 2023-03-30 PROCEDURE — 1160F PR REVIEW ALL MEDS BY PRESCRIBER/CLIN PHARMACIST DOCUMENTED: ICD-10-PCS | Mod: CPTII,S$GLB,, | Performed by: INTERNAL MEDICINE

## 2023-03-30 PROCEDURE — 99999 PR PBB SHADOW E&M-EST. PATIENT-LVL IV: ICD-10-PCS | Mod: PBBFAC,,, | Performed by: INTERNAL MEDICINE

## 2023-03-30 PROCEDURE — 99999 PR PBB SHADOW E&M-EST. PATIENT-LVL IV: CPT | Mod: PBBFAC,,, | Performed by: INTERNAL MEDICINE

## 2023-03-30 PROCEDURE — 96372 PR INJECTION,THERAP/PROPH/DIAG2ST, IM OR SUBCUT: ICD-10-PCS | Mod: S$GLB,,, | Performed by: INTERNAL MEDICINE

## 2023-03-30 PROCEDURE — 3008F PR BODY MASS INDEX (BMI) DOCUMENTED: ICD-10-PCS | Mod: CPTII,S$GLB,, | Performed by: INTERNAL MEDICINE

## 2023-03-30 PROCEDURE — 3079F PR MOST RECENT DIASTOLIC BLOOD PRESSURE 80-89 MM HG: ICD-10-PCS | Mod: CPTII,S$GLB,, | Performed by: INTERNAL MEDICINE

## 2023-03-30 PROCEDURE — 99215 OFFICE O/P EST HI 40 MIN: CPT | Mod: 25,S$GLB,, | Performed by: INTERNAL MEDICINE

## 2023-03-30 PROCEDURE — 3074F SYST BP LT 130 MM HG: CPT | Mod: CPTII,S$GLB,, | Performed by: INTERNAL MEDICINE

## 2023-03-30 PROCEDURE — 99215 PR OFFICE/OUTPT VISIT, EST, LEVL V, 40-54 MIN: ICD-10-PCS | Mod: 25,S$GLB,, | Performed by: INTERNAL MEDICINE

## 2023-03-30 PROCEDURE — 1159F MED LIST DOCD IN RCRD: CPT | Mod: CPTII,S$GLB,, | Performed by: INTERNAL MEDICINE

## 2023-03-30 RX ORDER — SULFASALAZINE 500 MG/1
1000 TABLET, DELAYED RELEASE ORAL 2 TIMES DAILY
Qty: 360 TABLET | Refills: 3 | Status: SHIPPED | OUTPATIENT
Start: 2023-03-30 | End: 2024-04-07

## 2023-03-30 RX ORDER — HYDROCODONE BITARTRATE AND ACETAMINOPHEN 10; 325 MG/1; MG/1
1 TABLET ORAL EVERY 8 HOURS PRN
Qty: 90 TABLET | Refills: 0 | Status: SHIPPED | OUTPATIENT
Start: 2023-04-27 | End: 2023-05-27

## 2023-03-30 RX ORDER — LEVOCETIRIZINE DIHYDROCHLORIDE 2.5 MG/5ML
2.5 SOLUTION ORAL NIGHTLY
Qty: 148 ML | Refills: 3 | Status: SHIPPED | OUTPATIENT
Start: 2023-03-30 | End: 2024-03-01

## 2023-03-30 RX ORDER — CYANOCOBALAMIN 1000 UG/ML
1000 INJECTION, SOLUTION INTRAMUSCULAR; SUBCUTANEOUS
Status: COMPLETED | OUTPATIENT
Start: 2023-03-30 | End: 2023-03-30

## 2023-03-30 RX ORDER — MONTELUKAST SODIUM 10 MG/1
10 TABLET ORAL NIGHTLY
Qty: 30 TABLET | Refills: 5 | Status: SHIPPED | OUTPATIENT
Start: 2023-03-30 | End: 2023-04-29

## 2023-03-30 RX ORDER — AZELASTINE 1 MG/ML
1 SPRAY, METERED NASAL 2 TIMES DAILY
Qty: 30 ML | Refills: 3 | Status: SHIPPED | OUTPATIENT
Start: 2023-03-30 | End: 2024-03-01 | Stop reason: SDUPTHER

## 2023-03-30 RX ORDER — GABAPENTIN 600 MG/1
600 TABLET ORAL 3 TIMES DAILY
Qty: 270 TABLET | Refills: 1 | Status: SHIPPED | OUTPATIENT
Start: 2023-03-30 | End: 2023-09-01 | Stop reason: SDUPTHER

## 2023-03-30 RX ORDER — METHOCARBAMOL 500 MG/1
500 TABLET, FILM COATED ORAL 3 TIMES DAILY PRN
Qty: 270 TABLET | Refills: 2 | Status: SHIPPED | OUTPATIENT
Start: 2023-03-30 | End: 2024-03-01

## 2023-03-30 RX ORDER — METHYLPREDNISOLONE ACETATE 80 MG/ML
160 INJECTION, SUSPENSION INTRA-ARTICULAR; INTRALESIONAL; INTRAMUSCULAR; SOFT TISSUE
Status: COMPLETED | OUTPATIENT
Start: 2023-03-30 | End: 2023-03-30

## 2023-03-30 RX ORDER — HYDROCODONE BITARTRATE AND ACETAMINOPHEN 10; 325 MG/1; MG/1
1 TABLET ORAL EVERY 8 HOURS PRN
Qty: 90 TABLET | Refills: 0 | Status: SHIPPED | OUTPATIENT
Start: 2023-05-26 | End: 2023-06-29 | Stop reason: SDUPTHER

## 2023-03-30 RX ORDER — PREDNISONE 2.5 MG/1
2.5 TABLET ORAL DAILY
Qty: 90 TABLET | Refills: 1 | Status: SHIPPED | OUTPATIENT
Start: 2023-03-30 | End: 2023-09-01 | Stop reason: SDUPTHER

## 2023-03-30 RX ORDER — HYDROCODONE BITARTRATE AND ACETAMINOPHEN 10; 325 MG/1; MG/1
1 TABLET ORAL EVERY 8 HOURS PRN
Qty: 90 TABLET | Refills: 0 | Status: SHIPPED | OUTPATIENT
Start: 2023-03-30 | End: 2023-04-29

## 2023-03-30 RX ORDER — KETOROLAC TROMETHAMINE 30 MG/ML
60 INJECTION, SOLUTION INTRAMUSCULAR; INTRAVENOUS
Status: COMPLETED | OUTPATIENT
Start: 2023-03-30 | End: 2023-03-30

## 2023-03-30 RX ADMIN — CYANOCOBALAMIN 1000 MCG: 1000 INJECTION, SOLUTION INTRAMUSCULAR; SUBCUTANEOUS at 03:03

## 2023-03-30 RX ADMIN — KETOROLAC TROMETHAMINE 60 MG: 30 INJECTION, SOLUTION INTRAMUSCULAR; INTRAVENOUS at 03:03

## 2023-03-30 RX ADMIN — METHYLPREDNISOLONE ACETATE 160 MG: 80 INJECTION, SUSPENSION INTRA-ARTICULAR; INTRALESIONAL; INTRAMUSCULAR; SOFT TISSUE at 03:03

## 2023-03-30 ASSESSMENT — ROUTINE ASSESSMENT OF PATIENT INDEX DATA (RAPID3)
PATIENT GLOBAL ASSESSMENT SCORE: 6
MDHAQ FUNCTION SCORE: 0.5
PAIN SCORE: 6
FATIGUE SCORE: 1.1
TOTAL RAPID3 SCORE: 4.56
PSYCHOLOGICAL DISTRESS SCORE: 0

## 2023-03-30 NOTE — PROGRESS NOTES
Subjective:       Patient ID: Susana Andersen is a 57 y.o. female.    Chief Complaint: Disease Management      Follow up: 57 year old female who presents to clinic for follow up on seronegative rheumatoid arthritis. She has been compliant with SSZ 1000 mg bid. X She is taking prednisone 2.5 mg 2-3 days a week as needed for pain. She  had a uti. Tx 2 weeks ago.She will occasionally take helder at bedtime if having a severe flare. She is taking robaxin and mobic only PRN. She continues to have pain and swelling in her hands R>L and wrist. She has intermittent neck pain, but no radicular sx or weakness.  She is prescribed norco for severe pain, which is helpful.    She reports some difficulty with sleep initiation.     We reviewed her recent labs.     Current tx:  1. SSZ  2. Norco  3. Gabapentin  4. Mobic      Review of Systems   Constitutional:  Positive for activity change. Negative for appetite change and chills.   Eyes:  Negative for visual disturbance.   Respiratory:  Negative for cough and shortness of breath.    Cardiovascular:  Negative for chest pain, palpitations and leg swelling.   Gastrointestinal:  Negative for abdominal pain, constipation, diarrhea, nausea and vomiting.   Musculoskeletal:  Positive for arthralgias.   Allergic/Immunologic: Negative for immunocompromised state.   Neurological:  Negative for dizziness, weakness and light-headedness.       Objective:     Vitals:    03/30/23 1352   BP: 120/83   Pulse: 62       Past Medical History:   Diagnosis Date    Allergy     Arthritis     Colon polyps 07/29/2021    Hypertension      Past Surgical History:   Procedure Laterality Date    COLONOSCOPY N/A 7/29/2021    Procedure: COLONOSCOPY;  Surgeon: Edgar Bolaños MD;  Location: [facility];  Service: Endoscopy;  Laterality: N/A;    COLONOSCOPY W/ BIOPSIES            Physical Exam   Eyes: Right conjunctiva is not injected. Left conjunctiva is not injected. Right eye exhibits normal extraocular motion. Left eye  exhibits normal extraocular motion.   Neck: No JVD present. No thyromegaly present.   Cardiovascular: Normal rate and regular rhythm. Exam reveals no decreased pulses.   Pulmonary/Chest: Effort normal.   Musculoskeletal:      Right shoulder: Normal.      Left shoulder: Normal.      Right elbow: Normal.      Left elbow: Normal.      Right wrist: Swelling and tenderness present.      Left wrist: Swelling and tenderness present.      Right knee: Tenderness present.      Left knee: Tenderness present.   Lymphadenopathy:     She has no cervical adenopathy.   Neurological: Gait normal.   Skin: No rash noted.   Psychiatric: Mood and affect normal.       Right Side Rheumatological Exam     Examination finds the shoulder, elbow, 1st MCP, 4th MCP and 5th MCP normal.    The patient is tender to palpation of the wrist, knee, 1st PIP, 2nd PIP, 2nd MCP, 3rd PIP, 3rd MCP, 4th PIP and 5th PIP    She has swelling of the wrist, 1st PIP, 2nd PIP, 2nd MCP, 3rd PIP, 3rd MCP, 4th PIP and 5th PIP    Left Side Rheumatological Exam     Examination finds the shoulder, elbow, 1st PIP, 1st MCP, 2nd PIP, 2nd MCP, 3rd PIP, 3rd MCP, 4th PIP, 4th MCP, 5th PIP and 5th MCP normal.    The patient is tender to palpation of the wrist and knee.    She has swelling of the wrist            Results for orders placed or performed in visit on 03/23/23   CBC Auto Differential   Result Value Ref Range    WBC 5.24 3.90 - 12.70 K/uL    RBC 4.58 4.00 - 5.40 M/uL    Hemoglobin 13.0 12.0 - 16.0 g/dL    Hematocrit 41.1 37.0 - 48.5 %    MCV 90 82 - 98 fL    MCH 28.4 27.0 - 31.0 pg    MCHC 31.6 (L) 32.0 - 36.0 g/dL    RDW 13.5 11.5 - 14.5 %    Platelets 323 150 - 450 K/uL    MPV 11.1 9.2 - 12.9 fL    Immature Granulocytes 0.2 0.0 - 0.5 %    Gran # (ANC) 2.8 1.8 - 7.7 K/uL    Immature Grans (Abs) 0.01 0.00 - 0.04 K/uL    Lymph # 2.0 1.0 - 4.8 K/uL    Mono # 0.3 0.3 - 1.0 K/uL    Eos # 0.1 0.0 - 0.5 K/uL    Baso # 0.03 0.00 - 0.20 K/uL    nRBC 0 0 /100 WBC    Gran %  53.9 38.0 - 73.0 %    Lymph % 38.5 18.0 - 48.0 %    Mono % 5.3 4.0 - 15.0 %    Eosinophil % 1.5 0.0 - 8.0 %    Basophil % 0.6 0.0 - 1.9 %    Differential Method Automated    Comprehensive Metabolic Panel   Result Value Ref Range    Sodium 140 136 - 145 mmol/L    Potassium 4.6 3.5 - 5.1 mmol/L    Chloride 101 95 - 110 mmol/L    CO2 29 23 - 29 mmol/L    Glucose 111 (H) 70 - 110 mg/dL    BUN 15 6 - 20 mg/dL    Creatinine 0.9 0.5 - 1.4 mg/dL    Calcium 10.0 8.7 - 10.5 mg/dL    Total Protein 7.3 6.0 - 8.4 g/dL    Albumin 4.1 3.5 - 5.2 g/dL    Total Bilirubin 0.6 0.1 - 1.0 mg/dL    Alkaline Phosphatase 69 55 - 135 U/L    AST 17 10 - 40 U/L    ALT 17 10 - 44 U/L    Anion Gap 10 8 - 16 mmol/L    eGFR >60 >60 mL/min/1.73 m^2   C-Reactive Protein   Result Value Ref Range    CRP 7.4 0.0 - 8.2 mg/L   Sedimentation rate   Result Value Ref Range    Sed Rate 30 (H) 0 - 20 mm/Hr     reviewed labs with patient during this visit       Assessment:       1. Allergy, sequela    2. Seronegative rheumatoid arthritis    3. Encounter for monitoring sulfasalazine therapy    4. Chronic pain syndrome    5. Insomnia, unspecified type    6. Immunocompromised state due to drug therapy    7. Neck pain, acute              Plan:       Allergy, sequela  -     montelukast (SINGULAIR) 10 mg tablet; Take 1 tablet (10 mg total) by mouth every evening.  Dispense: 30 tablet; Refill: 5  -     levocetirizine (XYZAL) 2.5 mg/5 mL solution; Take 5 mLs (2.5 mg total) by mouth every evening.  Dispense: 148 mL; Refill: 3  -     azelastine (ASTELIN) 137 mcg (0.1 %) nasal spray; 1 spray (137 mcg total) by Nasal route 2 (two) times daily.  Dispense: 30 mL; Refill: 3  -     ketorolac injection 60 mg  -     methylPREDNISolone acetate injection 160 mg  -     cyanocobalamin injection 1,000 mcg  -     T4, Free; Future; Expected date: 03/30/2023  -     TSH; Future; Expected date: 03/30/2023  -     Vitamin D; Future; Expected date: 03/30/2023  -     Cyclic Citrullinated  Peptide Antibody, IgG; Future; Expected date: 03/30/2023  -     Rheumatoid Factor; Future; Expected date: 03/30/2023  -     Sedimentation rate; Future; Expected date: 03/30/2023  -     C-Reactive Protein; Future; Expected date: 03/30/2023  -     Comprehensive Metabolic Panel; Future; Expected date: 03/30/2023  -     CBC Auto Differential; Future; Expected date: 03/30/2023  -     T3, Free; Future; Expected date: 03/30/2023    Seronegative rheumatoid arthritis  -     ketorolac injection 60 mg  -     methylPREDNISolone acetate injection 160 mg  -     cyanocobalamin injection 1,000 mcg  -     sulfaSALAzine (AZULFIDINE) 500 MG EC tablet; Take 2 tablets (1,000 mg total) by mouth 2 (two) times daily.  Dispense: 360 tablet; Refill: 3  -     predniSONE (DELTASONE) 2.5 MG tablet; Take 1 tablet (2.5 mg total) by mouth once daily.  Dispense: 90 tablet; Refill: 1  -     methocarbamoL (ROBAXIN) 500 MG Tab; Take 1 tablet (500 mg total) by mouth 3 (three) times daily as needed (muscle spasms).  Dispense: 270 tablet; Refill: 2  -     HYDROcodone-acetaminophen (NORCO)  mg per tablet; Take 1 tablet by mouth every 8 (eight) hours as needed for Pain.  Dispense: 90 tablet; Refill: 0  -     HYDROcodone-acetaminophen (NORCO)  mg per tablet; Take 1 tablet by mouth every 8 (eight) hours as needed for Pain.  Dispense: 90 tablet; Refill: 0  -     HYDROcodone-acetaminophen (NORCO)  mg per tablet; Take 1 tablet by mouth every 8 (eight) hours as needed for Pain.  Dispense: 90 tablet; Refill: 0  -     gabapentin (NEURONTIN) 600 MG tablet; Take 1 tablet (600 mg total) by mouth 3 (three) times daily.  Dispense: 270 tablet; Refill: 1  -     T4, Free; Future; Expected date: 03/30/2023  -     TSH; Future; Expected date: 03/30/2023  -     Vitamin D; Future; Expected date: 03/30/2023  -     Cyclic Citrullinated Peptide Antibody, IgG; Future; Expected date: 03/30/2023  -     Rheumatoid Factor; Future; Expected date: 03/30/2023  -      Sedimentation rate; Future; Expected date: 03/30/2023  -     C-Reactive Protein; Future; Expected date: 03/30/2023  -     Comprehensive Metabolic Panel; Future; Expected date: 03/30/2023  -     CBC Auto Differential; Future; Expected date: 03/30/2023  -     T3, Free; Future; Expected date: 03/30/2023    Encounter for monitoring sulfasalazine therapy  -     ketorolac injection 60 mg  -     methylPREDNISolone acetate injection 160 mg  -     cyanocobalamin injection 1,000 mcg  -     T4, Free; Future; Expected date: 03/30/2023  -     TSH; Future; Expected date: 03/30/2023  -     Vitamin D; Future; Expected date: 03/30/2023  -     Cyclic Citrullinated Peptide Antibody, IgG; Future; Expected date: 03/30/2023  -     Rheumatoid Factor; Future; Expected date: 03/30/2023  -     Sedimentation rate; Future; Expected date: 03/30/2023  -     C-Reactive Protein; Future; Expected date: 03/30/2023  -     Comprehensive Metabolic Panel; Future; Expected date: 03/30/2023  -     CBC Auto Differential; Future; Expected date: 03/30/2023  -     T3, Free; Future; Expected date: 03/30/2023    Chronic pain syndrome  -     ketorolac injection 60 mg  -     methylPREDNISolone acetate injection 160 mg  -     cyanocobalamin injection 1,000 mcg  -     HYDROcodone-acetaminophen (NORCO)  mg per tablet; Take 1 tablet by mouth every 8 (eight) hours as needed for Pain.  Dispense: 90 tablet; Refill: 0  -     HYDROcodone-acetaminophen (NORCO)  mg per tablet; Take 1 tablet by mouth every 8 (eight) hours as needed for Pain.  Dispense: 90 tablet; Refill: 0  -     HYDROcodone-acetaminophen (NORCO)  mg per tablet; Take 1 tablet by mouth every 8 (eight) hours as needed for Pain.  Dispense: 90 tablet; Refill: 0  -     gabapentin (NEURONTIN) 600 MG tablet; Take 1 tablet (600 mg total) by mouth 3 (three) times daily.  Dispense: 270 tablet; Refill: 1  -     T4, Free; Future; Expected date: 03/30/2023  -     TSH; Future; Expected date:  03/30/2023  -     Vitamin D; Future; Expected date: 03/30/2023  -     Cyclic Citrullinated Peptide Antibody, IgG; Future; Expected date: 03/30/2023  -     Rheumatoid Factor; Future; Expected date: 03/30/2023  -     Sedimentation rate; Future; Expected date: 03/30/2023  -     C-Reactive Protein; Future; Expected date: 03/30/2023  -     Comprehensive Metabolic Panel; Future; Expected date: 03/30/2023  -     CBC Auto Differential; Future; Expected date: 03/30/2023  -     T3, Free; Future; Expected date: 03/30/2023    Insomnia, unspecified type  -     ketorolac injection 60 mg  -     methylPREDNISolone acetate injection 160 mg  -     cyanocobalamin injection 1,000 mcg  -     T4, Free; Future; Expected date: 03/30/2023  -     TSH; Future; Expected date: 03/30/2023  -     Vitamin D; Future; Expected date: 03/30/2023  -     Cyclic Citrullinated Peptide Antibody, IgG; Future; Expected date: 03/30/2023  -     Rheumatoid Factor; Future; Expected date: 03/30/2023  -     Sedimentation rate; Future; Expected date: 03/30/2023  -     C-Reactive Protein; Future; Expected date: 03/30/2023  -     Comprehensive Metabolic Panel; Future; Expected date: 03/30/2023  -     CBC Auto Differential; Future; Expected date: 03/30/2023  -     T3, Free; Future; Expected date: 03/30/2023    Immunocompromised state due to drug therapy  -     ketorolac injection 60 mg  -     methylPREDNISolone acetate injection 160 mg  -     cyanocobalamin injection 1,000 mcg  -     HYDROcodone-acetaminophen (NORCO)  mg per tablet; Take 1 tablet by mouth every 8 (eight) hours as needed for Pain.  Dispense: 90 tablet; Refill: 0  -     HYDROcodone-acetaminophen (NORCO)  mg per tablet; Take 1 tablet by mouth every 8 (eight) hours as needed for Pain.  Dispense: 90 tablet; Refill: 0  -     HYDROcodone-acetaminophen (NORCO)  mg per tablet; Take 1 tablet by mouth every 8 (eight) hours as needed for Pain.  Dispense: 90 tablet; Refill: 0  -     T4, Free;  Future; Expected date: 03/30/2023  -     TSH; Future; Expected date: 03/30/2023  -     Vitamin D; Future; Expected date: 03/30/2023  -     Cyclic Citrullinated Peptide Antibody, IgG; Future; Expected date: 03/30/2023  -     Rheumatoid Factor; Future; Expected date: 03/30/2023  -     Sedimentation rate; Future; Expected date: 03/30/2023  -     C-Reactive Protein; Future; Expected date: 03/30/2023  -     Comprehensive Metabolic Panel; Future; Expected date: 03/30/2023  -     CBC Auto Differential; Future; Expected date: 03/30/2023  -     T3, Free; Future; Expected date: 03/30/2023    Neck pain, acute  -     HYDROcodone-acetaminophen (NORCO)  mg per tablet; Take 1 tablet by mouth every 8 (eight) hours as needed for Pain.  Dispense: 90 tablet; Refill: 0  -     HYDROcodone-acetaminophen (NORCO)  mg per tablet; Take 1 tablet by mouth every 8 (eight) hours as needed for Pain.  Dispense: 90 tablet; Refill: 0  -     HYDROcodone-acetaminophen (NORCO)  mg per tablet; Take 1 tablet by mouth every 8 (eight) hours as needed for Pain.  Dispense: 90 tablet; Refill: 0  -     gabapentin (NEURONTIN) 600 MG tablet; Take 1 tablet (600 mg total) by mouth 3 (three) times daily.  Dispense: 270 tablet; Refill: 1  -     T4, Free; Future; Expected date: 03/30/2023  -     TSH; Future; Expected date: 03/30/2023  -     Vitamin D; Future; Expected date: 03/30/2023  -     Cyclic Citrullinated Peptide Antibody, IgG; Future; Expected date: 03/30/2023  -     Rheumatoid Factor; Future; Expected date: 03/30/2023  -     Sedimentation rate; Future; Expected date: 03/30/2023  -     C-Reactive Protein; Future; Expected date: 03/30/2023  -     Comprehensive Metabolic Panel; Future; Expected date: 03/30/2023  -     CBC Auto Differential; Future; Expected date: 03/30/2023  -     T3, Free; Future; Expected date: 03/30/2023            Assessment:  57 year old female with  Seronegative RA on SSZ  --osteoarthritis  --chronic pain  syndrome    Plan:  1. toradol 60, depo 160, b12 today  2. Cont SSZ 1000 mg bid  3. Cont robaxin 500 mg tid prn, continue mobic prn  4. Cont. norco prn   I have checked [place] prescription monitoring program site and no unusual or abnormal behavior has occurred pt understand the risk and benefits of taking opioid medications and has decided to continue the medication.     More than 50% of the  40 minute encounter was spent face to face counseling the patient regarding current status and future plan of care as well as side effects  of the medications. All questions were answered to patient's satisfaction also includes  non-face to face time preparing to see the patient (eg, review of tests), Obtaining and/or reviewing separately obtained history, Documenting clinical information in the electronic or other health record, Independently interpreting results

## 2023-04-14 ENCOUNTER — TELEPHONE (OUTPATIENT)
Dept: RHEUMATOLOGY | Facility: CLINIC | Age: 58
End: 2023-04-14
Payer: COMMERCIAL

## 2023-04-14 NOTE — TELEPHONE ENCOUNTER
----- Message from Gracy Winn sent at 4/13/2023 11:22 AM CDT -----  Type: Needs Medical Advice  Who Called:  pt     Best Call Back Number: [phone]  Additional Information: pt is requesting a call back about a shot please advise thank you        
Returned patient call regarding shot. Patient stated she called by mistake confirmed everything is fine nothing needed.  
Detail Level: Simple
Plan: Discussed Spironolactone patient will see pcp on 6/16. Discussed having labs done at next visit.

## 2023-05-10 ENCOUNTER — TELEPHONE (OUTPATIENT)
Dept: CARDIOLOGY | Facility: CLINIC | Age: 58
End: 2023-05-10
Payer: COMMERCIAL

## 2023-05-10 NOTE — TELEPHONE ENCOUNTER
----- Message from Rahul Henson sent at 5/10/2023  1:02 PM CDT -----  Type: Need Medical Advice   Who Called: Patient   Best callback number: [phone]  Additional Information: New patient wants to schedule to see CIERA Ceja  Please call to further assist, Thanks

## 2023-05-10 NOTE — TELEPHONE ENCOUNTER
----- Message from Rahul Henson sent at 5/10/2023  1:02 PM CDT -----  Type: Need Medical Advice   Who Called: Patient   Best callback number: [phone]  Additional Information: New patient wants to schedule to see CIERA Ceja  Please call to further assist, Thanks

## 2023-06-06 ENCOUNTER — OFFICE VISIT (OUTPATIENT)
Dept: CARDIOLOGY | Facility: CLINIC | Age: 58
End: 2023-06-06
Payer: COMMERCIAL

## 2023-06-06 VITALS
OXYGEN SATURATION: 98 % | WEIGHT: 188 LBS | BODY MASS INDEX: 32.1 KG/M2 | SYSTOLIC BLOOD PRESSURE: 122 MMHG | DIASTOLIC BLOOD PRESSURE: 80 MMHG | HEART RATE: 60 BPM | RESPIRATION RATE: 16 BRPM | HEIGHT: 64 IN

## 2023-06-06 DIAGNOSIS — E78.5 DYSLIPIDEMIA: ICD-10-CM

## 2023-06-06 DIAGNOSIS — I10 ESSENTIAL HYPERTENSION: Primary | ICD-10-CM

## 2023-06-06 DIAGNOSIS — R94.31 NONSPECIFIC ABNORMAL ELECTROCARDIOGRAM (ECG) (EKG): ICD-10-CM

## 2023-06-06 DIAGNOSIS — M06.00 SERONEGATIVE RHEUMATOID ARTHRITIS: ICD-10-CM

## 2023-06-06 PROCEDURE — 99204 PR OFFICE/OUTPT VISIT, NEW, LEVL IV, 45-59 MIN: ICD-10-PCS | Mod: S$GLB,,, | Performed by: INTERNAL MEDICINE

## 2023-06-06 PROCEDURE — 3074F PR MOST RECENT SYSTOLIC BLOOD PRESSURE < 130 MM HG: ICD-10-PCS | Mod: CPTII,S$GLB,, | Performed by: INTERNAL MEDICINE

## 2023-06-06 PROCEDURE — 3074F SYST BP LT 130 MM HG: CPT | Mod: CPTII,S$GLB,, | Performed by: INTERNAL MEDICINE

## 2023-06-06 PROCEDURE — 1159F PR MEDICATION LIST DOCUMENTED IN MEDICAL RECORD: ICD-10-PCS | Mod: CPTII,S$GLB,, | Performed by: INTERNAL MEDICINE

## 2023-06-06 PROCEDURE — 93000 EKG 12-LEAD: ICD-10-PCS | Mod: S$GLB,,, | Performed by: INTERNAL MEDICINE

## 2023-06-06 PROCEDURE — 1160F PR REVIEW ALL MEDS BY PRESCRIBER/CLIN PHARMACIST DOCUMENTED: ICD-10-PCS | Mod: CPTII,S$GLB,, | Performed by: INTERNAL MEDICINE

## 2023-06-06 PROCEDURE — 3079F PR MOST RECENT DIASTOLIC BLOOD PRESSURE 80-89 MM HG: ICD-10-PCS | Mod: CPTII,S$GLB,, | Performed by: INTERNAL MEDICINE

## 2023-06-06 PROCEDURE — 93000 ELECTROCARDIOGRAM COMPLETE: CPT | Mod: S$GLB,,, | Performed by: INTERNAL MEDICINE

## 2023-06-06 PROCEDURE — 99999 PR PBB SHADOW E&M-EST. PATIENT-LVL V: CPT | Mod: PBBFAC,,, | Performed by: INTERNAL MEDICINE

## 2023-06-06 PROCEDURE — 1159F MED LIST DOCD IN RCRD: CPT | Mod: CPTII,S$GLB,, | Performed by: INTERNAL MEDICINE

## 2023-06-06 PROCEDURE — 3079F DIAST BP 80-89 MM HG: CPT | Mod: CPTII,S$GLB,, | Performed by: INTERNAL MEDICINE

## 2023-06-06 PROCEDURE — 3008F BODY MASS INDEX DOCD: CPT | Mod: CPTII,S$GLB,, | Performed by: INTERNAL MEDICINE

## 2023-06-06 PROCEDURE — 1160F RVW MEDS BY RX/DR IN RCRD: CPT | Mod: CPTII,S$GLB,, | Performed by: INTERNAL MEDICINE

## 2023-06-06 PROCEDURE — 99999 PR PBB SHADOW E&M-EST. PATIENT-LVL V: ICD-10-PCS | Mod: PBBFAC,,, | Performed by: INTERNAL MEDICINE

## 2023-06-06 PROCEDURE — 99204 OFFICE O/P NEW MOD 45 MIN: CPT | Mod: S$GLB,,, | Performed by: INTERNAL MEDICINE

## 2023-06-06 PROCEDURE — 3008F PR BODY MASS INDEX (BMI) DOCUMENTED: ICD-10-PCS | Mod: CPTII,S$GLB,, | Performed by: INTERNAL MEDICINE

## 2023-06-06 RX ORDER — ROSUVASTATIN CALCIUM 10 MG/1
10 TABLET, COATED ORAL DAILY
Qty: 90 TABLET | Refills: 3 | Status: SHIPPED | OUTPATIENT
Start: 2023-06-06 | End: 2023-09-01

## 2023-06-06 NOTE — ASSESSMENT & PLAN NOTE
Need more aggressive dietary management and re-evaluate more recent laboratory profile.    Her previous labs as follows   Latest Reference Range & Units Most Recent   Cholesterol 120 - 199 mg/dL 205 (H)  6/11/21 09:35   HDL 40 - 75 mg/dL 60  6/11/21 09:35   HDL/Cholesterol Ratio 20.0 - 50.0 % 29.3  6/11/21 09:35   LDL Cholesterol External 63.0 - 159.0 mg/dL 127.4  6/11/21 09:35   Non-HDL Cholesterol mg/dL 145  6/11/21 09:35   Total Cholesterol/HDL Ratio 2.0 - 5.0  3.4  6/11/21 09:35   Triglycerides 30 - 150 mg/dL 88  6/11/21 09:35   (H): Data is abnormally high    Patient would more aggressive wishes to have more aggressive management.  Will try Crestor in an effort to get her to goal at 10 mg daily

## 2023-06-06 NOTE — ASSESSMENT & PLAN NOTE
Her blood pressure is well controlled today at 122/80 mmHg.    She is presently on amlodipine 10 mg daily, hydrochlorothiazide 25 mg daily, furosemide 20 mg p.r.n.   Continue on metoprolol tartrate 25 mg once daily, this may need to be changed to 25 mg half a tablet twice a day.    Encouraged her to continue the same and maintain on strict therapy and add low-salt low-fat diet to her regimen.

## 2023-06-06 NOTE — PROGRESS NOTES
Subjective:    Patient ID:  Susana Andersen is a 58 y.o. female patient here for evaluation Establish Care and Hypertension (She has elevated bp, she wants to make sure she is on correct medications)      History of Present Illness:   58-year-old lady who is seeking evaluation for her blood pressure.  She was diagnosed with hypertension about 25 years ago has been medicated with amlodipine beta-blockers and diuretic therapy.  Lately she has noted some fluctuations in the blood pressure and is seeking to establish cardiovascular care.  Patient denies having chest discomfort no arm neck or jaw pain no significant shortness of breath with normal physical activity noted.  No headaches no visual disturbances no cough or congestion.  No fevers or chills no nausea vomiting no blood in the stools no black stools are noted.  Effort capacity is fairly good and she walks at least twice a day for about 1/2 hour duration each.          Review of patient's allergies indicates:  No Known Allergies    Past Medical History:   Diagnosis Date    Allergy     Arthritis     Colon polyps 07/29/2021    Hypertension      Past Surgical History:   Procedure Laterality Date    COLONOSCOPY N/A 7/29/2021    Procedure: COLONOSCOPY;  Surgeon: Edgar Bolaños MD;  Location: [facility];  Service: Endoscopy;  Laterality: N/A;    COLONOSCOPY W/ BIOPSIES       Social History     Tobacco Use    Smoking status: Never    Smokeless tobacco: Never   Substance Use Topics    Alcohol use: Yes     Alcohol/week: 1.0 standard drink     Types: 1 Glasses of wine per week    Drug use: Never        Review of Systems   As noted in HPI in addition     Constitutional: Negative for chills, fatigue and fever.   Eyes: No double vision, No blurred vision  Neuro: No headaches, No dizziness  Respiratory: Negative for cough, shortness of breath and wheezing.    Cardiovascular: Negative for chest pain. Negative for palpitations and leg swelling.   Gastrointestinal: Negative for  abdominal pain, No melena, diarrhea, nausea and vomiting.   Genitourinary: Negative for dysuria and frequency, Negative for hematuria  Skin: Negative for bruising, Negative for edema or discoloration noted.   Endocrine: Negative for polyphagia, Negative for heat intolerance, Negative for cold intolerance  Psychiatric: Negative for depression, Negative for anxiety, Negative for memory loss  Musculoskeletal: Negative for neck pain, Negative for muscle weakness, Negative for back pain          Objective        Vitals:    06/06/23 1152   BP: 122/80   Pulse: 60   Resp: 16       LIPIDS - LAST 2   Lab Results   Component Value Date    CHOL 205 (H) 06/11/2021    CHOL 216 (H) 04/03/2004    HDL 60 06/11/2021    HDL 51.0 04/03/2004    LDLCALC 127.4 06/11/2021    LDLCALC 155.8 (H) 04/03/2004    TRIG 88 06/11/2021    TRIG 46 04/03/2004    CHOLHDL 29.3 06/11/2021    CHOLHDL 23.6 04/03/2004       CBC - LAST 2  Lab Results   Component Value Date    WBC 5.24 03/23/2023    WBC 6.80 10/20/2022    RBC 4.58 03/23/2023    RBC 4.43 10/20/2022    HGB 13.0 03/23/2023    HGB 12.6 10/20/2022    HCT 41.1 03/23/2023    HCT 39.0 10/20/2022    MCV 90 03/23/2023    MCV 88 10/20/2022    MCH 28.4 03/23/2023    MCH 28.4 10/20/2022    MCHC 31.6 (L) 03/23/2023    MCHC 32.3 10/20/2022    RDW 13.5 03/23/2023    RDW 14.4 10/20/2022     03/23/2023     10/20/2022    MPV 11.1 03/23/2023    MPV 11.4 10/20/2022    GRAN 2.8 03/23/2023    GRAN 53.9 03/23/2023    LYMPH 2.0 03/23/2023    LYMPH 38.5 03/23/2023    MONO 0.3 03/23/2023    MONO 5.3 03/23/2023    BASO 0.03 03/23/2023    BASO 0.02 10/20/2022    NRBC 0 03/23/2023    NRBC 0 10/20/2022       CHEMISTRY & LIVER FUNCTION - LAST 2  Lab Results   Component Value Date     03/23/2023     06/16/2022    K 4.6 03/23/2023    K 4.4 06/16/2022     03/23/2023     06/16/2022    CO2 29 03/23/2023    CO2 32 (H) 06/16/2022    ANIONGAP 10 03/23/2023    ANIONGAP 9 06/16/2022    BUN 15  03/23/2023    BUN 14 06/16/2022    CREATININE 0.9 03/23/2023    CREATININE 0.8 06/16/2022     (H) 03/23/2023     06/16/2022    CALCIUM 10.0 03/23/2023    CALCIUM 10.1 06/16/2022    ALBUMIN 4.1 03/23/2023    ALBUMIN 4.1 06/16/2022    PROT 7.3 03/23/2023    PROT 7.5 06/16/2022    ALKPHOS 69 03/23/2023    ALKPHOS 75 06/16/2022    ALT 17 03/23/2023    ALT 18 06/16/2022    AST 17 03/23/2023    AST 18 06/16/2022    BILITOT 0.6 03/23/2023    BILITOT 0.6 06/16/2022        CARDIAC PROFILE - LAST 2  No results found for: BNP, CPK, CPKMB, LDH, TROPONINI, TROPONINIHS     COAGULATION - LAST 2  No results found for: LABPT, INR, APTT    ENDOCRINE & PSA - LAST 2  Lab Results   Component Value Date    TSH 1.720 10/20/2022    TSH 0.787 09/10/2020        ECHOCARDIOGRAM RESULTS  No results found for this or any previous visit.      CURRENT/PREVIOUS VISIT EKG  Results for orders placed or performed during the hospital encounter of 07/27/21   EKG 12-lead    Collection Time: 07/27/21  8:12 AM    Narrative    Test Reason : Z01.818,Z01.818,    Vent. Rate : 061 BPM     Atrial Rate : 061 BPM     P-R Int : 160 ms          QRS Dur : 090 ms      QT Int : 436 ms       P-R-T Axes : 024 007 -06 degrees     QTc Int : 438 ms    Normal sinus rhythm  T wave abnormality, consider anterior ischemia  Abnormal ECG  No previous ECGs available  Confirmed by Junior Christianson MD ([id number]) on 8/2/2021 3:10:05 PM    Referred By:  LIN           Confirmed By:Junior Christianson MD     No valid procedures specified.   No results found for this or any previous visit.    No valid procedures specified.    06/06/2023 EKG shows sinus bradycardia rate of 58 beats per minute voltage criteria for LVH and ST T wave abnormalities consistent with anterior ischemia noted.  This is not significantly changed from baseline EKG compared to July 2021      PREVIOUS STRESS TEST              PREVIOUS ANGIOGRAM        PHYSICAL EXAM    GENERAL: well built, well nourished,  well-developed in no apparent distress alert and oriented.   HEENT: Normocephalic. Pupils normal and conjunctivae normal.  Mucous membranes normal, no cyanosis or icterus, trachea central,no pallor or icterus is noted..   NECK: No JVD. No bruit..   THYROID: Thyroid not enlarged. No nodules present..   CARDIAC: Regular rate and rhythm. S1 is normal.S2 is normal.No gallops, clicks or murmurs noted at this time.  CHEST ANATOMY: normal.   LUNGS: Clear to auscultation. No wheezing or rhonchi..   ABDOMEN: Soft no masses or organomegaly.  No abdomen pulsations or bruits.  Normal bowel sounds. No pulsations and no masses felt, No guarding or rebound.   EXTREMITIES: No cyanosis, clubbing or edema noted at this time., no calf tenderness bilaterally.   PERIPHERAL VASCULAR SYSTEM: Good palpable distal pulses.   CENTRAL NERVOUS SYSTEM: No focal motor or sensory deficits noted.   SKIN: Skin without lesions, moist, well perfused.   MUSCLE STRENGTH & TONE: No noteable weakness, atrophy or abnormal movement.     I HAVE REVIEWED :    The vital signs, nurses notes, and all the pertinent radiology and labs.        Current Outpatient Medications   Medication Instructions    amitriptyline (ELAVIL) 10 mg, Oral, Nightly PRN    amLODIPine (NORVASC) 10 mg, Oral, Daily    atorvastatin (LIPITOR) 20 MG tablet TAKE 1 TABLET(20 MG) BY MOUTH DAILY    azelastine (ASTELIN) 137 mcg, Nasal, 2 times daily    cetirizine (ZYRTEC) 10 mg, Oral, Daily    ciclopirox (PENLAC) 8 % Soln ciclopirox 8 % topical solution    diclofenac sodium (VOLTAREN) 1 % Gel Topical    furosemide (LASIX) 20 mg, Oral, As needed (PRN)    gabapentin (NEURONTIN) 600 mg, Oral, 3 times daily    hydroCHLOROthiazide (HYDRODIURIL) 25 MG tablet No dose, route, or frequency recorded.    HYDROcodone-acetaminophen (NORCO)  mg per tablet 1 tablet, Oral, Every 8 hours PRN    lactobacillus acidophilus & bulgar (LACTINEX) 100 million cell packet 1 tablet, Oral, 2 times daily     levocetirizine (XYZAL) 2.5 mg, Oral, Nightly    methocarbamoL (ROBAXIN) 500 mg, Oral, 3 times daily PRN    metoprolol tartrate (LOPRESSOR) 25 mg, Oral, Daily    mupirocin (BACTROBAN) 2 % ointment NABILA EXT AA BID    omega-3 fatty acids/fish oil (FISH OIL-OMEGA-3 FATTY ACIDS) 300-1,000 mg capsule Oral, Daily    predniSONE (DELTASONE) 2.5 mg, Oral, Daily    RESTASIS 0.05 % ophthalmic emulsion 1 drop, Both Eyes, 2 times daily    sulfaSALAzine (AZULFIDINE) 1,000 mg, Oral, 2 times daily    vitamin D (VITAMIN D3) 1,000 Units, Oral, Daily          Assessment & Plan     Essential hypertension  Her blood pressure is well controlled today at 122/80 mmHg.    She is presently on amlodipine 10 mg daily, hydrochlorothiazide 25 mg daily, furosemide 20 mg p.r.n.   Continue on metoprolol tartrate 25 mg once daily, this may need to be changed to 25 mg half a tablet twice a day.    Encouraged her to continue the same and maintain on strict therapy and add low-salt low-fat diet to her regimen.    Dyslipidemia  Need more aggressive dietary management and re-evaluate more recent laboratory profile.    Her previous labs as follows   Latest Reference Range & Units Most Recent   Cholesterol 120 - 199 mg/dL 205 (H)  6/11/21 09:35   HDL 40 - 75 mg/dL 60  6/11/21 09:35   HDL/Cholesterol Ratio 20.0 - 50.0 % 29.3  6/11/21 09:35   LDL Cholesterol External 63.0 - 159.0 mg/dL 127.4  6/11/21 09:35   Non-HDL Cholesterol mg/dL 145  6/11/21 09:35   Total Cholesterol/HDL Ratio 2.0 - 5.0  3.4  6/11/21 09:35   Triglycerides 30 - 150 mg/dL 88  6/11/21 09:35   (H): Data is abnormally high      Seronegative rheumatoid arthritis  Follow-up with Rheumatology    Nonspecific abnormal electrocardiogram (ECG) (EKG)  She is abnormal EKG with T-wave abnormalities in the anterior leads.  Recommend to obtain a Lexiscan Myoview to evaluate for any evidence of coronary insufficiency contributing to symptoms also recommend to obtain echocardiogram as she has  electrocardiographic evidence of left from hypertrophy and long history of arterial hypertension          No follow-ups on file.

## 2023-06-06 NOTE — ASSESSMENT & PLAN NOTE
She is abnormal EKG with T-wave abnormalities in the anterior leads.  Recommend to obtain a Lexiscan Myoview to evaluate for any evidence of coronary insufficiency contributing to symptoms also recommend to obtain echocardiogram as she has electrocardiographic evidence of left from hypertrophy and long history of arterial hypertension

## 2023-06-29 DIAGNOSIS — G89.4 CHRONIC PAIN SYNDROME: ICD-10-CM

## 2023-06-29 DIAGNOSIS — M06.00 SERONEGATIVE RHEUMATOID ARTHRITIS: ICD-10-CM

## 2023-06-29 DIAGNOSIS — Z79.899 IMMUNOCOMPROMISED STATE DUE TO DRUG THERAPY: ICD-10-CM

## 2023-06-29 DIAGNOSIS — D84.821 IMMUNOCOMPROMISED STATE DUE TO DRUG THERAPY: ICD-10-CM

## 2023-06-29 DIAGNOSIS — M54.2 NECK PAIN, ACUTE: ICD-10-CM

## 2023-06-29 RX ORDER — HYDROCODONE BITARTRATE AND ACETAMINOPHEN 10; 325 MG/1; MG/1
1 TABLET ORAL EVERY 8 HOURS PRN
Qty: 90 TABLET | Refills: 0 | Status: SHIPPED | OUTPATIENT
Start: 2023-06-29 | End: 2023-07-25 | Stop reason: SDUPTHER

## 2023-07-21 ENCOUNTER — TELEPHONE (OUTPATIENT)
Dept: CARDIOLOGY | Facility: HOSPITAL | Age: 58
End: 2023-07-21

## 2023-07-21 NOTE — TELEPHONE ENCOUNTER
Patient advised, test will be at [facility] [facility]   Will need to register on the first floor at the main entrance.   Patient advised that arrival time is 6:20am.  Patient advised that she may be here about 3.5-4 hours, and may want to bring something to occupy their time, as there will be periods of waiting.    Patient advised, may take her medications prior to testing if you need to.   Advised if she needs to eat to take her medications, please keep it light, like toast and juice.    Patient advised to avoid all caffeine 12 hours prior to testing.  This includes decaf tea and coffee.    Will provide peanut butter crackers for a snack after stress test.  If patient would prefer something else, please bring a snack from home.    Wear comfortable clothing.   No lotions, oils, or powders to the upper chest area. May wear deodorant.    No metal jewelry, buttons, or zippers to the upper body.  Patient verbalizes understanding of instructions.

## 2023-07-24 ENCOUNTER — HOSPITAL ENCOUNTER (OUTPATIENT)
Dept: RADIOLOGY | Facility: HOSPITAL | Age: 58
Discharge: HOME | End: 2023-07-24
Attending: INTERNAL MEDICINE
Payer: COMMERCIAL

## 2023-07-24 ENCOUNTER — HOSPITAL ENCOUNTER (OUTPATIENT)
Dept: CARDIOLOGY | Facility: HOSPITAL | Age: 58
Discharge: HOME | End: 2023-07-24
Attending: INTERNAL MEDICINE
Payer: COMMERCIAL

## 2023-07-24 VITALS — HEIGHT: 64 IN | BODY MASS INDEX: 32.1 KG/M2 | WEIGHT: 188 LBS

## 2023-07-24 DIAGNOSIS — I10 ESSENTIAL HYPERTENSION: ICD-10-CM

## 2023-07-24 DIAGNOSIS — R94.31 NONSPECIFIC ABNORMAL ELECTROCARDIOGRAM (ECG) (EKG): ICD-10-CM

## 2023-07-24 DIAGNOSIS — E78.5 DYSLIPIDEMIA: ICD-10-CM

## 2023-07-24 PROCEDURE — 93018 NUCLEAR STRESS - CARDIOLOGY INTERPRETED (CUPID ONLY): ICD-10-PCS | Mod: ,,, | Performed by: INTERNAL MEDICINE

## 2023-07-24 PROCEDURE — 93016 CV STRESS TEST SUPVJ ONLY: CPT | Mod: ,,,

## 2023-07-24 PROCEDURE — 93306 TTE W/DOPPLER COMPLETE: CPT

## 2023-07-24 PROCEDURE — 93018 CV STRESS TEST I&R ONLY: CPT | Mod: ,,, | Performed by: INTERNAL MEDICINE

## 2023-07-24 PROCEDURE — 78452 NUCLEAR STRESS - CARDIOLOGY INTERPRETED (CUPID ONLY): ICD-10-PCS | Mod: 26,,, | Performed by: INTERNAL MEDICINE

## 2023-07-24 PROCEDURE — A9502 TC99M TETROFOSMIN: HCPCS

## 2023-07-24 PROCEDURE — 93306 ECHO (CUPID ONLY): ICD-10-PCS | Mod: 26,,, | Performed by: SPECIALIST

## 2023-07-24 PROCEDURE — 93016 NUCLEAR STRESS - CARDIOLOGY INTERPRETED (CUPID ONLY): ICD-10-PCS | Mod: ,,,

## 2023-07-24 PROCEDURE — 93306 TTE W/DOPPLER COMPLETE: CPT | Mod: 26,,, | Performed by: SPECIALIST

## 2023-07-24 PROCEDURE — 78452 HT MUSCLE IMAGE SPECT MULT: CPT

## 2023-07-24 PROCEDURE — 78452 HT MUSCLE IMAGE SPECT MULT: CPT | Mod: 26,,, | Performed by: INTERNAL MEDICINE

## 2023-07-24 RX ORDER — REGADENOSON 0.08 MG/ML
0.4 INJECTION, SOLUTION INTRAVENOUS ONCE
Status: COMPLETED | OUTPATIENT
Start: 2023-07-24 | End: 2023-07-24

## 2023-07-24 RX ADMIN — REGADENOSON 0.4 MG: 0.08 INJECTION, SOLUTION INTRAVENOUS at 08:07

## 2023-07-25 DIAGNOSIS — M54.2 NECK PAIN, ACUTE: ICD-10-CM

## 2023-07-25 DIAGNOSIS — Z79.899 IMMUNOCOMPROMISED STATE DUE TO DRUG THERAPY: ICD-10-CM

## 2023-07-25 DIAGNOSIS — D84.821 IMMUNOCOMPROMISED STATE DUE TO DRUG THERAPY: ICD-10-CM

## 2023-07-25 DIAGNOSIS — M06.00 SERONEGATIVE RHEUMATOID ARTHRITIS: ICD-10-CM

## 2023-07-25 DIAGNOSIS — G89.4 CHRONIC PAIN SYNDROME: ICD-10-CM

## 2023-07-29 LAB
CV PHARM DOSE: 0.4 MG
CV STRESS BASE HR: 55 BPM
DIASTOLIC BLOOD PRESSURE: 78 MMHG
EJECTION FRACTION- HIGH: 65 %
END DIASTOLIC INDEX-HIGH: 153 ML/M2
END DIASTOLIC INDEX-LOW: 93 ML/M2
END SYSTOLIC INDEX-HIGH: 71 ML/M2
END SYSTOLIC INDEX-LOW: 31 ML/M2
NUC REST DIASTOLIC VOLUME INDEX: 51
NUC REST EJECTION FRACTION: 65
NUC REST SYSTOLIC VOLUME INDEX: 18
NUC STRESS DIASTOLIC VOLUME INDEX: 68
NUC STRESS EJECTION FRACTION: 71 %
NUC STRESS SYSTOLIC VOLUME INDEX: 20
OHS CV CPX 1 MINUTE RECOVERY HEART RATE: 88 BPM
OHS CV CPX 85 PERCENT MAX PREDICTED HEART RATE MALE: 131
OHS CV CPX MAX PREDICTED HEART RATE: 154
OHS CV CPX PATIENT IS FEMALE: 1
OHS CV CPX PATIENT IS MALE: 0
OHS CV CPX PEAK DIASTOLIC BLOOD PRESSURE: 80 MMHG
OHS CV CPX PEAK HEAR RATE: 88 BPM
OHS CV CPX PEAK RATE PRESSURE PRODUCT: NORMAL
OHS CV CPX PEAK SYSTOLIC BLOOD PRESSURE: 124 MMHG
OHS CV CPX PERCENT MAX PREDICTED HEART RATE ACHIEVED: 57
OHS CV CPX RATE PRESSURE PRODUCT PRESENTING: 6710
RETIRED EF AND QEF - SEE NOTES: 53 %
SYSTOLIC BLOOD PRESSURE: 122 MMHG

## 2023-08-01 ENCOUNTER — PATIENT MESSAGE (OUTPATIENT)
Dept: RHEUMATOLOGY | Facility: CLINIC | Age: 58
End: 2023-08-01
Payer: COMMERCIAL

## 2023-08-01 LAB
AORTIC ROOT ANNULUS: 3.2 CM
AORTIC VALVE CUSP SEPERATION: 1.9 CM
AV INDEX (PROSTH): 0.85
AV MEAN GRADIENT: 4 MMHG
AV PEAK GRADIENT: 7 MMHG
AV VALVE AREA BY VELOCITY RATIO: 2.19 CM²
AV VALVE AREA: 2.66 CM²
AV VELOCITY RATIO: 0.7
BSA FOR ECHO PROCEDURE: 1.96 M2
CV ECHO LV RWT: 0.38 CM
DOP CALC AO PEAK VEL: 1.32 M/S
DOP CALC AO VTI: 30.7 CM
DOP CALC LVOT AREA: 3.1 CM2
DOP CALC LVOT DIAMETER: 2 CM
DOP CALC LVOT PEAK VEL: 0.92 M/S
DOP CALC LVOT STROKE VOLUME: 81.64 CM3
DOP CALCLVOT PEAK VEL VTI: 26 CM
E WAVE DECELERATION TIME: 195 MSEC
E/A RATIO: 1.13
E/E' RATIO: 5.7 M/S
ECHO LV POSTERIOR WALL: 0.82 CM (ref 0.6–1.1)
EJECTION FRACTION: 65 %
FRACTIONAL SHORTENING: 35 % (ref 28–44)
INTERVENTRICULAR SEPTUM: 1.1 CM (ref 0.6–1.1)
IVRT: 100 MSEC
LEFT ATRIUM SIZE: 3.5 CM
LEFT INTERNAL DIMENSION IN SYSTOLE: 2.79 CM (ref 2.1–4)
LEFT VENTRICLE DIASTOLIC VOLUME INDEX: 43.51 ML/M2
LEFT VENTRICLE DIASTOLIC VOLUME: 83.1 ML
LEFT VENTRICLE MASS INDEX: 71 G/M2
LEFT VENTRICLE SYSTOLIC VOLUME INDEX: 15.3 ML/M2
LEFT VENTRICLE SYSTOLIC VOLUME: 29.3 ML
LEFT VENTRICULAR INTERNAL DIMENSION IN DIASTOLE: 4.3 CM (ref 3.5–6)
LEFT VENTRICULAR MASS: 134.66 G
LV LATERAL E/E' RATIO: 5.13 M/S
LV SEPTAL E/E' RATIO: 6.42 M/S
LVOT MG: 2 MMHG
LVOT MV: 0.55 CM/S
MV PEAK A VEL: 0.68 M/S
MV PEAK E VEL: 0.77 M/S
MV STENOSIS PRESSURE HALF TIME: 59 MS
MV VALVE AREA P 1/2 METHOD: 3.73 CM2
PISA TR MAX VEL: 2.35 M/S
RA PRESSURE: 3 MMHG
RIGHT VENTRICULAR END-DIASTOLIC DIMENSION: 2.5 CM
TDI LATERAL: 0.15 M/S
TDI SEPTAL: 0.12 M/S
TDI: 0.14 M/S
TR MAX PG: 22 MMHG
Z-SCORE OF LEFT VENTRICULAR DIMENSION IN END DIASTOLE: -2.23
Z-SCORE OF LEFT VENTRICULAR DIMENSION IN END SYSTOLE: -1.35

## 2023-08-01 RX ORDER — HYDROCODONE BITARTRATE AND ACETAMINOPHEN 10; 325 MG/1; MG/1
1 TABLET ORAL EVERY 8 HOURS PRN
Qty: 90 TABLET | Refills: 0 | Status: SHIPPED | OUTPATIENT
Start: 2023-08-01 | End: 2023-09-01 | Stop reason: SDUPTHER

## 2023-08-01 NOTE — TELEPHONE ENCOUNTER
----- Message from Yasmeen Ortiz sent at 7/31/2023  9:18 AM CDT -----  Name of Who is Calling: pt        What is the request in detail: pt would like to receive a call in regard to the following medication. Please assist with this matter. HYDROcodone-acetaminophen (NORCO)  mg per tablet       Can the clinic reply by [facility]:  yes       [facility] Back [facility]:[phone]

## 2023-08-20 ENCOUNTER — PATIENT MESSAGE (OUTPATIENT)
Dept: RHEUMATOLOGY | Facility: CLINIC | Age: 58
End: 2023-08-20
Payer: COMMERCIAL

## 2023-08-24 ENCOUNTER — LAB VISIT (OUTPATIENT)
Dept: LAB | Facility: CLINIC | Age: 58
End: 2023-08-24
Payer: COMMERCIAL

## 2023-08-24 DIAGNOSIS — M06.00 SERONEGATIVE RHEUMATOID ARTHRITIS: ICD-10-CM

## 2023-08-24 DIAGNOSIS — M54.2 NECK PAIN, ACUTE: ICD-10-CM

## 2023-08-24 DIAGNOSIS — G47.00 INSOMNIA, UNSPECIFIED TYPE: ICD-10-CM

## 2023-08-24 DIAGNOSIS — G89.4 CHRONIC PAIN SYNDROME: ICD-10-CM

## 2023-08-24 DIAGNOSIS — Z51.81 ENCOUNTER FOR MONITORING SULFASALAZINE THERAPY: ICD-10-CM

## 2023-08-24 DIAGNOSIS — Z79.899 ENCOUNTER FOR MONITORING SULFASALAZINE THERAPY: ICD-10-CM

## 2023-08-24 DIAGNOSIS — Z79.899 IMMUNOCOMPROMISED STATE DUE TO DRUG THERAPY: ICD-10-CM

## 2023-08-24 DIAGNOSIS — D84.821 IMMUNOCOMPROMISED STATE DUE TO DRUG THERAPY: ICD-10-CM

## 2023-08-24 DIAGNOSIS — T78.40XS ALLERGY, SEQUELA: ICD-10-CM

## 2023-08-24 LAB
25(OH)D3+25(OH)D2 SERPL-MCNC: 26 NG/ML (ref 30–96)
ALBUMIN SERPL BCP-MCNC: 4 G/DL (ref 3.5–5.2)
ALP SERPL-CCNC: 60 U/L (ref 55–135)
ALT SERPL W/O P-5'-P-CCNC: 18 U/L (ref 10–44)
ANION GAP SERPL CALC-SCNC: 9 MMOL/L (ref 8–16)
AST SERPL-CCNC: 20 U/L (ref 10–40)
BASOPHILS # BLD AUTO: 0.03 K/UL (ref 0–0.2)
BASOPHILS NFR BLD: 0.6 % (ref 0–1.9)
BILIRUB SERPL-MCNC: 0.5 MG/DL (ref 0.1–1)
BUN SERPL-MCNC: 15 MG/DL (ref 6–20)
CALCIUM SERPL-MCNC: 10 MG/DL (ref 8.7–10.5)
CCP AB SER IA-ACNC: <0.5 U/ML
CHLORIDE SERPL-SCNC: 103 MMOL/L (ref 95–110)
CO2 SERPL-SCNC: 28 MMOL/L (ref 23–29)
CREAT SERPL-MCNC: 0.8 MG/DL (ref 0.5–1.4)
CRP SERPL-MCNC: 8.1 MG/L (ref 0–8.2)
DIFFERENTIAL METHOD: ABNORMAL
EOSINOPHIL # BLD AUTO: 0.1 K/UL (ref 0–0.5)
EOSINOPHIL NFR BLD: 2.1 % (ref 0–8)
ERYTHROCYTE [DISTWIDTH] IN BLOOD BY AUTOMATED COUNT: 13.6 % (ref 11.5–14.5)
ERYTHROCYTE [SEDIMENTATION RATE] IN BLOOD BY WESTERGREN METHOD: 28 MM/HR (ref 0–20)
EST. GFR  (NO RACE VARIABLE): >60 ML/MIN/1.73 M^2
GLUCOSE SERPL-MCNC: 90 MG/DL (ref 70–110)
HCT VFR BLD AUTO: 37.1 % (ref 37–48.5)
HGB BLD-MCNC: 11.8 G/DL (ref 12–16)
IMM GRANULOCYTES # BLD AUTO: 0.01 K/UL (ref 0–0.04)
IMM GRANULOCYTES NFR BLD AUTO: 0.2 % (ref 0–0.5)
LYMPHOCYTES # BLD AUTO: 2.7 K/UL (ref 1–4.8)
LYMPHOCYTES NFR BLD: 51.1 % (ref 18–48)
MCH RBC QN AUTO: 27.7 PG (ref 27–31)
MCHC RBC AUTO-ENTMCNC: 31.8 G/DL (ref 32–36)
MCV RBC AUTO: 87 FL (ref 82–98)
MONOCYTES # BLD AUTO: 0.4 K/UL (ref 0.3–1)
MONOCYTES NFR BLD: 7.5 % (ref 4–15)
NEUTROPHILS # BLD AUTO: 2 K/UL (ref 1.8–7.7)
NEUTROPHILS NFR BLD: 38.5 % (ref 38–73)
NRBC BLD-RTO: 0 /100 WBC
PLATELET # BLD AUTO: 293 K/UL (ref 150–450)
PMV BLD AUTO: 11.1 FL (ref 9.2–12.9)
POTASSIUM SERPL-SCNC: 3.7 MMOL/L (ref 3.5–5.1)
PROT SERPL-MCNC: 7.1 G/DL (ref 6–8.4)
RBC # BLD AUTO: 4.26 M/UL (ref 4–5.4)
RHEUMATOID FACT SERPL-ACNC: <13 IU/ML (ref 0–15)
SODIUM SERPL-SCNC: 140 MMOL/L (ref 136–145)
T3FREE SERPL-MCNC: 3 PG/ML (ref 2.3–4.2)
T4 FREE SERPL-MCNC: 0.98 NG/DL (ref 0.71–1.51)
TSH SERPL DL<=0.005 MIU/L-ACNC: 1.13 UIU/ML (ref 0.4–4)
WBC # BLD AUTO: 5.3 K/UL (ref 3.9–12.7)

## 2023-08-24 PROCEDURE — 85025 COMPLETE CBC W/AUTO DIFF WBC: CPT | Performed by: INTERNAL MEDICINE

## 2023-08-24 PROCEDURE — 82306 VITAMIN D 25 HYDROXY: CPT | Performed by: INTERNAL MEDICINE

## 2023-08-24 PROCEDURE — 86431 RHEUMATOID FACTOR QUANT: CPT | Performed by: INTERNAL MEDICINE

## 2023-08-24 PROCEDURE — 84443 ASSAY THYROID STIM HORMONE: CPT | Performed by: INTERNAL MEDICINE

## 2023-08-24 PROCEDURE — 85651 RBC SED RATE NONAUTOMATED: CPT | Performed by: INTERNAL MEDICINE

## 2023-08-24 PROCEDURE — 86140 C-REACTIVE PROTEIN: CPT | Performed by: INTERNAL MEDICINE

## 2023-08-24 PROCEDURE — 84439 ASSAY OF FREE THYROXINE: CPT | Performed by: INTERNAL MEDICINE

## 2023-08-24 PROCEDURE — 86200 CCP ANTIBODY: CPT | Performed by: INTERNAL MEDICINE

## 2023-08-24 PROCEDURE — 84481 FREE ASSAY (FT-3): CPT | Performed by: INTERNAL MEDICINE

## 2023-08-24 PROCEDURE — 80053 COMPREHEN METABOLIC PANEL: CPT | Performed by: INTERNAL MEDICINE

## 2023-08-28 NOTE — TELEPHONE ENCOUNTER
----- Message from Jenifer Arteaga sent at 8/28/2023  1:37 PM CDT -----  Contact: self  Type:  RX Refill Request    Who Called:  patient   Refill or New Rx:  new rx  RX Name and Strength:  amLODIPine (NORVASC) 5 MG tablet, hydroCHLOROthiazide (HYDRODIURIL) 25 MG tablet and metoprolol tartrate (LOPRESSOR) 25 MG tablet  How is the patient currently taking it? (ex. 1XDay):  as directed  Is this a 30 day or 90 day RX:  90  Preferred Pharmacy with phone number:    [id number] - [place], [place] - [address] AT [facility] [address] [facility] [address]  [address]  [facility] [address]  Phone: [phone] Fax: [phone]  Local or Mail Order:  local  Ordering Provider:  Dr Dru Palacios Call Back Number:  [phone]  Additional Information:  Thanks she saw him in June but is now out of her meds

## 2023-08-29 RX ORDER — METOPROLOL TARTRATE 25 MG/1
25 TABLET, FILM COATED ORAL DAILY
Qty: 90 TABLET | Refills: 3 | Status: SHIPPED | OUTPATIENT
Start: 2023-08-29 | End: 2023-09-27 | Stop reason: SDUPTHER

## 2023-08-29 RX ORDER — AMLODIPINE BESYLATE 5 MG/1
10 TABLET ORAL DAILY
Qty: 180 TABLET | Refills: 3 | Status: SHIPPED | OUTPATIENT
Start: 2023-08-29 | End: 2023-09-27 | Stop reason: SDUPTHER

## 2023-08-29 RX ORDER — HYDROCHLOROTHIAZIDE 25 MG/1
25 TABLET ORAL DAILY
Qty: 90 TABLET | Refills: 3 | Status: SHIPPED | OUTPATIENT
Start: 2023-08-29 | End: 2023-09-27 | Stop reason: SDUPTHER

## 2023-09-01 ENCOUNTER — OFFICE VISIT (OUTPATIENT)
Dept: RHEUMATOLOGY | Facility: CLINIC | Age: 58
End: 2023-09-01
Payer: COMMERCIAL

## 2023-09-01 VITALS
HEART RATE: 64 BPM | BODY MASS INDEX: 31.66 KG/M2 | DIASTOLIC BLOOD PRESSURE: 84 MMHG | SYSTOLIC BLOOD PRESSURE: 142 MMHG | WEIGHT: 185.44 LBS | HEIGHT: 64 IN

## 2023-09-01 DIAGNOSIS — M06.00 SERONEGATIVE RHEUMATOID ARTHRITIS: Primary | ICD-10-CM

## 2023-09-01 DIAGNOSIS — D84.821 IMMUNOCOMPROMISED STATE DUE TO DRUG THERAPY: ICD-10-CM

## 2023-09-01 DIAGNOSIS — M06.00 SERONEGATIVE RHEUMATOID ARTHRITIS: ICD-10-CM

## 2023-09-01 DIAGNOSIS — G89.4 CHRONIC PAIN SYNDROME: ICD-10-CM

## 2023-09-01 DIAGNOSIS — E55.9 VITAMIN D DEFICIENCY: ICD-10-CM

## 2023-09-01 DIAGNOSIS — M54.2 NECK PAIN, ACUTE: ICD-10-CM

## 2023-09-01 DIAGNOSIS — G47.00 INSOMNIA, UNSPECIFIED TYPE: ICD-10-CM

## 2023-09-01 DIAGNOSIS — Z79.899 IMMUNOCOMPROMISED STATE DUE TO DRUG THERAPY: ICD-10-CM

## 2023-09-01 PROCEDURE — 3077F PR MOST RECENT SYSTOLIC BLOOD PRESSURE >= 140 MM HG: ICD-10-PCS | Mod: CPTII,S$GLB,, | Performed by: PHYSICIAN ASSISTANT

## 2023-09-01 PROCEDURE — 1160F PR REVIEW ALL MEDS BY PRESCRIBER/CLIN PHARMACIST DOCUMENTED: ICD-10-PCS | Mod: CPTII,S$GLB,, | Performed by: PHYSICIAN ASSISTANT

## 2023-09-01 PROCEDURE — 96372 THER/PROPH/DIAG INJ SC/IM: CPT | Mod: S$GLB,,, | Performed by: PHYSICIAN ASSISTANT

## 2023-09-01 PROCEDURE — 3077F SYST BP >= 140 MM HG: CPT | Mod: CPTII,S$GLB,, | Performed by: PHYSICIAN ASSISTANT

## 2023-09-01 PROCEDURE — 3079F DIAST BP 80-89 MM HG: CPT | Mod: CPTII,S$GLB,, | Performed by: PHYSICIAN ASSISTANT

## 2023-09-01 PROCEDURE — 99214 OFFICE O/P EST MOD 30 MIN: CPT | Mod: 25,S$GLB,, | Performed by: PHYSICIAN ASSISTANT

## 2023-09-01 PROCEDURE — 3008F PR BODY MASS INDEX (BMI) DOCUMENTED: ICD-10-PCS | Mod: CPTII,S$GLB,, | Performed by: PHYSICIAN ASSISTANT

## 2023-09-01 PROCEDURE — 99999 PR PBB SHADOW E&M-EST. PATIENT-LVL V: CPT | Mod: PBBFAC,,, | Performed by: PHYSICIAN ASSISTANT

## 2023-09-01 PROCEDURE — 1159F MED LIST DOCD IN RCRD: CPT | Mod: CPTII,S$GLB,, | Performed by: PHYSICIAN ASSISTANT

## 2023-09-01 PROCEDURE — 99999 PR PBB SHADOW E&M-EST. PATIENT-LVL V: ICD-10-PCS | Mod: PBBFAC,,, | Performed by: PHYSICIAN ASSISTANT

## 2023-09-01 PROCEDURE — 3079F PR MOST RECENT DIASTOLIC BLOOD PRESSURE 80-89 MM HG: ICD-10-PCS | Mod: CPTII,S$GLB,, | Performed by: PHYSICIAN ASSISTANT

## 2023-09-01 PROCEDURE — 1160F RVW MEDS BY RX/DR IN RCRD: CPT | Mod: CPTII,S$GLB,, | Performed by: PHYSICIAN ASSISTANT

## 2023-09-01 PROCEDURE — 1159F PR MEDICATION LIST DOCUMENTED IN MEDICAL RECORD: ICD-10-PCS | Mod: CPTII,S$GLB,, | Performed by: PHYSICIAN ASSISTANT

## 2023-09-01 PROCEDURE — 3008F BODY MASS INDEX DOCD: CPT | Mod: CPTII,S$GLB,, | Performed by: PHYSICIAN ASSISTANT

## 2023-09-01 PROCEDURE — 96372 PR INJECTION,THERAP/PROPH/DIAG2ST, IM OR SUBCUT: ICD-10-PCS | Mod: S$GLB,,, | Performed by: PHYSICIAN ASSISTANT

## 2023-09-01 PROCEDURE — 99214 PR OFFICE/OUTPT VISIT, EST, LEVL IV, 30-39 MIN: ICD-10-PCS | Mod: 25,S$GLB,, | Performed by: PHYSICIAN ASSISTANT

## 2023-09-01 RX ORDER — LIFITEGRAST 50 MG/ML
SOLUTION/ DROPS OPHTHALMIC
COMMUNITY
End: 2023-12-01 | Stop reason: SDUPTHER

## 2023-09-01 RX ORDER — AMITRIPTYLINE HYDROCHLORIDE 10 MG/1
10 TABLET, FILM COATED ORAL NIGHTLY PRN
Qty: 30 TABLET | Refills: 5 | Status: SHIPPED | OUTPATIENT
Start: 2023-09-01 | End: 2024-08-31

## 2023-09-01 RX ORDER — FLUTICASONE PROPIONATE 50 MCG
2 SPRAY, SUSPENSION (ML) NASAL
COMMUNITY
Start: 2022-12-28 | End: 2024-04-03

## 2023-09-01 RX ORDER — CYANOCOBALAMIN 1000 UG/ML
1000 INJECTION, SOLUTION INTRAMUSCULAR; SUBCUTANEOUS
Status: COMPLETED | OUTPATIENT
Start: 2023-09-01 | End: 2023-09-01

## 2023-09-01 RX ORDER — HYDROCODONE BITARTRATE AND ACETAMINOPHEN 10; 325 MG/1; MG/1
1 TABLET ORAL EVERY 8 HOURS PRN
Qty: 90 TABLET | Refills: 0 | Status: SHIPPED | OUTPATIENT
Start: 2023-10-02 | End: 2023-11-03

## 2023-09-01 RX ORDER — GABAPENTIN 600 MG/1
600 TABLET ORAL 3 TIMES DAILY
Qty: 270 TABLET | Refills: 1 | Status: SHIPPED | OUTPATIENT
Start: 2023-09-01 | End: 2024-08-31

## 2023-09-01 RX ORDER — PREDNISONE 2.5 MG/1
2.5 TABLET ORAL DAILY
Qty: 90 TABLET | Refills: 1 | Status: SHIPPED | OUTPATIENT
Start: 2023-09-01 | End: 2024-03-01 | Stop reason: SDUPTHER

## 2023-09-01 RX ORDER — ATORVASTATIN CALCIUM 20 MG/1
20 TABLET, FILM COATED ORAL
COMMUNITY
Start: 2023-08-25 | End: 2023-09-27 | Stop reason: SDUPTHER

## 2023-09-01 RX ORDER — KETOROLAC TROMETHAMINE 30 MG/ML
60 INJECTION, SOLUTION INTRAMUSCULAR; INTRAVENOUS
Status: COMPLETED | OUTPATIENT
Start: 2023-09-01 | End: 2023-09-01

## 2023-09-01 RX ORDER — HYDROCODONE BITARTRATE AND ACETAMINOPHEN 10; 325 MG/1; MG/1
1 TABLET ORAL EVERY 8 HOURS PRN
Qty: 90 TABLET | Refills: 0 | Status: SHIPPED | OUTPATIENT
Start: 2023-09-01 | End: 2023-10-05

## 2023-09-01 RX ORDER — METHYLPREDNISOLONE ACETATE 80 MG/ML
160 INJECTION, SUSPENSION INTRA-ARTICULAR; INTRALESIONAL; INTRAMUSCULAR; SOFT TISSUE
Status: COMPLETED | OUTPATIENT
Start: 2023-09-01 | End: 2023-09-01

## 2023-09-01 RX ORDER — ERGOCALCIFEROL 1.25 MG/1
50000 CAPSULE ORAL
Qty: 12 CAPSULE | Refills: 1 | Status: SHIPPED | OUTPATIENT
Start: 2023-09-01 | End: 2024-03-01

## 2023-09-01 RX ORDER — HYDROCODONE BITARTRATE AND ACETAMINOPHEN 10; 325 MG/1; MG/1
1 TABLET ORAL EVERY 8 HOURS PRN
Qty: 90 TABLET | Refills: 0 | Status: SHIPPED | OUTPATIENT
Start: 2023-11-01 | End: 2023-11-30 | Stop reason: SDUPTHER

## 2023-09-01 RX ADMIN — CYANOCOBALAMIN 1000 MCG: 1000 INJECTION, SOLUTION INTRAMUSCULAR; SUBCUTANEOUS at 03:09

## 2023-09-01 RX ADMIN — METHYLPREDNISOLONE ACETATE 160 MG: 80 INJECTION, SUSPENSION INTRA-ARTICULAR; INTRALESIONAL; INTRAMUSCULAR; SOFT TISSUE at 03:09

## 2023-09-01 RX ADMIN — KETOROLAC TROMETHAMINE 60 MG: 30 INJECTION, SOLUTION INTRAMUSCULAR; INTRAVENOUS at 03:09

## 2023-09-01 ASSESSMENT — ROUTINE ASSESSMENT OF PATIENT INDEX DATA (RAPID3)
PSYCHOLOGICAL DISTRESS SCORE: 1.1
TOTAL RAPID3 SCORE: 6.33
FATIGUE SCORE: 2.2
MDHAQ FUNCTION SCORE: 1.2
PATIENT GLOBAL ASSESSMENT SCORE: 7.5
PAIN SCORE: 7.5

## 2023-09-19 ENCOUNTER — TELEPHONE (OUTPATIENT)
Dept: CARDIOLOGY | Facility: CLINIC | Age: 58
End: 2023-09-19
Payer: COMMERCIAL

## 2023-09-27 ENCOUNTER — OFFICE VISIT (OUTPATIENT)
Dept: CARDIOLOGY | Facility: CLINIC | Age: 58
End: 2023-09-27
Payer: COMMERCIAL

## 2023-09-27 VITALS
BODY MASS INDEX: 31.76 KG/M2 | WEIGHT: 186 LBS | SYSTOLIC BLOOD PRESSURE: 112 MMHG | HEIGHT: 64 IN | HEART RATE: 62 BPM | OXYGEN SATURATION: 100 % | DIASTOLIC BLOOD PRESSURE: 68 MMHG

## 2023-09-27 DIAGNOSIS — E78.5 DYSLIPIDEMIA: Primary | ICD-10-CM

## 2023-09-27 DIAGNOSIS — I10 ESSENTIAL HYPERTENSION: ICD-10-CM

## 2023-09-27 DIAGNOSIS — R94.31 NONSPECIFIC ABNORMAL ELECTROCARDIOGRAM (ECG) (EKG): ICD-10-CM

## 2023-09-27 DIAGNOSIS — R94.39 ABNORMAL NUCLEAR STRESS TEST: ICD-10-CM

## 2023-09-27 PROCEDURE — 3078F PR MOST RECENT DIASTOLIC BLOOD PRESSURE < 80 MM HG: ICD-10-PCS | Mod: CPTII,S$GLB,,

## 2023-09-27 PROCEDURE — 99214 PR OFFICE/OUTPT VISIT, EST, LEVL IV, 30-39 MIN: ICD-10-PCS | Mod: S$GLB,,,

## 2023-09-27 PROCEDURE — 3074F PR MOST RECENT SYSTOLIC BLOOD PRESSURE < 130 MM HG: ICD-10-PCS | Mod: CPTII,S$GLB,,

## 2023-09-27 PROCEDURE — 99214 OFFICE O/P EST MOD 30 MIN: CPT | Mod: S$GLB,,,

## 2023-09-27 PROCEDURE — 1159F PR MEDICATION LIST DOCUMENTED IN MEDICAL RECORD: ICD-10-PCS | Mod: CPTII,S$GLB,,

## 2023-09-27 PROCEDURE — 1160F PR REVIEW ALL MEDS BY PRESCRIBER/CLIN PHARMACIST DOCUMENTED: ICD-10-PCS | Mod: CPTII,S$GLB,,

## 2023-09-27 PROCEDURE — 3008F BODY MASS INDEX DOCD: CPT | Mod: CPTII,S$GLB,,

## 2023-09-27 PROCEDURE — 3078F DIAST BP <80 MM HG: CPT | Mod: CPTII,S$GLB,,

## 2023-09-27 PROCEDURE — 99999 PR PBB SHADOW E&M-EST. PATIENT-LVL V: CPT | Mod: PBBFAC,,,

## 2023-09-27 PROCEDURE — 1160F RVW MEDS BY RX/DR IN RCRD: CPT | Mod: CPTII,S$GLB,,

## 2023-09-27 PROCEDURE — 1159F MED LIST DOCD IN RCRD: CPT | Mod: CPTII,S$GLB,,

## 2023-09-27 PROCEDURE — 99999 PR PBB SHADOW E&M-EST. PATIENT-LVL V: ICD-10-PCS | Mod: PBBFAC,,,

## 2023-09-27 PROCEDURE — 3074F SYST BP LT 130 MM HG: CPT | Mod: CPTII,S$GLB,,

## 2023-09-27 PROCEDURE — 3008F PR BODY MASS INDEX (BMI) DOCUMENTED: ICD-10-PCS | Mod: CPTII,S$GLB,,

## 2023-09-27 RX ORDER — METOPROLOL TARTRATE 25 MG/1
25 TABLET, FILM COATED ORAL 2 TIMES DAILY
Qty: 90 TABLET | Refills: 3 | Status: SHIPPED | OUTPATIENT
Start: 2023-09-27 | End: 2024-04-03

## 2023-09-27 RX ORDER — ATORVASTATIN CALCIUM 20 MG/1
20 TABLET, FILM COATED ORAL DAILY
Qty: 90 TABLET | Refills: 3 | Status: SHIPPED | OUTPATIENT
Start: 2023-09-27 | End: 2023-12-01

## 2023-09-27 RX ORDER — METOPROLOL TARTRATE 50 MG/1
TABLET ORAL
Qty: 2 TABLET | Refills: 0 | Status: SHIPPED | OUTPATIENT
Start: 2023-09-27 | End: 2023-12-01

## 2023-09-27 RX ORDER — METOPROLOL TARTRATE 25 MG/1
25 TABLET, FILM COATED ORAL DAILY
Qty: 90 TABLET | Refills: 3 | Status: SHIPPED | OUTPATIENT
Start: 2023-09-27 | End: 2023-09-27 | Stop reason: DRUGHIGH

## 2023-09-27 RX ORDER — FUROSEMIDE 20 MG/1
20 TABLET ORAL
Qty: 90 TABLET | Refills: 3 | Status: SHIPPED | OUTPATIENT
Start: 2023-09-27 | End: 2023-12-01

## 2023-09-27 RX ORDER — HYDROCHLOROTHIAZIDE 25 MG/1
25 TABLET ORAL DAILY
Qty: 90 TABLET | Refills: 3 | Status: SHIPPED | OUTPATIENT
Start: 2023-09-27 | End: 2024-09-26

## 2023-09-27 RX ORDER — AMLODIPINE BESYLATE 5 MG/1
10 TABLET ORAL DAILY
Qty: 180 TABLET | Refills: 3 | Status: SHIPPED | OUTPATIENT
Start: 2023-09-27 | End: 2023-12-01

## 2023-09-27 NOTE — PROGRESS NOTES
Subjective:    Patient ID:  Susana Andersen is a 58 y.o. female patient here for evaluation Results      History of Present Illness:     Patient was here for test results.  Abnormal nuclear stress test that shows evidence of reversible ischemia.  Patient was asymptomatic.  No chest pain, dyspnea on exertion or anginal equivalent symptoms.  At this time recommendation is for cardiac CTA.  Blood pressure stable.  Denies any lightheadedness dizziness, jaw neck or arm pain, edema or bleeding.        Review of patient's allergies indicates:  No Known Allergies    Past Medical History:   Diagnosis Date    Allergy     Arthritis     Colon polyps 07/29/2021    Hypertension      Past Surgical History:   Procedure Laterality Date    COLONOSCOPY N/A 7/29/2021    Procedure: COLONOSCOPY;  Surgeon: Edgar Bolaños MD;  Location: [facility];  Service: Endoscopy;  Laterality: N/A;    COLONOSCOPY W/ BIOPSIES       Social History     Tobacco Use    Smoking status: Never    Smokeless tobacco: Never   Substance Use Topics    Alcohol use: Yes     Alcohol/week: 1.0 standard drink of alcohol     Types: 1 Glasses of wine per week    Drug use: Never        Review of Systems:    As noted in HPI in addition      REVIEW OF SYSTEMS  CARDIOVASCULAR: No recent chest pain, palpitations, arm, neck, or jaw pain  RESPIRATORY: No recent fever, cough chills, SOB or congestion  : No blood in the urine  GI: No Nausea, vomiting, constipation, diarrhea, blood, or reflux.  MUSCULOSKELETAL: No myalgias  NEURO: No lightheadedness or dizziness  EYES: No Double vision, blurry, vision or headache              Objective        Vitals:    09/27/23 1149   BP: 112/68   Pulse: 62       LIPIDS - LAST 2   Lab Results   Component Value Date    CHOL 205 (H) 06/11/2021    CHOL 216 (H) 04/03/2004    HDL 60 06/11/2021    HDL 51.0 04/03/2004    LDLCALC 127.4 06/11/2021    LDLCALC 155.8 (H) 04/03/2004    TRIG 88 06/11/2021    TRIG 46 04/03/2004    CHOLHDL 29.3 06/11/2021     "CHOLHDL 23.6 04/03/2004       CBC - LAST 2  Lab Results   Component Value Date    WBC 5.30 08/24/2023    WBC 5.24 03/23/2023    RBC 4.26 08/24/2023    RBC 4.58 03/23/2023    HGB 11.8 (L) 08/24/2023    HGB 13.0 03/23/2023    HCT 37.1 08/24/2023    HCT 41.1 03/23/2023    MCV 87 08/24/2023    MCV 90 03/23/2023    MCH 27.7 08/24/2023    MCH 28.4 03/23/2023    MCHC 31.8 (L) 08/24/2023    MCHC 31.6 (L) 03/23/2023    RDW 13.6 08/24/2023    RDW 13.5 03/23/2023     08/24/2023     03/23/2023    MPV 11.1 08/24/2023    MPV 11.1 03/23/2023    GRAN 2.0 08/24/2023    GRAN 38.5 08/24/2023    LYMPH 2.7 08/24/2023    LYMPH 51.1 (H) 08/24/2023    MONO 0.4 08/24/2023    MONO 7.5 08/24/2023    BASO 0.03 08/24/2023    BASO 0.03 03/23/2023    NRBC 0 08/24/2023    NRBC 0 03/23/2023       CHEMISTRY & LIVER FUNCTION - LAST 2  Lab Results   Component Value Date     08/24/2023     03/23/2023    K 3.7 08/24/2023    K 4.6 03/23/2023     08/24/2023     03/23/2023    CO2 28 08/24/2023    CO2 29 03/23/2023    ANIONGAP 9 08/24/2023    ANIONGAP 10 03/23/2023    BUN 15 08/24/2023    BUN 15 03/23/2023    CREATININE 0.8 08/24/2023    CREATININE 0.9 03/23/2023    GLU 90 08/24/2023     (H) 03/23/2023    CALCIUM 10.0 08/24/2023    CALCIUM 10.0 03/23/2023    ALBUMIN 4.0 08/24/2023    ALBUMIN 4.1 03/23/2023    PROT 7.1 08/24/2023    PROT 7.3 03/23/2023    ALKPHOS 60 08/24/2023    ALKPHOS 69 03/23/2023    ALT 18 08/24/2023    ALT 17 03/23/2023    AST 20 08/24/2023    AST 17 03/23/2023    BILITOT 0.5 08/24/2023    BILITOT 0.6 03/23/2023        CARDIAC PROFILE - LAST 2  No results found for: "BNP", "CPK", "CPKMB", "LDH", "TROPONINI", "TROPONINIHS"     COAGULATION - LAST 2  No results found for: "LABPT", "INR", "APTT"    ENDOCRINE & PSA - LAST 2  Lab Results   Component Value Date    TSH 1.129 08/24/2023    TSH 1.720 10/20/2022        ECHOCARDIOGRAM RESULTS  Results for orders placed during the hospital encounter of " 07/24/23    Echo    Interpretation Summary  · Left ventricle ejection fraction 65% all walls move normally  · Diastolic function is normal  · Mean left atrial pressure is normal no pulmonary hypertension  · Valve structures are normal with no stenosis or regurgitation  · No abnormal intracavitary echo      CURRENT/PREVIOUS VISIT EKG  Results for orders placed or performed in visit on 06/06/23   IN OFFICE EKG 12-LEAD (to [place])    Collection Time: 06/06/23 12:04 PM    Narrative    Test Reason : I10,E78.5,R94.31,    Vent. Rate : 058 BPM     Atrial Rate : 058 BPM     P-R Int : 164 ms          QRS Dur : 092 ms      QT Int : 432 ms       P-R-T Axes : 007 -01 -08 degrees     QTc Int : 424 ms    Sinus bradycardia  Minimal voltage criteria for LVH, may be normal variant ( R in aVL )  ST and T wave abnormality, consider anterior ischemia  Abnormal ECG  When compared with ECG of 27-JUL-2021 08:12,  No significant change was found  Confirmed by Romain Gonsales MD ([id number]) on 7/15/2023 5:05:24 PM    Referred By: EMBER VALERA           Confirmed By:Romain Gonsales MD     No valid procedures specified.   Results for orders placed during the hospital encounter of 07/24/23    Nuclear Stress - Cardiology Interpreted    Interpretation Summary    Abnormal myocardial perfusion scan.    There are two significant perfusion abnormalities.    Perfusion Abnormality #1 - There is a moderate intensity, moderate sized, reversible perfusion abnormality that is consistent with ischemia in the basal to apical inferior wall(s).    Perfusion Abnormality #2 - There is a small to moderate sized, moderate to severe intensity, mostly fixed perfusion abnormality with some reversibilty in the mid to apical anteroapical wall(s).    There are no other significant perfusion abnormalities.    There is a moderate to severe intensity perfusion abnormality in the  wall of the left ventricle, secondary to breast attenuation.    There is a  moderate to severe  intensity fixed perfusion abnormality in the  wall of the left ventricle secondary to diaphragm attenuation.    The gated perfusion images showed an ejection fraction of 65% at rest. The gated perfusion images showed an ejection fraction of 71% post stress. Normal ejection fraction is greater than 53%.    There is normal wall motion at rest and post stress.    LV cavity size is normal at rest and normal at stress.    The ECG portion of the study is negative for ischemia.    The patient reported no chest pain during the stress test.    There were no arrhythmias during stress.    A PET stress exam is recommended if clinically indicated.    No valid procedures specified.    PHYSICAL EXAM  CONSTITUTIONAL: Well built, well nourished in no apparent distress  NECK: no carotid bruit, no JVD  LUNGS: CTA  CHEST WALL: no tenderness  HEART: regular rate and rhythm, S1, S2 normal, no murmur, click, rub or gallop   ABDOMEN: soft, non-tender; bowel sounds normal;  EXTREMITIES: Extremities normal, no edema  NEURO: AAO X 3    I HAVE REVIEWED :    The vital signs, nurses notes, and all the pertinent radiology and labs.        Current Outpatient Medications   Medication Instructions    amitriptyline (ELAVIL) 10 mg, Oral, Nightly PRN    amLODIPine (NORVASC) 10 mg, Oral, Daily    atorvastatin (LIPITOR) 20 mg, Oral, Daily    azelastine (ASTELIN) 137 mcg, Nasal, 2 times daily    cetirizine (ZYRTEC) 10 mg, Oral, Daily    ciclopirox (PENLAC) 8 % Soln ciclopirox 8 % topical solution    diclofenac sodium (VOLTAREN) 1 % Gel Topical    fluticasone propionate (FLONASE) 50 mcg/actuation nasal spray 2 sprays, Nasal    furosemide (LASIX) 20 mg, Oral, As needed (PRN)    gabapentin (NEURONTIN) 600 mg, Oral, 3 times daily    hydroCHLOROthiazide (HYDRODIURIL) 25 mg, Oral, Daily    [START ON 11/1/2023] HYDROcodone-acetaminophen (NORCO)  mg per tablet 1 tablet, Oral, Every 8 hours PRN    [START ON 10/2/2023] HYDROcodone-acetaminophen (NORCO)   mg per tablet 1 tablet, Oral, Every 8 hours PRN    HYDROcodone-acetaminophen (NORCO)  mg per tablet 1 tablet, Oral, Every 8 hours PRN    lactobacillus acidophilus & bulgar (LACTINEX) 100 million cell packet 1 tablet, Oral, 2 times daily    levocetirizine (XYZAL) 2.5 mg, Oral, Nightly    lifitegrast (XIIDRA) 5 % Dpet No dose, route, or frequency recorded.    methocarbamoL (ROBAXIN) 500 mg, Oral, 3 times daily PRN    metoprolol tartrate (LOPRESSOR) 50 MG tablet If heart rate >60 bpm:  take 50 mg tablet 1 hour prior to CTA.  Bring second tab with you to the procedure.    metoprolol tartrate (LOPRESSOR) 25 mg, Oral, 2 times daily    mupirocin (BACTROBAN) 2 % ointment NABILA EXT AA BID    omega-3 fatty acids/fish oil (FISH OIL-OMEGA-3 FATTY ACIDS) 300-1,000 mg capsule Oral, Daily    predniSONE (DELTASONE) 2.5 mg, Oral, Daily    RESTASIS 0.05 % ophthalmic emulsion 1 drop, Both Eyes, 2 times daily    sulfaSALAzine (AZULFIDINE) 1,000 mg, Oral, 2 times daily    vitamin D (VITAMIN D3) 1,000 Units, Oral, Daily    VITAMIN D2 50,000 Units, Oral, Every 7 days          Assessment & Plan     Dyslipidemia  Patient was overdue for lipid panel.  Orders placed today in office.  Continue low-sodium heart healthy diet.  Continue statin therapy.    Essential hypertension  Blood pressure is 112/68 today in office.  Continue amlodipine 10 mg daily, Lasix 20 mg p.r.n., HCTZ 25 mg daily, metoprolol tartrate 25 mg b.i.d., continue low-sodium heart healthy diet.    Nonspecific abnormal electrocardiogram (ECG) (EKG)  Patient had abnormal EKG with T-wave abnormalities in the anterior leads.  Lexiscan was performed that showed reversible ischemia.  Patient denies any chest pain or anginal equivalent symptoms.  We will further evaluate with a coronary CTA.  Discussed with patient that if she should experience any chest pain or shortness of breath that she should seek further evaluation in the ER.    Abnormal nuclear stress test  Denies anginal  equivalent symptoms.  Discussed options of angiogram versus coronary CTA.  Patient prefers coronary CTA at this time prior to invasive angiogram.  Order placed for cardiac CTA.  Metoprolol tartrate 50 mg, 2 tablets sent to pharmacy for patient to take 1 hour prior to test and to bring 1 tablet with her to test.  Patient to notify office if she was not here from hospital for scheduling.  Continue current medication regimen.          No follow-ups on file.

## 2023-09-28 ENCOUNTER — TELEPHONE (OUTPATIENT)
Dept: CARDIOLOGY | Facility: CLINIC | Age: 58
End: 2023-09-28
Payer: COMMERCIAL

## 2023-09-28 NOTE — ASSESSMENT & PLAN NOTE
Patient had abnormal EKG with T-wave abnormalities in the anterior leads.  Lexiscan was performed that showed reversible ischemia.  Patient denies any chest pain or anginal equivalent symptoms.  We will further evaluate with a coronary CTA.  Discussed with patient that if she should experience any chest pain or shortness of breath that she should seek further evaluation in the ER.

## 2023-09-28 NOTE — ASSESSMENT & PLAN NOTE
Denies anginal equivalent symptoms.  Discussed options of angiogram versus coronary CTA.  Patient prefers coronary CTA at this time prior to invasive angiogram.  Order placed for cardiac CTA.  Metoprolol tartrate 50 mg, 2 tablets sent to pharmacy for patient to take 1 hour prior to test and to bring 1 tablet with her to test.  Patient to notify office if she was not here from hospital for scheduling.  Continue current medication regimen.

## 2023-09-28 NOTE — ASSESSMENT & PLAN NOTE
Patient was overdue for lipid panel.  Orders placed today in office.  Continue low-sodium heart healthy diet.  Continue statin therapy.

## 2023-09-28 NOTE — ASSESSMENT & PLAN NOTE
Blood pressure is 112/68 today in office.  Continue amlodipine 10 mg daily, Lasix 20 mg p.r.n., HCTZ 25 mg daily, metoprolol tartrate 25 mg b.i.d., continue low-sodium heart healthy diet.

## 2023-09-28 NOTE — TELEPHONE ENCOUNTER
Tried to call patient to give date of testing. VM not set up and no answer. Did send [employer] message.

## 2023-10-07 ENCOUNTER — LAB VISIT (OUTPATIENT)
Dept: LAB | Facility: HOSPITAL | Age: 58
End: 2023-10-07
Payer: COMMERCIAL

## 2023-10-07 DIAGNOSIS — E78.5 DYSLIPIDEMIA: ICD-10-CM

## 2023-10-07 LAB
CHOLEST SERPL-MCNC: 116 MG/DL (ref 120–199)
CHOLEST/HDLC SERPL: 3.2 {RATIO} (ref 2–5)
HDLC SERPL-MCNC: 36 MG/DL (ref 40–75)
HDLC SERPL: 31 % (ref 20–50)
LDLC SERPL CALC-MCNC: 47.2 MG/DL (ref 63–159)
NONHDLC SERPL-MCNC: 80 MG/DL
TRIGL SERPL-MCNC: 164 MG/DL (ref 30–150)

## 2023-10-07 PROCEDURE — 36415 COLL VENOUS BLD VENIPUNCTURE: CPT

## 2023-10-07 PROCEDURE — 80061 LIPID PANEL: CPT

## 2023-11-01 ENCOUNTER — TELEPHONE (OUTPATIENT)
Dept: CARDIOLOGY | Facility: CLINIC | Age: 58
End: 2023-11-01
Payer: COMMERCIAL

## 2023-11-24 ENCOUNTER — LAB VISIT (OUTPATIENT)
Dept: LAB | Facility: HOSPITAL | Age: 58
End: 2023-11-24
Attending: INTERNAL MEDICINE
Payer: COMMERCIAL

## 2023-11-24 DIAGNOSIS — M06.00 SERONEGATIVE RHEUMATOID ARTHRITIS: ICD-10-CM

## 2023-11-24 LAB
ALBUMIN SERPL BCP-MCNC: 4 G/DL (ref 3.5–5.2)
ALP SERPL-CCNC: 53 U/L (ref 55–135)
ALT SERPL W/O P-5'-P-CCNC: 19 U/L (ref 10–44)
ANION GAP SERPL CALC-SCNC: 4 MMOL/L (ref 8–16)
AST SERPL-CCNC: 18 U/L (ref 10–40)
BASOPHILS # BLD AUTO: 0.02 K/UL (ref 0–0.2)
BASOPHILS NFR BLD: 0.4 % (ref 0–1.9)
BILIRUB SERPL-MCNC: 0.4 MG/DL (ref 0.1–1)
BUN SERPL-MCNC: 17 MG/DL (ref 6–20)
CALCIUM SERPL-MCNC: 9.4 MG/DL (ref 8.7–10.5)
CHLORIDE SERPL-SCNC: 106 MMOL/L (ref 95–110)
CO2 SERPL-SCNC: 33 MMOL/L (ref 23–29)
CREAT SERPL-MCNC: 0.7 MG/DL (ref 0.5–1.4)
CRP SERPL-MCNC: 6.8 MG/L (ref 0–8.2)
DIFFERENTIAL METHOD: ABNORMAL
EOSINOPHIL # BLD AUTO: 0.1 K/UL (ref 0–0.5)
EOSINOPHIL NFR BLD: 1.3 % (ref 0–8)
ERYTHROCYTE [DISTWIDTH] IN BLOOD BY AUTOMATED COUNT: 14.3 % (ref 11.5–14.5)
ERYTHROCYTE [SEDIMENTATION RATE] IN BLOOD BY WESTERGREN METHOD: 1 MM/HR (ref 0–20)
EST. GFR  (NO RACE VARIABLE): >60 ML/MIN/1.73 M^2
GLUCOSE SERPL-MCNC: 107 MG/DL (ref 70–110)
HCT VFR BLD AUTO: 41.3 % (ref 37–48.5)
HGB BLD-MCNC: 13.1 G/DL (ref 12–16)
IMM GRANULOCYTES # BLD AUTO: 0.01 K/UL (ref 0–0.04)
IMM GRANULOCYTES NFR BLD AUTO: 0.2 % (ref 0–0.5)
LYMPHOCYTES # BLD AUTO: 2.4 K/UL (ref 1–4.8)
LYMPHOCYTES NFR BLD: 44.3 % (ref 18–48)
MCH RBC QN AUTO: 27.5 PG (ref 27–31)
MCHC RBC AUTO-ENTMCNC: 31.7 G/DL (ref 32–36)
MCV RBC AUTO: 87 FL (ref 82–98)
MONOCYTES # BLD AUTO: 0.4 K/UL (ref 0.3–1)
MONOCYTES NFR BLD: 6.8 % (ref 4–15)
NEUTROPHILS # BLD AUTO: 2.5 K/UL (ref 1.8–7.7)
NEUTROPHILS NFR BLD: 47 % (ref 38–73)
NRBC BLD-RTO: 0 /100 WBC
PLATELET # BLD AUTO: 301 K/UL (ref 150–450)
PMV BLD AUTO: 9.8 FL (ref 9.2–12.9)
POTASSIUM SERPL-SCNC: 4.1 MMOL/L (ref 3.5–5.1)
PROT SERPL-MCNC: 6.8 G/DL (ref 6–8.4)
RBC # BLD AUTO: 4.77 M/UL (ref 4–5.4)
SODIUM SERPL-SCNC: 143 MMOL/L (ref 136–145)
WBC # BLD AUTO: 5.31 K/UL (ref 3.9–12.7)

## 2023-11-24 PROCEDURE — 85025 COMPLETE CBC W/AUTO DIFF WBC: CPT | Performed by: PHYSICIAN ASSISTANT

## 2023-11-24 PROCEDURE — 85651 RBC SED RATE NONAUTOMATED: CPT | Performed by: PHYSICIAN ASSISTANT

## 2023-11-24 PROCEDURE — 36415 COLL VENOUS BLD VENIPUNCTURE: CPT | Performed by: PHYSICIAN ASSISTANT

## 2023-11-24 PROCEDURE — 80053 COMPREHEN METABOLIC PANEL: CPT | Performed by: PHYSICIAN ASSISTANT

## 2023-11-24 PROCEDURE — 86140 C-REACTIVE PROTEIN: CPT | Performed by: PHYSICIAN ASSISTANT

## 2023-11-30 DIAGNOSIS — Z79.899 IMMUNOCOMPROMISED STATE DUE TO DRUG THERAPY: ICD-10-CM

## 2023-11-30 DIAGNOSIS — D84.821 IMMUNOCOMPROMISED STATE DUE TO DRUG THERAPY: ICD-10-CM

## 2023-11-30 DIAGNOSIS — G89.4 CHRONIC PAIN SYNDROME: ICD-10-CM

## 2023-11-30 DIAGNOSIS — M54.2 NECK PAIN, ACUTE: ICD-10-CM

## 2023-11-30 DIAGNOSIS — M06.00 SERONEGATIVE RHEUMATOID ARTHRITIS: ICD-10-CM

## 2023-11-30 RX ORDER — HYDROCODONE BITARTRATE AND ACETAMINOPHEN 10; 325 MG/1; MG/1
1 TABLET ORAL EVERY 8 HOURS PRN
Qty: 90 TABLET | Refills: 0 | Status: CANCELLED | OUTPATIENT
Start: 2023-11-30 | End: 2023-12-30

## 2023-12-01 ENCOUNTER — OFFICE VISIT (OUTPATIENT)
Dept: RHEUMATOLOGY | Facility: CLINIC | Age: 58
End: 2023-12-01
Payer: COMMERCIAL

## 2023-12-01 ENCOUNTER — OFFICE VISIT (OUTPATIENT)
Dept: CARDIOLOGY | Facility: CLINIC | Age: 58
End: 2023-12-01
Payer: COMMERCIAL

## 2023-12-01 VITALS
DIASTOLIC BLOOD PRESSURE: 70 MMHG | OXYGEN SATURATION: 96 % | HEIGHT: 64 IN | WEIGHT: 187 LBS | BODY MASS INDEX: 31.92 KG/M2 | SYSTOLIC BLOOD PRESSURE: 110 MMHG | HEART RATE: 94 BPM

## 2023-12-01 VITALS
WEIGHT: 189.38 LBS | DIASTOLIC BLOOD PRESSURE: 84 MMHG | SYSTOLIC BLOOD PRESSURE: 152 MMHG | HEART RATE: 123 BPM | BODY MASS INDEX: 32.33 KG/M2 | HEIGHT: 64 IN

## 2023-12-01 DIAGNOSIS — G89.4 CHRONIC PAIN SYNDROME: ICD-10-CM

## 2023-12-01 DIAGNOSIS — E09.9 STEROID-INDUCED DIABETES MELLITUS, SEQUELA: ICD-10-CM

## 2023-12-01 DIAGNOSIS — R94.31 NONSPECIFIC ABNORMAL ELECTROCARDIOGRAM (ECG) (EKG): ICD-10-CM

## 2023-12-01 DIAGNOSIS — R94.39 ABNORMAL NUCLEAR STRESS TEST: ICD-10-CM

## 2023-12-01 DIAGNOSIS — G47.00 INSOMNIA, UNSPECIFIED TYPE: ICD-10-CM

## 2023-12-01 DIAGNOSIS — E11.69 TYPE 2 DIABETES MELLITUS WITH OTHER SPECIFIED COMPLICATION, UNSPECIFIED WHETHER LONG TERM INSULIN USE: Primary | ICD-10-CM

## 2023-12-01 DIAGNOSIS — M06.00 SERONEGATIVE RHEUMATOID ARTHRITIS: ICD-10-CM

## 2023-12-01 DIAGNOSIS — H04.123 DRY EYES: ICD-10-CM

## 2023-12-01 DIAGNOSIS — M54.2 NECK PAIN, ACUTE: ICD-10-CM

## 2023-12-01 DIAGNOSIS — R60.0 PERIPHERAL EDEMA: ICD-10-CM

## 2023-12-01 DIAGNOSIS — E78.5 DYSLIPIDEMIA: ICD-10-CM

## 2023-12-01 DIAGNOSIS — E55.9 VITAMIN D DEFICIENCY: ICD-10-CM

## 2023-12-01 DIAGNOSIS — T38.0X5S STEROID-INDUCED DIABETES MELLITUS, SEQUELA: ICD-10-CM

## 2023-12-01 DIAGNOSIS — I10 ESSENTIAL HYPERTENSION: Primary | ICD-10-CM

## 2023-12-01 PROCEDURE — 4010F PR ACE/ARB THEARPY RXD/TAKEN: ICD-10-PCS | Mod: CPTII,S$GLB,, | Performed by: INTERNAL MEDICINE

## 2023-12-01 PROCEDURE — 1159F PR MEDICATION LIST DOCUMENTED IN MEDICAL RECORD: ICD-10-PCS | Mod: CPTII,S$GLB,, | Performed by: NURSE PRACTITIONER

## 2023-12-01 PROCEDURE — 3077F PR MOST RECENT SYSTOLIC BLOOD PRESSURE >= 140 MM HG: ICD-10-PCS | Mod: CPTII,S$GLB,, | Performed by: INTERNAL MEDICINE

## 2023-12-01 PROCEDURE — 3077F SYST BP >= 140 MM HG: CPT | Mod: CPTII,S$GLB,, | Performed by: INTERNAL MEDICINE

## 2023-12-01 PROCEDURE — 4010F ACE/ARB THERAPY RXD/TAKEN: CPT | Mod: CPTII,S$GLB,, | Performed by: INTERNAL MEDICINE

## 2023-12-01 PROCEDURE — 3074F SYST BP LT 130 MM HG: CPT | Mod: CPTII,S$GLB,, | Performed by: NURSE PRACTITIONER

## 2023-12-01 PROCEDURE — 96372 PR INJECTION,THERAP/PROPH/DIAG2ST, IM OR SUBCUT: ICD-10-PCS | Mod: S$GLB,,, | Performed by: INTERNAL MEDICINE

## 2023-12-01 PROCEDURE — 3079F DIAST BP 80-89 MM HG: CPT | Mod: CPTII,S$GLB,, | Performed by: INTERNAL MEDICINE

## 2023-12-01 PROCEDURE — 99999 PR PBB SHADOW E&M-EST. PATIENT-LVL IV: CPT | Mod: PBBFAC,,, | Performed by: INTERNAL MEDICINE

## 2023-12-01 PROCEDURE — 99214 PR OFFICE/OUTPT VISIT, EST, LEVL IV, 30-39 MIN: ICD-10-PCS | Mod: S$GLB,,, | Performed by: NURSE PRACTITIONER

## 2023-12-01 PROCEDURE — 1160F PR REVIEW ALL MEDS BY PRESCRIBER/CLIN PHARMACIST DOCUMENTED: ICD-10-PCS | Mod: CPTII,S$GLB,, | Performed by: NURSE PRACTITIONER

## 2023-12-01 PROCEDURE — 1160F PR REVIEW ALL MEDS BY PRESCRIBER/CLIN PHARMACIST DOCUMENTED: ICD-10-PCS | Mod: CPTII,S$GLB,, | Performed by: INTERNAL MEDICINE

## 2023-12-01 PROCEDURE — 3008F BODY MASS INDEX DOCD: CPT | Mod: CPTII,S$GLB,, | Performed by: INTERNAL MEDICINE

## 2023-12-01 PROCEDURE — 99999 PR PBB SHADOW E&M-EST. PATIENT-LVL V: ICD-10-PCS | Mod: PBBFAC,,, | Performed by: NURSE PRACTITIONER

## 2023-12-01 PROCEDURE — 99999 PR PBB SHADOW E&M-EST. PATIENT-LVL V: CPT | Mod: PBBFAC,,, | Performed by: NURSE PRACTITIONER

## 2023-12-01 PROCEDURE — 3074F PR MOST RECENT SYSTOLIC BLOOD PRESSURE < 130 MM HG: ICD-10-PCS | Mod: CPTII,S$GLB,, | Performed by: NURSE PRACTITIONER

## 2023-12-01 PROCEDURE — 99214 OFFICE O/P EST MOD 30 MIN: CPT | Mod: S$GLB,,, | Performed by: NURSE PRACTITIONER

## 2023-12-01 PROCEDURE — 1159F PR MEDICATION LIST DOCUMENTED IN MEDICAL RECORD: ICD-10-PCS | Mod: CPTII,S$GLB,, | Performed by: INTERNAL MEDICINE

## 2023-12-01 PROCEDURE — 3008F BODY MASS INDEX DOCD: CPT | Mod: CPTII,S$GLB,, | Performed by: NURSE PRACTITIONER

## 2023-12-01 PROCEDURE — 3008F PR BODY MASS INDEX (BMI) DOCUMENTED: ICD-10-PCS | Mod: CPTII,S$GLB,, | Performed by: NURSE PRACTITIONER

## 2023-12-01 PROCEDURE — 1159F MED LIST DOCD IN RCRD: CPT | Mod: CPTII,S$GLB,, | Performed by: INTERNAL MEDICINE

## 2023-12-01 PROCEDURE — 99215 PR OFFICE/OUTPT VISIT, EST, LEVL V, 40-54 MIN: ICD-10-PCS | Mod: 25,S$GLB,, | Performed by: INTERNAL MEDICINE

## 2023-12-01 PROCEDURE — 99215 OFFICE O/P EST HI 40 MIN: CPT | Mod: 25,S$GLB,, | Performed by: INTERNAL MEDICINE

## 2023-12-01 PROCEDURE — 96372 THER/PROPH/DIAG INJ SC/IM: CPT | Mod: S$GLB,,, | Performed by: INTERNAL MEDICINE

## 2023-12-01 PROCEDURE — 99999 PR PBB SHADOW E&M-EST. PATIENT-LVL IV: ICD-10-PCS | Mod: PBBFAC,,, | Performed by: INTERNAL MEDICINE

## 2023-12-01 PROCEDURE — 3078F PR MOST RECENT DIASTOLIC BLOOD PRESSURE < 80 MM HG: ICD-10-PCS | Mod: CPTII,S$GLB,, | Performed by: NURSE PRACTITIONER

## 2023-12-01 PROCEDURE — 1160F RVW MEDS BY RX/DR IN RCRD: CPT | Mod: CPTII,S$GLB,, | Performed by: NURSE PRACTITIONER

## 2023-12-01 PROCEDURE — 1159F MED LIST DOCD IN RCRD: CPT | Mod: CPTII,S$GLB,, | Performed by: NURSE PRACTITIONER

## 2023-12-01 PROCEDURE — 3078F DIAST BP <80 MM HG: CPT | Mod: CPTII,S$GLB,, | Performed by: NURSE PRACTITIONER

## 2023-12-01 PROCEDURE — 1160F RVW MEDS BY RX/DR IN RCRD: CPT | Mod: CPTII,S$GLB,, | Performed by: INTERNAL MEDICINE

## 2023-12-01 PROCEDURE — 3008F PR BODY MASS INDEX (BMI) DOCUMENTED: ICD-10-PCS | Mod: CPTII,S$GLB,, | Performed by: INTERNAL MEDICINE

## 2023-12-01 PROCEDURE — 3079F PR MOST RECENT DIASTOLIC BLOOD PRESSURE 80-89 MM HG: ICD-10-PCS | Mod: CPTII,S$GLB,, | Performed by: INTERNAL MEDICINE

## 2023-12-01 RX ORDER — AMITRIPTYLINE HYDROCHLORIDE 10 MG/1
10 TABLET, FILM COATED ORAL NIGHTLY PRN
Qty: 90 TABLET | Refills: 2 | Status: SHIPPED | OUTPATIENT
Start: 2023-12-01 | End: 2024-11-30

## 2023-12-01 RX ORDER — METHOCARBAMOL 500 MG/1
500 TABLET, FILM COATED ORAL 3 TIMES DAILY PRN
Qty: 270 TABLET | Refills: 2 | Status: SHIPPED | OUTPATIENT
Start: 2023-12-01 | End: 2024-11-30

## 2023-12-01 RX ORDER — METHYLPREDNISOLONE ACETATE 80 MG/ML
160 INJECTION, SUSPENSION INTRA-ARTICULAR; INTRALESIONAL; INTRAMUSCULAR; SOFT TISSUE
Status: COMPLETED | OUTPATIENT
Start: 2023-12-01 | End: 2023-12-01

## 2023-12-01 RX ORDER — ERGOCALCIFEROL 1.25 MG/1
50000 CAPSULE ORAL
Qty: 12 CAPSULE | Refills: 1 | Status: SHIPPED | OUTPATIENT
Start: 2023-12-01

## 2023-12-01 RX ORDER — ROSUVASTATIN CALCIUM 10 MG/1
10 TABLET, COATED ORAL NIGHTLY
COMMUNITY
Start: 2023-11-22

## 2023-12-01 RX ORDER — SEMAGLUTIDE 0.68 MG/ML
0.25 INJECTION, SOLUTION SUBCUTANEOUS
Qty: 3 ML | Refills: 11 | Status: SHIPPED | OUTPATIENT
Start: 2023-12-01 | End: 2024-04-03

## 2023-12-01 RX ORDER — MONTELUKAST SODIUM 10 MG/1
10 TABLET ORAL
COMMUNITY
Start: 2023-11-22 | End: 2024-01-19

## 2023-12-01 RX ORDER — GABAPENTIN 600 MG/1
600 TABLET ORAL 3 TIMES DAILY
Qty: 270 TABLET | Refills: 1 | Status: SHIPPED | OUTPATIENT
Start: 2023-12-01 | End: 2024-11-30

## 2023-12-01 RX ORDER — CYANOCOBALAMIN 1000 UG/ML
1000 INJECTION, SOLUTION INTRAMUSCULAR; SUBCUTANEOUS
Status: COMPLETED | OUTPATIENT
Start: 2023-12-01 | End: 2023-12-01

## 2023-12-01 RX ORDER — SULFASALAZINE 500 MG/1
1000 TABLET, DELAYED RELEASE ORAL 2 TIMES DAILY
Qty: 360 TABLET | Refills: 3 | Status: SHIPPED | OUTPATIENT
Start: 2023-12-01 | End: 2024-11-30

## 2023-12-01 RX ORDER — LOSARTAN POTASSIUM 50 MG/1
50 TABLET ORAL DAILY
Qty: 90 TABLET | Refills: 3 | Status: SHIPPED | OUTPATIENT
Start: 2023-12-01 | End: 2024-11-30

## 2023-12-01 RX ORDER — KETOROLAC TROMETHAMINE 30 MG/ML
60 INJECTION, SOLUTION INTRAMUSCULAR; INTRAVENOUS
Status: COMPLETED | OUTPATIENT
Start: 2023-12-01 | End: 2023-12-01

## 2023-12-01 RX ORDER — SEMAGLUTIDE 0.68 MG/ML
0.5 INJECTION, SOLUTION SUBCUTANEOUS
Qty: 3 ML | Refills: 5 | Status: SHIPPED | OUTPATIENT
Start: 2023-12-01 | End: 2024-03-01

## 2023-12-01 RX ORDER — HYDROCODONE BITARTRATE AND ACETAMINOPHEN 10; 325 MG/1; MG/1
1 TABLET ORAL EVERY 8 HOURS PRN
Qty: 90 TABLET | Refills: 0 | Status: SHIPPED | OUTPATIENT
Start: 2023-12-01 | End: 2024-01-02 | Stop reason: SDUPTHER

## 2023-12-01 RX ORDER — LIFITEGRAST 50 MG/ML
1 SOLUTION/ DROPS OPHTHALMIC 2 TIMES DAILY
Qty: 180 EACH | Refills: 1 | Status: SHIPPED | OUTPATIENT
Start: 2023-12-01 | End: 2024-11-30

## 2023-12-01 RX ADMIN — KETOROLAC TROMETHAMINE 60 MG: 30 INJECTION, SOLUTION INTRAMUSCULAR; INTRAVENOUS at 10:12

## 2023-12-01 RX ADMIN — CYANOCOBALAMIN 1000 MCG: 1000 INJECTION, SOLUTION INTRAMUSCULAR; SUBCUTANEOUS at 10:12

## 2023-12-01 RX ADMIN — METHYLPREDNISOLONE ACETATE 160 MG: 80 INJECTION, SUSPENSION INTRA-ARTICULAR; INTRALESIONAL; INTRAMUSCULAR; SOFT TISSUE at 10:12

## 2023-12-01 ASSESSMENT — ROUTINE ASSESSMENT OF PATIENT INDEX DATA (RAPID3)
PSYCHOLOGICAL DISTRESS SCORE: 2.2
MDHAQ FUNCTION SCORE: 0.8
PATIENT GLOBAL ASSESSMENT SCORE: 6.5
FATIGUE SCORE: 1.1
PAIN SCORE: 7.5
TOTAL RAPID3 SCORE: 5.55

## 2023-12-01 NOTE — PROGRESS NOTES
2 pt ID used, allergies reviewed, See MAR for meds, doses, and injection sites. Patient tolerated well. TC LPN

## 2023-12-01 NOTE — PROGRESS NOTES
Subjective:       Patient ID: Susana Andersen is a 58 y.o. female.    Chief Complaint: Disease Management      Follow up: 58 year old female who presents to clinic for follow up on seronegative rheumatoid arthritis. She had a new episode tachycardia 100,  he has been compliant with SSZ 1000 mg bid.. Seeing cardiology today.She is taking prednisone 2.5 mg 2-3 days a week as needed for pain.  Increasing gabapentin 600 mg has been helpful. She continues to have pain and swelling in her hands R>L and wrist. She is prescribed norco for severe pain, which is helpful.    We reviewed her recent labs and ESR remains elevated, but is improving. Vitamin D is low.     Current tx:  1. SSZ  2. Norco  3. Gabapentin      Review of Systems   Constitutional:  Positive for activity change. Negative for appetite change and chills.   Eyes:  Negative for visual disturbance.   Respiratory:  Negative for cough and shortness of breath.    Cardiovascular:  Negative for chest pain, palpitations and leg swelling.   Gastrointestinal:  Negative for abdominal pain, constipation, diarrhea, nausea and vomiting.   Musculoskeletal:  Positive for arthralgias.   Allergic/Immunologic: Negative for immunocompromised state.   Neurological:  Negative for dizziness, weakness and light-headedness.         Objective:     Vitals:    12/01/23 0814   BP: (!) 152/84   Pulse: (!) 123       Past Medical History:   Diagnosis Date    Allergy     Arthritis     Colon polyps 07/29/2021    Hypertension      Past Surgical History:   Procedure Laterality Date    COLONOSCOPY N/A 7/29/2021    Procedure: COLONOSCOPY;  Surgeon: Edgar Bolaños MD;  Location: [facility];  Service: Endoscopy;  Laterality: N/A;    COLONOSCOPY W/ BIOPSIES            Physical Exam   Neck: No JVD present. No thyromegaly present.   Cardiovascular: Normal rate and regular rhythm. Exam reveals no decreased pulses.   Pulmonary/Chest: Effort normal.   Musculoskeletal:      Right shoulder: Normal.       Left shoulder: Normal.      Right elbow: Normal.      Left elbow: Normal.      Right wrist: Swelling and tenderness present.      Left wrist: Swelling and tenderness present.      Right knee: Tenderness present.      Left knee: Tenderness present.   Lymphadenopathy:     She has no cervical adenopathy.   Neurological: Gait normal.   Skin: No rash noted.   Psychiatric: Mood and affect normal.       Right Side Rheumatological Exam     Examination finds the shoulder, elbow, 1st MCP, 4th MCP and 5th MCP normal.    The patient is tender to palpation of the wrist, knee, 1st PIP, 2nd PIP, 2nd MCP, 3rd PIP, 3rd MCP, 4th PIP and 5th PIP    She has swelling of the wrist, 1st PIP, 2nd PIP, 2nd MCP, 3rd PIP, 3rd MCP, 4th PIP and 5th PIP    Left Side Rheumatological Exam     Examination finds the shoulder, elbow, 1st PIP, 1st MCP, 2nd PIP, 2nd MCP, 3rd PIP, 3rd MCP, 4th PIP, 4th MCP, 5th PIP and 5th MCP normal.    The patient is tender to palpation of the wrist and knee.    She has swelling of the wrist            Results for orders placed or performed in visit on 11/24/23   CBC Auto Differential   Result Value Ref Range    WBC 5.31 3.90 - 12.70 K/uL    RBC 4.77 4.00 - 5.40 M/uL    Hemoglobin 13.1 12.0 - 16.0 g/dL    Hematocrit 41.3 37.0 - 48.5 %    MCV 87 82 - 98 fL    MCH 27.5 27.0 - 31.0 pg    MCHC 31.7 (L) 32.0 - 36.0 g/dL    RDW 14.3 11.5 - 14.5 %    Platelets 301 150 - 450 K/uL    MPV 9.8 9.2 - 12.9 fL    Immature Granulocytes 0.2 0.0 - 0.5 %    Gran # (ANC) 2.5 1.8 - 7.7 K/uL    Immature Grans (Abs) 0.01 0.00 - 0.04 K/uL    Lymph # 2.4 1.0 - 4.8 K/uL    Mono # 0.4 0.3 - 1.0 K/uL    Eos # 0.1 0.0 - 0.5 K/uL    Baso # 0.02 0.00 - 0.20 K/uL    nRBC 0 0 /100 WBC    Gran % 47.0 38.0 - 73.0 %    Lymph % 44.3 18.0 - 48.0 %    Mono % 6.8 4.0 - 15.0 %    Eosinophil % 1.3 0.0 - 8.0 %    Basophil % 0.4 0.0 - 1.9 %    Differential Method Automated    Comprehensive Metabolic Panel   Result Value Ref Range    Sodium 143 136 - 145  mmol/L    Potassium 4.1 3.5 - 5.1 mmol/L    Chloride 106 95 - 110 mmol/L    CO2 33 (H) 23 - 29 mmol/L    Glucose 107 70 - 110 mg/dL    BUN 17 6 - 20 mg/dL    Creatinine 0.7 0.5 - 1.4 mg/dL    Calcium 9.4 8.7 - 10.5 mg/dL    Total Protein 6.8 6.0 - 8.4 g/dL    Albumin 4.0 3.5 - 5.2 g/dL    Total Bilirubin 0.4 0.1 - 1.0 mg/dL    Alkaline Phosphatase 53 (L) 55 - 135 U/L    AST 18 10 - 40 U/L    ALT 19 10 - 44 U/L    eGFR >60.0 >60 mL/min/1.73 m^2    Anion Gap 4 (L) 8 - 16 mmol/L   C-Reactive Protein   Result Value Ref Range    CRP 6.8 0.0 - 8.2 mg/L   Sedimentation rate   Result Value Ref Range    Sed Rate 1 0 - 20 mm/Hr           Assessment:       1. Type 2 diabetes mellitus with other specified complication, unspecified whether long term insulin use    2. Seronegative rheumatoid arthritis    3. Chronic pain syndrome    4. Neck pain, acute    5. Vitamin D deficiency    6. Insomnia, unspecified type    7. Steroid-induced diabetes mellitus, sequela    8. Dry eyes              Plan:       Type 2 diabetes mellitus with other specified complication, unspecified whether long term insulin use  -     semaglutide (OZEMPIC) 0.25 mg or 0.5 mg(2 mg/1.5 mL) pen injector; Inject 0.25 mg into the skin every 7 days.  Dispense: 0.75 mL; Refill: 11  -     semaglutide (OZEMPIC) 0.25 mg or 0.5 mg (2 mg/3 mL) pen injector; Inject 0.5 mg into the skin every 7 days.  Dispense: 3 mL; Refill: 5  -     ketorolac injection 60 mg  -     methylPREDNISolone acetate injection 160 mg  -     cyanocobalamin injection 1,000 mcg  -     T4, Free; Future; Expected date: 12/01/2023  -     TSH; Future; Expected date: 12/01/2023  -     Vitamin D; Future; Expected date: 12/01/2023  -     Cyclic Citrullinated Peptide Antibody, IgG; Future; Expected date: 12/01/2023  -     Rheumatoid Factor; Future; Expected date: 12/01/2023  -     Sedimentation rate; Future; Expected date: 12/01/2023  -     C-Reactive Protein; Future; Expected date: 12/01/2023  -      Comprehensive Metabolic Panel; Future; Expected date: 12/01/2023  -     CBC Auto Differential; Future; Expected date: 12/01/2023    Seronegative rheumatoid arthritis  -     methocarbamoL (ROBAXIN) 500 MG Tab; Take 1 tablet (500 mg total) by mouth 3 (three) times daily as needed (muscle spasms).  Dispense: 270 tablet; Refill: 2  -     sulfaSALAzine (AZULFIDINE) 500 MG EC tablet; Take 2 tablets (1,000 mg total) by mouth 2 (two) times daily.  Dispense: 360 tablet; Refill: 3  -     gabapentin (NEURONTIN) 600 MG tablet; Take 1 tablet (600 mg total) by mouth 3 (three) times daily.  Dispense: 270 tablet; Refill: 1  -     ketorolac injection 60 mg  -     methylPREDNISolone acetate injection 160 mg  -     cyanocobalamin injection 1,000 mcg  -     T4, Free; Future; Expected date: 12/01/2023  -     TSH; Future; Expected date: 12/01/2023  -     Vitamin D; Future; Expected date: 12/01/2023  -     Cyclic Citrullinated Peptide Antibody, IgG; Future; Expected date: 12/01/2023  -     Rheumatoid Factor; Future; Expected date: 12/01/2023  -     Sedimentation rate; Future; Expected date: 12/01/2023  -     C-Reactive Protein; Future; Expected date: 12/01/2023  -     Comprehensive Metabolic Panel; Future; Expected date: 12/01/2023  -     CBC Auto Differential; Future; Expected date: 12/01/2023    Chronic pain syndrome  -     gabapentin (NEURONTIN) 600 MG tablet; Take 1 tablet (600 mg total) by mouth 3 (three) times daily.  Dispense: 270 tablet; Refill: 1  -     ketorolac injection 60 mg  -     methylPREDNISolone acetate injection 160 mg  -     cyanocobalamin injection 1,000 mcg  -     T4, Free; Future; Expected date: 12/01/2023  -     TSH; Future; Expected date: 12/01/2023  -     Vitamin D; Future; Expected date: 12/01/2023  -     Cyclic Citrullinated Peptide Antibody, IgG; Future; Expected date: 12/01/2023  -     Rheumatoid Factor; Future; Expected date: 12/01/2023  -     Sedimentation rate; Future; Expected date: 12/01/2023  -      C-Reactive Protein; Future; Expected date: 12/01/2023  -     Comprehensive Metabolic Panel; Future; Expected date: 12/01/2023  -     CBC Auto Differential; Future; Expected date: 12/01/2023    Neck pain, acute  -     gabapentin (NEURONTIN) 600 MG tablet; Take 1 tablet (600 mg total) by mouth 3 (three) times daily.  Dispense: 270 tablet; Refill: 1  -     ketorolac injection 60 mg  -     methylPREDNISolone acetate injection 160 mg  -     cyanocobalamin injection 1,000 mcg  -     T4, Free; Future; Expected date: 12/01/2023  -     TSH; Future; Expected date: 12/01/2023  -     Vitamin D; Future; Expected date: 12/01/2023  -     Cyclic Citrullinated Peptide Antibody, IgG; Future; Expected date: 12/01/2023  -     Rheumatoid Factor; Future; Expected date: 12/01/2023  -     Sedimentation rate; Future; Expected date: 12/01/2023  -     C-Reactive Protein; Future; Expected date: 12/01/2023  -     Comprehensive Metabolic Panel; Future; Expected date: 12/01/2023  -     CBC Auto Differential; Future; Expected date: 12/01/2023    Vitamin D deficiency  -     ergocalciferol (ERGOCALCIFEROL) 50,000 unit Cap; Take 1 capsule (50,000 Units total) by mouth every 7 days.  Dispense: 12 capsule; Refill: 1  -     ketorolac injection 60 mg  -     methylPREDNISolone acetate injection 160 mg  -     cyanocobalamin injection 1,000 mcg  -     T4, Free; Future; Expected date: 12/01/2023  -     TSH; Future; Expected date: 12/01/2023  -     Vitamin D; Future; Expected date: 12/01/2023  -     Cyclic Citrullinated Peptide Antibody, IgG; Future; Expected date: 12/01/2023  -     Rheumatoid Factor; Future; Expected date: 12/01/2023  -     Sedimentation rate; Future; Expected date: 12/01/2023  -     C-Reactive Protein; Future; Expected date: 12/01/2023  -     Comprehensive Metabolic Panel; Future; Expected date: 12/01/2023  -     CBC Auto Differential; Future; Expected date: 12/01/2023    Insomnia, unspecified type  -     amitriptyline (ELAVIL) 10 MG  tablet; Take 1 tablet (10 mg total) by mouth nightly as needed for Insomnia.  Dispense: 90 tablet; Refill: 2  -     ketorolac injection 60 mg  -     methylPREDNISolone acetate injection 160 mg  -     cyanocobalamin injection 1,000 mcg  -     T4, Free; Future; Expected date: 12/01/2023  -     TSH; Future; Expected date: 12/01/2023  -     Vitamin D; Future; Expected date: 12/01/2023  -     Cyclic Citrullinated Peptide Antibody, IgG; Future; Expected date: 12/01/2023  -     Rheumatoid Factor; Future; Expected date: 12/01/2023  -     Sedimentation rate; Future; Expected date: 12/01/2023  -     C-Reactive Protein; Future; Expected date: 12/01/2023  -     Comprehensive Metabolic Panel; Future; Expected date: 12/01/2023  -     CBC Auto Differential; Future; Expected date: 12/01/2023    Steroid-induced diabetes mellitus, sequela  -     semaglutide (OZEMPIC) 0.25 mg or 0.5 mg(2 mg/1.5 mL) pen injector; Inject 0.25 mg into the skin every 7 days.  Dispense: 0.75 mL; Refill: 11  -     semaglutide (OZEMPIC) 0.25 mg or 0.5 mg (2 mg/3 mL) pen injector; Inject 0.5 mg into the skin every 7 days.  Dispense: 3 mL; Refill: 5  -     ketorolac injection 60 mg  -     methylPREDNISolone acetate injection 160 mg  -     cyanocobalamin injection 1,000 mcg  -     T4, Free; Future; Expected date: 12/01/2023  -     TSH; Future; Expected date: 12/01/2023  -     Vitamin D; Future; Expected date: 12/01/2023  -     Cyclic Citrullinated Peptide Antibody, IgG; Future; Expected date: 12/01/2023  -     Rheumatoid Factor; Future; Expected date: 12/01/2023  -     Sedimentation rate; Future; Expected date: 12/01/2023  -     C-Reactive Protein; Future; Expected date: 12/01/2023  -     Comprehensive Metabolic Panel; Future; Expected date: 12/01/2023  -     CBC Auto Differential; Future; Expected date: 12/01/2023    Dry eyes  -     lifitegrast (XIIDRA) 5 % Dpet; Place 1 drop into both eyes 2 (two) times a day.  Dispense: 180 each; Refill: 1             Assessment:  58 year old female with  Seronegative RA on SSZ  --osteoarthritis  --chronic pain syndrome  --vitamin D deficiency    Plan:  1. toradol 60, depo 160, b12 today  2. Cont SSZ 1000 mg bid  3. Add ergocalciferol 50,000 once weekly  4. Cont. norco prn  I have checked [place] prescription monitoring program site and no unusual or abnormal behavior has occurred pt understand the risk and benefits of taking opioid medications and has decided to continue the medication. Pended x 3 months.  5. Cont gabapentin 600 mg tid  6. Cont elavil    More than 50% of the  40 minute encounter was spent face to face counseling the patient regarding current status and future plan of care as well as side effects  of the medications. All questions were answered to patient's satisfaction also includes  non-face to face time preparing to see the patient (eg, review of tests), Obtaining and/or reviewing separately obtained history, Documenting clinical information in the electronic or other health record, Independently interpreting results

## 2023-12-01 NOTE — ASSESSMENT & PLAN NOTE
Abnormal myocardial perfusion scan.    There are two significant perfusion abnormalities.    Perfusion Abnormality #1 - There is a moderate intensity, moderate sized, reversible perfusion abnormality that is consistent with ischemia in the basal to apical inferior wall(s).    Perfusion Abnormality #2 - There is a small to moderate sized, moderate to severe intensity, mostly fixed perfusion abnormality with some reversibilty in the mid to apical anteroapical wall(s).    There are no other significant perfusion abnormalities.    There is a moderate to severe intensity perfusion abnormality in the  wall of the left ventricle, secondary to breast attenuation.    There is a  moderate to severe intensity fixed perfusion abnormality in the  wall of the left ventricle secondary to diaphragm attenuation.    The gated perfusion images showed an ejection fraction of 65% at rest. The gated perfusion images showed an ejection fraction of 71% post stress. Normal ejection fraction is greater than 53%.    There is normal wall motion at rest and post stress.    LV cavity size is normal at rest and normal at stress.    The ECG portion of the study is negative for ischemia.    The patient reported no chest pain during the stress test.    There were no arrhythmias during stress.    A PET stress exam is recommended if clinically indicated.         -No anginal symptoms.   Cardiac CTA

## 2024-01-02 DIAGNOSIS — M06.00 SERONEGATIVE RHEUMATOID ARTHRITIS: ICD-10-CM

## 2024-01-02 DIAGNOSIS — Z79.899 IMMUNOCOMPROMISED STATE DUE TO DRUG THERAPY: ICD-10-CM

## 2024-01-02 DIAGNOSIS — D84.821 IMMUNOCOMPROMISED STATE DUE TO DRUG THERAPY: ICD-10-CM

## 2024-01-02 DIAGNOSIS — G89.4 CHRONIC PAIN SYNDROME: ICD-10-CM

## 2024-01-02 DIAGNOSIS — M54.2 NECK PAIN, ACUTE: ICD-10-CM

## 2024-01-03 ENCOUNTER — PATIENT MESSAGE (OUTPATIENT)
Dept: RHEUMATOLOGY | Facility: CLINIC | Age: 59
End: 2024-01-03
Payer: COMMERCIAL

## 2024-01-03 RX ORDER — HYDROCODONE BITARTRATE AND ACETAMINOPHEN 10; 325 MG/1; MG/1
1 TABLET ORAL EVERY 8 HOURS PRN
Qty: 90 TABLET | Refills: 0 | Status: SHIPPED | OUTPATIENT
Start: 2024-01-03 | End: 2024-01-04 | Stop reason: SDUPTHER

## 2024-01-04 DIAGNOSIS — M54.2 NECK PAIN, ACUTE: ICD-10-CM

## 2024-01-04 DIAGNOSIS — Z79.899 IMMUNOCOMPROMISED STATE DUE TO DRUG THERAPY: ICD-10-CM

## 2024-01-04 DIAGNOSIS — G89.4 CHRONIC PAIN SYNDROME: ICD-10-CM

## 2024-01-04 DIAGNOSIS — D84.821 IMMUNOCOMPROMISED STATE DUE TO DRUG THERAPY: ICD-10-CM

## 2024-01-04 DIAGNOSIS — M06.00 SERONEGATIVE RHEUMATOID ARTHRITIS: ICD-10-CM

## 2024-01-07 RX ORDER — HYDROCODONE BITARTRATE AND ACETAMINOPHEN 10; 325 MG/1; MG/1
1 TABLET ORAL EVERY 8 HOURS PRN
Qty: 90 TABLET | Refills: 0 | Status: SHIPPED | OUTPATIENT
Start: 2024-01-07 | End: 2024-02-06

## 2024-01-18 DIAGNOSIS — T78.40XS ALLERGY, SEQUELA: Primary | ICD-10-CM

## 2024-01-19 RX ORDER — MONTELUKAST SODIUM 10 MG/1
10 TABLET ORAL NIGHTLY
Qty: 30 TABLET | Refills: 11 | Status: SHIPPED | OUTPATIENT
Start: 2024-01-19 | End: 2024-03-01 | Stop reason: SDUPTHER

## 2024-01-31 DIAGNOSIS — D84.821 IMMUNOCOMPROMISED STATE DUE TO DRUG THERAPY: ICD-10-CM

## 2024-01-31 DIAGNOSIS — M54.2 NECK PAIN, ACUTE: ICD-10-CM

## 2024-01-31 DIAGNOSIS — Z79.899 IMMUNOCOMPROMISED STATE DUE TO DRUG THERAPY: ICD-10-CM

## 2024-01-31 DIAGNOSIS — M06.00 SERONEGATIVE RHEUMATOID ARTHRITIS: ICD-10-CM

## 2024-01-31 DIAGNOSIS — G89.4 CHRONIC PAIN SYNDROME: ICD-10-CM

## 2024-01-31 RX ORDER — HYDROCODONE BITARTRATE AND ACETAMINOPHEN 10; 325 MG/1; MG/1
1 TABLET ORAL EVERY 8 HOURS PRN
Qty: 90 TABLET | Refills: 0 | Status: CANCELLED | OUTPATIENT
Start: 2024-01-31 | End: 2024-03-01

## 2024-02-01 NOTE — TELEPHONE ENCOUNTER
----- Message from Oj Logan sent at 2/1/2024  1:24 PM CST -----  Type:  RX Refill Request    Who Called:  pt  Refill or New Rx:  REFILL  RX Name and Strength:  HYDROcodone-acetaminophen (NORCO)  mg per tablet  How is the patient currently taking it? (ex. 1XDay):  as directed  Is this a 30 day or 90 day RX:  30  Preferred Pharmacy with phone number:    [facility] [address] [facility] [address] [place] [address]  Phone: [phone] Fax: [phone]  Local or Mail Order:  local  Ordering Provider:  Vania Palacios Call Back Number:  [phone]  Additional Information:  Please call back to advise. Thanks!

## 2024-02-05 RX ORDER — HYDROCODONE BITARTRATE AND ACETAMINOPHEN 10; 325 MG/1; MG/1
1 TABLET ORAL EVERY 8 HOURS PRN
Qty: 90 TABLET | Refills: 0 | Status: SHIPPED | OUTPATIENT
Start: 2024-02-05 | End: 2024-03-01

## 2024-02-23 ENCOUNTER — LAB VISIT (OUTPATIENT)
Dept: LAB | Facility: HOSPITAL | Age: 59
End: 2024-02-23
Attending: INTERNAL MEDICINE
Payer: COMMERCIAL

## 2024-02-23 DIAGNOSIS — G47.00 INSOMNIA, UNSPECIFIED TYPE: ICD-10-CM

## 2024-02-23 DIAGNOSIS — T38.0X5S STEROID-INDUCED DIABETES MELLITUS, SEQUELA: ICD-10-CM

## 2024-02-23 DIAGNOSIS — M06.00 SERONEGATIVE RHEUMATOID ARTHRITIS: ICD-10-CM

## 2024-02-23 DIAGNOSIS — E55.9 VITAMIN D DEFICIENCY: ICD-10-CM

## 2024-02-23 DIAGNOSIS — E09.9 STEROID-INDUCED DIABETES MELLITUS, SEQUELA: ICD-10-CM

## 2024-02-23 DIAGNOSIS — E11.69 TYPE 2 DIABETES MELLITUS WITH OTHER SPECIFIED COMPLICATION, UNSPECIFIED WHETHER LONG TERM INSULIN USE: ICD-10-CM

## 2024-02-23 DIAGNOSIS — M54.2 NECK PAIN, ACUTE: ICD-10-CM

## 2024-02-23 DIAGNOSIS — G89.4 CHRONIC PAIN SYNDROME: ICD-10-CM

## 2024-02-23 LAB
25(OH)D3+25(OH)D2 SERPL-MCNC: 52 NG/ML (ref 30–96)
ALBUMIN SERPL BCP-MCNC: 4.3 G/DL (ref 3.5–5.2)
ALP SERPL-CCNC: 55 U/L (ref 55–135)
ALT SERPL W/O P-5'-P-CCNC: 17 U/L (ref 10–44)
ANION GAP SERPL CALC-SCNC: 6 MMOL/L (ref 8–16)
AST SERPL-CCNC: 16 U/L (ref 10–40)
BASOPHILS # BLD AUTO: 0.02 K/UL (ref 0–0.2)
BASOPHILS NFR BLD: 0.4 % (ref 0–1.9)
BILIRUB SERPL-MCNC: 0.4 MG/DL (ref 0.1–1)
BUN SERPL-MCNC: 16 MG/DL (ref 6–20)
CALCIUM SERPL-MCNC: 10.1 MG/DL (ref 8.7–10.5)
CHLORIDE SERPL-SCNC: 102 MMOL/L (ref 95–110)
CO2 SERPL-SCNC: 34 MMOL/L (ref 23–29)
CREAT SERPL-MCNC: 0.8 MG/DL (ref 0.5–1.4)
CRP SERPL-MCNC: 6.8 MG/L (ref 0–8.2)
DIFFERENTIAL METHOD BLD: ABNORMAL
EOSINOPHIL # BLD AUTO: 0.1 K/UL (ref 0–0.5)
EOSINOPHIL NFR BLD: 1.7 % (ref 0–8)
ERYTHROCYTE [DISTWIDTH] IN BLOOD BY AUTOMATED COUNT: 13.9 % (ref 11.5–14.5)
ERYTHROCYTE [SEDIMENTATION RATE] IN BLOOD BY WESTERGREN METHOD: 10 MM/HR (ref 0–20)
EST. GFR  (NO RACE VARIABLE): >60 ML/MIN/1.73 M^2
GLUCOSE SERPL-MCNC: 100 MG/DL (ref 70–110)
HCT VFR BLD AUTO: 40.7 % (ref 37–48.5)
HGB BLD-MCNC: 13 G/DL (ref 12–16)
IMM GRANULOCYTES # BLD AUTO: 0.01 K/UL (ref 0–0.04)
IMM GRANULOCYTES NFR BLD AUTO: 0.2 % (ref 0–0.5)
LYMPHOCYTES # BLD AUTO: 2.3 K/UL (ref 1–4.8)
LYMPHOCYTES NFR BLD: 44 % (ref 18–48)
MCH RBC QN AUTO: 27.9 PG (ref 27–31)
MCHC RBC AUTO-ENTMCNC: 31.9 G/DL (ref 32–36)
MCV RBC AUTO: 87 FL (ref 82–98)
MONOCYTES # BLD AUTO: 0.5 K/UL (ref 0.3–1)
MONOCYTES NFR BLD: 8.9 % (ref 4–15)
NEUTROPHILS # BLD AUTO: 2.4 K/UL (ref 1.8–7.7)
NEUTROPHILS NFR BLD: 44.8 % (ref 38–73)
NRBC BLD-RTO: 0 /100 WBC
PLATELET # BLD AUTO: 330 K/UL (ref 150–450)
PMV BLD AUTO: 9.9 FL (ref 9.2–12.9)
POTASSIUM SERPL-SCNC: 4 MMOL/L (ref 3.5–5.1)
PROT SERPL-MCNC: 7.2 G/DL (ref 6–8.4)
RBC # BLD AUTO: 4.66 M/UL (ref 4–5.4)
RHEUMATOID FACT SERPL-ACNC: <10 IU/ML (ref 0–15)
SODIUM SERPL-SCNC: 142 MMOL/L (ref 136–145)
T4 FREE SERPL-MCNC: 0.69 NG/DL (ref 0.71–1.51)
TSH SERPL DL<=0.005 MIU/L-ACNC: 1.64 UIU/ML (ref 0.34–5.6)
WBC # BLD AUTO: 5.3 K/UL (ref 3.9–12.7)

## 2024-02-23 PROCEDURE — 85025 COMPLETE CBC W/AUTO DIFF WBC: CPT | Performed by: INTERNAL MEDICINE

## 2024-02-23 PROCEDURE — 84443 ASSAY THYROID STIM HORMONE: CPT | Performed by: INTERNAL MEDICINE

## 2024-02-23 PROCEDURE — 80053 COMPREHEN METABOLIC PANEL: CPT | Performed by: INTERNAL MEDICINE

## 2024-02-23 PROCEDURE — 86200 CCP ANTIBODY: CPT | Performed by: INTERNAL MEDICINE

## 2024-02-23 PROCEDURE — 36415 COLL VENOUS BLD VENIPUNCTURE: CPT | Performed by: INTERNAL MEDICINE

## 2024-02-23 PROCEDURE — 86140 C-REACTIVE PROTEIN: CPT | Performed by: INTERNAL MEDICINE

## 2024-02-23 PROCEDURE — 85651 RBC SED RATE NONAUTOMATED: CPT | Performed by: INTERNAL MEDICINE

## 2024-02-23 PROCEDURE — 86431 RHEUMATOID FACTOR QUANT: CPT | Performed by: INTERNAL MEDICINE

## 2024-02-23 PROCEDURE — 84439 ASSAY OF FREE THYROXINE: CPT | Performed by: INTERNAL MEDICINE

## 2024-02-23 PROCEDURE — 82306 VITAMIN D 25 HYDROXY: CPT | Performed by: INTERNAL MEDICINE

## 2024-02-25 LAB — CCP IGA+IGG SERPL IA-ACNC: 1 UNITS (ref 0–19)

## 2024-03-01 ENCOUNTER — OFFICE VISIT (OUTPATIENT)
Dept: RHEUMATOLOGY | Facility: CLINIC | Age: 59
End: 2024-03-01
Payer: COMMERCIAL

## 2024-03-01 VITALS
DIASTOLIC BLOOD PRESSURE: 89 MMHG | HEART RATE: 92 BPM | SYSTOLIC BLOOD PRESSURE: 139 MMHG | BODY MASS INDEX: 32.1 KG/M2 | HEIGHT: 64 IN

## 2024-03-01 DIAGNOSIS — G89.4 CHRONIC PAIN SYNDROME: ICD-10-CM

## 2024-03-01 DIAGNOSIS — M06.00 SERONEGATIVE RHEUMATOID ARTHRITIS: Primary | ICD-10-CM

## 2024-03-01 DIAGNOSIS — Z79.899 ON SULFASALAZINE THERAPY: ICD-10-CM

## 2024-03-01 DIAGNOSIS — J30.2 SEASONAL ALLERGIES: ICD-10-CM

## 2024-03-01 PROCEDURE — 99999 PR PBB SHADOW E&M-EST. PATIENT-LVL IV: CPT | Mod: PBBFAC,,, | Performed by: PHYSICIAN ASSISTANT

## 2024-03-01 PROCEDURE — 96372 THER/PROPH/DIAG INJ SC/IM: CPT | Mod: S$GLB,,, | Performed by: PHYSICIAN ASSISTANT

## 2024-03-01 PROCEDURE — 99214 OFFICE O/P EST MOD 30 MIN: CPT | Mod: 25,S$GLB,, | Performed by: PHYSICIAN ASSISTANT

## 2024-03-01 PROCEDURE — 3075F SYST BP GE 130 - 139MM HG: CPT | Mod: CPTII,S$GLB,, | Performed by: PHYSICIAN ASSISTANT

## 2024-03-01 PROCEDURE — 3008F BODY MASS INDEX DOCD: CPT | Mod: CPTII,S$GLB,, | Performed by: PHYSICIAN ASSISTANT

## 2024-03-01 PROCEDURE — 1160F RVW MEDS BY RX/DR IN RCRD: CPT | Mod: CPTII,S$GLB,, | Performed by: PHYSICIAN ASSISTANT

## 2024-03-01 PROCEDURE — 1159F MED LIST DOCD IN RCRD: CPT | Mod: CPTII,S$GLB,, | Performed by: PHYSICIAN ASSISTANT

## 2024-03-01 PROCEDURE — 3079F DIAST BP 80-89 MM HG: CPT | Mod: CPTII,S$GLB,, | Performed by: PHYSICIAN ASSISTANT

## 2024-03-01 PROCEDURE — 4010F ACE/ARB THERAPY RXD/TAKEN: CPT | Mod: CPTII,S$GLB,, | Performed by: PHYSICIAN ASSISTANT

## 2024-03-01 RX ORDER — MONTELUKAST SODIUM 10 MG/1
10 TABLET ORAL NIGHTLY
Qty: 90 TABLET | Refills: 3 | Status: SHIPPED | OUTPATIENT
Start: 2024-03-01

## 2024-03-01 RX ORDER — METRONIDAZOLE 500 MG/1
500 TABLET ORAL EVERY 12 HOURS
COMMUNITY
Start: 2023-12-13 | End: 2024-04-03

## 2024-03-01 RX ORDER — KETOROLAC TROMETHAMINE 30 MG/ML
60 INJECTION, SOLUTION INTRAMUSCULAR; INTRAVENOUS
Status: COMPLETED | OUTPATIENT
Start: 2024-03-01 | End: 2024-03-01

## 2024-03-01 RX ORDER — CYANOCOBALAMIN 1000 UG/ML
1000 INJECTION, SOLUTION INTRAMUSCULAR; SUBCUTANEOUS
Status: COMPLETED | OUTPATIENT
Start: 2024-03-01 | End: 2024-03-01

## 2024-03-01 RX ORDER — AMLODIPINE BESYLATE 5 MG/1
5 TABLET ORAL 2 TIMES DAILY
COMMUNITY
Start: 2024-02-27 | End: 2024-04-03

## 2024-03-01 RX ORDER — METHYLPREDNISOLONE ACETATE 80 MG/ML
160 INJECTION, SUSPENSION INTRA-ARTICULAR; INTRALESIONAL; INTRAMUSCULAR; SOFT TISSUE
Status: COMPLETED | OUTPATIENT
Start: 2024-03-01 | End: 2024-03-01

## 2024-03-01 RX ORDER — AZELASTINE 1 MG/ML
1 SPRAY, METERED NASAL 2 TIMES DAILY
Qty: 30 ML | Refills: 3 | Status: SHIPPED | OUTPATIENT
Start: 2024-03-01 | End: 2025-03-01

## 2024-03-01 RX ORDER — PREDNISONE 2.5 MG/1
2.5 TABLET ORAL DAILY
Qty: 90 TABLET | Refills: 2 | Status: SHIPPED | OUTPATIENT
Start: 2024-03-01

## 2024-03-01 RX ORDER — FLUCONAZOLE 150 MG/1
150 TABLET ORAL DAILY
COMMUNITY
Start: 2023-12-27

## 2024-03-01 RX ORDER — CETIRIZINE HYDROCHLORIDE 10 MG/1
10 TABLET ORAL DAILY
Qty: 90 TABLET | Refills: 3 | Status: SHIPPED | OUTPATIENT
Start: 2024-03-01

## 2024-03-01 RX ADMIN — METHYLPREDNISOLONE ACETATE 160 MG: 80 INJECTION, SUSPENSION INTRA-ARTICULAR; INTRALESIONAL; INTRAMUSCULAR; SOFT TISSUE at 02:03

## 2024-03-01 RX ADMIN — KETOROLAC TROMETHAMINE 60 MG: 30 INJECTION, SOLUTION INTRAMUSCULAR; INTRAVENOUS at 02:03

## 2024-03-01 RX ADMIN — CYANOCOBALAMIN 1000 MCG: 1000 INJECTION, SOLUTION INTRAMUSCULAR; SUBCUTANEOUS at 01:03

## 2024-03-01 ASSESSMENT — ROUTINE ASSESSMENT OF PATIENT INDEX DATA (RAPID3)
PAIN SCORE: 7
PATIENT GLOBAL ASSESSMENT SCORE: 7
MDHAQ FUNCTION SCORE: 1.1
TOTAL RAPID3 SCORE: 5.89
FATIGUE SCORE: 1.1
PSYCHOLOGICAL DISTRESS SCORE: 2.2

## 2024-03-01 NOTE — PROGRESS NOTES
Subjective:       Patient ID: Susana Andersen is a 58 y.o. female.    Chief Complaint: Disease Management      Mrs. Andersen is a 58 year old female who presents to clinic for follow up on seronegative rheumatoid arthritis. She has been compliant with SSZ 1000 mg bid. She is taking prednisone 2.5 mg 2-3 days a week as needed for pain.  She continues to have pain and swelling in her hands R>L and wrist. She is prescribed norco for severe pain, which is not helping much. She is not ready to consider more aggressive treatment for RA.    We reviewed her recent labs ESR/CRP normal.     Current tx:  1. SSZ  2. Norco  3. Gabapentin        Review of Systems   Constitutional:  Positive for activity change. Negative for appetite change, chills, fatigue and fever.   Eyes:  Negative for visual disturbance.   Respiratory:  Negative for cough and shortness of breath.    Cardiovascular:  Negative for chest pain, palpitations and leg swelling.   Gastrointestinal:  Negative for abdominal pain, constipation, diarrhea, nausea and vomiting.   Genitourinary:  Negative for dysuria.   Musculoskeletal:  Positive for arthralgias, joint swelling and myalgias.   Allergic/Immunologic: Negative for immunocompromised state.   Neurological:  Negative for dizziness, weakness, light-headedness and headaches.         Objective:     Vitals:    03/01/24 1252   BP: 139/89   Pulse: 92         Past Medical History:   Diagnosis Date    Allergy     Arthritis     Colon polyps 07/29/2021    Hypertension      Past Surgical History:   Procedure Laterality Date    COLONOSCOPY N/A 7/29/2021    Procedure: COLONOSCOPY;  Surgeon: Edgar Bolaños MD;  Location: [facility];  Service: Endoscopy;  Laterality: N/A;    COLONOSCOPY W/ BIOPSIES            Physical Exam   Neck: No JVD present. No thyromegaly present.   Cardiovascular: Normal rate and regular rhythm. Exam reveals no decreased pulses.   Pulmonary/Chest: Effort normal.   Musculoskeletal:      Right shoulder:  Normal.      Left shoulder: Normal.      Right elbow: Normal.      Left elbow: Normal.      Right wrist: Swelling and tenderness present.      Left wrist: Swelling and tenderness present.      Right knee: Tenderness present.      Left knee: Tenderness present.   Lymphadenopathy:     She has no cervical adenopathy.   Neurological: Gait normal.   Skin: No rash noted.   Psychiatric: Mood and affect normal.       Right Side Rheumatological Exam     Examination finds the shoulder, elbow, 1st MCP, 4th MCP and 5th MCP normal.    The patient is tender to palpation of the wrist, knee, 1st PIP, 2nd PIP, 2nd MCP, 3rd PIP, 3rd MCP, 4th PIP and 5th PIP    She has swelling of the wrist, 1st PIP, 2nd PIP, 2nd MCP, 3rd PIP, 3rd MCP, 4th PIP and 5th PIP    Left Side Rheumatological Exam     Examination finds the shoulder, elbow, 1st PIP, 1st MCP, 2nd PIP, 2nd MCP, 3rd PIP, 3rd MCP, 4th PIP, 4th MCP, 5th PIP and 5th MCP normal.    The patient is tender to palpation of the wrist and knee.    She has swelling of the wrist            Labs reviewed:  Component      Latest Ref [facility] 2/23/2024   WBC      3.90 - 12.70 K/uL 5.30    RBC      4.00 - 5.40 M/uL 4.66    Hemoglobin      12.0 - 16.0 g/dL 13.0    Hematocrit      37.0 - 48.5 % 40.7    MCV      82 - 98 fL 87    MCH      27.0 - 31.0 pg 27.9    MCHC      32.0 - 36.0 g/dL 31.9 (L)    RDW      11.5 - 14.5 % 13.9    Platelet Count      150 - 450 K/uL 330    MPV      9.2 - 12.9 fL 9.9    Immature Granulocytes      0.0 - 0.5 % 0.2    Gran # (ANC)      1.8 - 7.7 K/uL 2.4    Immature Grans (Abs)      0.00 - 0.04 K/uL 0.01    Lymph #      1.0 - 4.8 K/uL 2.3    Mono #      0.3 - 1.0 K/uL 0.5    Eos #      0.0 - 0.5 K/uL 0.1    Baso #      0.00 - 0.20 K/uL 0.02    nRBC      0 /100 WBC 0    Gran %      38.0 - 73.0 % 44.8    Lymph %      18.0 - 48.0 % 44.0    Mono %      4.0 - 15.0 % 8.9    Eos %      0.0 - 8.0 % 1.7    Basophil %      0.0 - 1.9 % 0.4    Differential Method Automated    Sodium       136 - 145 mmol/L 142    Potassium      3.5 - 5.1 mmol/L 4.0    Chloride      95 - 110 mmol/L 102    CO2      23 - 29 mmol/L 34 (H)    Glucose      70 - 110 mg/dL 100    BUN      6 - 20 mg/dL 16    Creatinine      0.5 - 1.4 mg/dL 0.8    Calcium      8.7 - 10.5 mg/dL 10.1    PROTEIN TOTAL      6.0 - 8.4 g/dL 7.2    Albumin      3.5 - 5.2 g/dL 4.3    BILIRUBIN TOTAL      0.1 - 1.0 mg/dL 0.4    ALP      55 - 135 U/L 55    AST      10 - 40 U/L 16    ALT      10 - 44 U/L 17    eGFR      >60 mL/min/1.73 m^2 >60.0    Anion Gap      8 - 16 mmol/L 6 (L)    Free T4      0.71 - 1.51 ng/dL 0.69 (L)    TSH      0.340 - 5.600 uIU/mL 1.642    Vitamin D      30 - 96 ng/mL 52    CCP Antibodies      0 - 19 units 1    Rheumatoid Factor      0.0 - 15.0 IU/mL <10.0    Sed Rate      0 - 20 mm/Hr 10    CRP      0.0 - 8.2 mg/L 6.8       Legend:  (L) Low  (H) High    Assessment:       1. Seronegative rheumatoid arthritis    2. Seasonal allergies    3. Chronic pain syndrome    4. On sulfasalazine therapy              Plan:       Seronegative rheumatoid arthritis  -     predniSONE (DELTASONE) 2.5 MG tablet; Take 1 tablet (2.5 mg total) by mouth once daily.  Dispense: 90 tablet; Refill: 2  -     ketorolac injection 60 mg  -     methylPREDNISolone acetate injection 160 mg  -     cyanocobalamin injection 1,000 mcg  -     cyanocobalamin injection 1,000 mcg  -     CBC Auto Differential; Future; Expected date: 03/01/2024  -     Comprehensive Metabolic Panel; Future; Expected date: 03/01/2024  -     C-Reactive Protein; Future; Expected date: 03/01/2024  -     Sedimentation rate; Future; Expected date: 03/01/2024    Seasonal allergies  -     cetirizine (ZYRTEC) 10 MG tablet; Take 1 tablet (10 mg total) by mouth once daily.  Dispense: 90 tablet; Refill: 3  -     montelukast (SINGULAIR) 10 mg tablet; Take 1 tablet (10 mg total) by mouth every evening.  Dispense: 90 tablet; Refill: 3  -     azelastine (ASTELIN) 137 mcg (0.1 %) nasal spray; Use 1 spray  (137 mcg total) by Nasal route 2 (two) times daily.  Dispense: 30 mL; Refill: 3    Chronic pain syndrome    On sulfasalazine therapy            Assessment:  58 year old female with  Seronegative RA on SSZ  --osteoarthritis  --chronic pain syndrome  --vitamin D deficiency    Plan:  1. toradol 60, depo 160, b12 today  2. Cont SSZ 1000 mg bid  3. Cont ergocalciferol 50,000 once weekly then change to otc once rx is complete  4. D/c norco. Start percocet prn per MD.  I have checked [place] prescription monitoring program site and no unusual or abnormal behavior has occurred pt understand the risk and benefits of taking opioid medications and has decided to continue the medication. Pended x 3 months.  5. Cont gabapentin 600 mg tid  6. Cont elavil    Follow up:  4 mo Dr. Caro w/labs prior

## 2024-03-04 RX ORDER — OXYCODONE AND ACETAMINOPHEN 7.5; 325 MG/1; MG/1
1 TABLET ORAL EVERY 8 HOURS PRN
Qty: 90 TABLET | Refills: 0 | Status: SHIPPED | OUTPATIENT
Start: 2024-03-06

## 2024-03-04 RX ORDER — OXYCODONE AND ACETAMINOPHEN 7.5; 325 MG/1; MG/1
1 TABLET ORAL EVERY 8 HOURS PRN
Qty: 90 TABLET | Refills: 0 | Status: SHIPPED | OUTPATIENT
Start: 2024-04-05

## 2024-03-04 RX ORDER — OXYCODONE AND ACETAMINOPHEN 7.5; 325 MG/1; MG/1
1 TABLET ORAL EVERY 8 HOURS PRN
Qty: 90 TABLET | Refills: 0 | Status: SHIPPED | OUTPATIENT
Start: 2024-05-06 | End: 2024-05-30 | Stop reason: SDUPTHER

## 2024-03-05 ENCOUNTER — TELEPHONE (OUTPATIENT)
Dept: RHEUMATOLOGY | Facility: CLINIC | Age: 59
End: 2024-03-05
Payer: COMMERCIAL

## 2024-03-05 NOTE — TELEPHONE ENCOUNTER
Called patient no answer  ----- Message from Amanda Mata LPN sent at 3/4/2024  4:08 PM CST -----  Contact: Patient    ----- Message -----  From: Caroline Sena  Sent: 3/4/2024   3:00 PM CST  To: Vania Dallas Staff    Type:  Patient Returning Call    Who Called:Patient     Who Left Message for Patient:Alisa     Does the patient know what this is regarding?:no    Would the patient rather a call back or a response via [facility]? Call    Best Call Back Number:[phone] ([place])     Additional Information: Please return call

## 2024-03-06 ENCOUNTER — OFFICE VISIT (OUTPATIENT)
Dept: URGENT CARE | Facility: CLINIC | Age: 59
End: 2024-03-06
Payer: COMMERCIAL

## 2024-03-06 VITALS
SYSTOLIC BLOOD PRESSURE: 125 MMHG | WEIGHT: 187 LBS | OXYGEN SATURATION: 98 % | HEART RATE: 99 BPM | TEMPERATURE: 98 F | DIASTOLIC BLOOD PRESSURE: 85 MMHG | HEIGHT: 64 IN | BODY MASS INDEX: 31.92 KG/M2 | RESPIRATION RATE: 16 BRPM

## 2024-03-06 DIAGNOSIS — J01.41 ACUTE RECURRENT PANSINUSITIS: ICD-10-CM

## 2024-03-06 DIAGNOSIS — H66.91 ACUTE OTITIS MEDIA, RIGHT: Primary | ICD-10-CM

## 2024-03-06 PROCEDURE — 1159F MED LIST DOCD IN RCRD: CPT | Mod: S$GLB,,, | Performed by: STUDENT IN AN ORGANIZED HEALTH CARE EDUCATION/TRAINING PROGRAM

## 2024-03-06 PROCEDURE — 3079F DIAST BP 80-89 MM HG: CPT | Mod: S$GLB,,, | Performed by: STUDENT IN AN ORGANIZED HEALTH CARE EDUCATION/TRAINING PROGRAM

## 2024-03-06 PROCEDURE — 96372 THER/PROPH/DIAG INJ SC/IM: CPT | Mod: S$GLB,,, | Performed by: STUDENT IN AN ORGANIZED HEALTH CARE EDUCATION/TRAINING PROGRAM

## 2024-03-06 PROCEDURE — 99214 OFFICE O/P EST MOD 30 MIN: CPT | Mod: 25,S$GLB,, | Performed by: STUDENT IN AN ORGANIZED HEALTH CARE EDUCATION/TRAINING PROGRAM

## 2024-03-06 PROCEDURE — 3008F BODY MASS INDEX DOCD: CPT | Mod: S$GLB,,, | Performed by: STUDENT IN AN ORGANIZED HEALTH CARE EDUCATION/TRAINING PROGRAM

## 2024-03-06 PROCEDURE — 1160F RVW MEDS BY RX/DR IN RCRD: CPT | Mod: S$GLB,,, | Performed by: STUDENT IN AN ORGANIZED HEALTH CARE EDUCATION/TRAINING PROGRAM

## 2024-03-06 PROCEDURE — 3074F SYST BP LT 130 MM HG: CPT | Mod: S$GLB,,, | Performed by: STUDENT IN AN ORGANIZED HEALTH CARE EDUCATION/TRAINING PROGRAM

## 2024-03-06 PROCEDURE — 4010F ACE/ARB THERAPY RXD/TAKEN: CPT | Mod: S$GLB,,, | Performed by: STUDENT IN AN ORGANIZED HEALTH CARE EDUCATION/TRAINING PROGRAM

## 2024-03-06 RX ORDER — DEXAMETHASONE SODIUM PHOSPHATE 100 MG/10ML
5 INJECTION INTRAMUSCULAR; INTRAVENOUS
Status: COMPLETED | OUTPATIENT
Start: 2024-03-06 | End: 2024-03-06

## 2024-03-06 RX ORDER — ATORVASTATIN CALCIUM 20 MG/1
20 TABLET, FILM COATED ORAL
COMMUNITY
Start: 2024-02-27 | End: 2024-04-03

## 2024-03-06 RX ORDER — BENZONATATE 200 MG/1
200 CAPSULE ORAL 3 TIMES DAILY PRN
Qty: 30 CAPSULE | Refills: 0 | Status: SHIPPED | OUTPATIENT
Start: 2024-03-06 | End: 2024-03-16

## 2024-03-06 RX ORDER — AMOXICILLIN AND CLAVULANATE POTASSIUM 875; 125 MG/1; MG/1
1 TABLET, FILM COATED ORAL EVERY 12 HOURS
Qty: 20 TABLET | Refills: 0 | Status: SHIPPED | OUTPATIENT
Start: 2024-03-06 | End: 2024-03-16

## 2024-03-06 RX ADMIN — DEXAMETHASONE SODIUM PHOSPHATE 5 MG: 100 INJECTION INTRAMUSCULAR; INTRAVENOUS at 04:03

## 2024-03-06 NOTE — PROGRESS NOTES
"Subjective:      Patient ID: Susana Andersen is a 58 y.o. female.    Vitals:  height is 5' 4" (1.626 m) and weight is 84.8 kg (187 lb). Her oral temperature is 98.2 °F (36.8 °C). Her blood pressure is 125/85 and her pulse is 99. Her respiration is 16 and oxygen saturation is 98%.     Chief Complaint: Sinus Problem, Headache, and Otalgia    Patient is a 58-year-old female with a past medical history of hypertension, hyperlipidemia, type 2 diabetes, insomnia, and rheumatoid arthritis who presents to clinic for evaluation of ear pain and sinus related issues.  Patient reports 1.5 weeks.  Patient reports she is vaccinated.  Patient reports no recent or known sick exposures.  Patient reports over-the-counter medications with no significant relief to symptoms.  Patient requesting Tessalon Perles and steroid injection.    Sinus Problem  This is a new problem. The current episode started 1 to 4 weeks ago (1 1/2 week). The problem is unchanged. Associated symptoms include congestion, coughing, ear pain (Right ear), headaches and sinus pressure. Pertinent negatives include no chills, diaphoresis, shortness of breath or sore throat.   Headache   This is a new problem. The current episode started 1 to 4 weeks ago (1 1/2 week). The problem has been unchanged. Associated symptoms include coughing, ear pain (Right ear), hearing loss (Muffled hearing right ear) and sinus pressure. Pertinent negatives include no abdominal pain, dizziness, fever, nausea, sore throat, tinnitus or vomiting.   Otalgia   There is pain in both ears. This is a new problem. The current episode started 1 to 4 weeks ago. Associated symptoms include coughing, headaches and hearing loss (Muffled hearing right ear). Pertinent negatives include no abdominal pain, ear discharge, rash, sore throat or vomiting.       Constitution: Negative. Negative for chills, sweating, fatigue and fever.   HENT:  Positive for ear pain (Right ear), hearing loss (Muffled hearing " right ear), congestion, postnasal drip and sinus pressure. Negative for ear discharge, tinnitus and sore throat.    Neck: neck negative.   Cardiovascular: Negative.  Negative for chest pain and palpitations.   Eyes: Negative.    Respiratory:  Positive for cough and sputum production. Negative for chest tightness, shortness of breath and wheezing.    Gastrointestinal: Negative.  Negative for abdominal pain, nausea and vomiting.   Endocrine: negative.   Genitourinary: Negative.  Negative for dysuria, frequency and urgency.   Musculoskeletal: Negative.  Negative for muscle ache.   Skin: Negative.  Negative for color change, pale, rash and erythema.   Allergic/Immunologic: Negative.    Neurological:  Positive for headaches. Negative for dizziness, light-headedness, passing out, disorientation and altered mental status.   Hematologic/Lymphatic: Negative.    Psychiatric/Behavioral: Negative.  Negative for altered mental status, disorientation and confusion.       Objective:     Physical Exam   Constitutional: She is oriented to person, place, and time. She appears well-developed. She is cooperative.  Non-toxic appearance. She does not appear ill. No distress.   HENT:   Head: Normocephalic and atraumatic.   Ears:   Right Ear: External ear and ear canal normal. No no drainage, swelling or tenderness. Tympanic membrane is erythematous and bulging. Tympanic membrane is not perforated. Decreased hearing is noted.   Left Ear: Hearing, tympanic membrane, external ear and ear canal normal.   Nose: Congestion present. No mucosal edema, rhinorrhea or nasal deformity. No epistaxis. Right sinus exhibits maxillary sinus tenderness and frontal sinus tenderness. Left sinus exhibits maxillary sinus tenderness and frontal sinus tenderness.   Mouth/Throat: Uvula is midline, oropharynx is clear and moist and mucous membranes are normal. Mucous membranes are moist. No trismus in the jaw. Normal dentition. No uvula swelling. No oropharyngeal  exudate or posterior oropharyngeal erythema. Oropharynx is clear.   Eyes: Conjunctivae and lids are normal. Pupils are equal, round, and reactive to light. Right eye exhibits no discharge. Left eye exhibits no discharge. No scleral icterus.   Neck: Trachea normal and phonation normal. Neck supple. No neck rigidity present.   Cardiovascular: Normal rate, regular rhythm, normal heart sounds and normal pulses.   Pulmonary/Chest: Effort normal and breath sounds normal. No respiratory distress. She has no wheezes. She has no rhonchi. She has no rales.   Abdominal: Normal appearance and bowel sounds are normal. She exhibits no distension. Soft. There is no abdominal tenderness.   Musculoskeletal: Normal range of motion.         General: Normal range of motion.      Cervical back: She exhibits no tenderness.   Lymphadenopathy:     She has no cervical adenopathy.   Neurological: She is alert and oriented to person, place, and time. She exhibits normal muscle tone.   Skin: Skin is warm, dry, intact, not diaphoretic, not pale and no rash. Capillary refill takes 2 to 3 seconds. No erythema   Psychiatric: Her speech is normal and behavior is normal. Judgment and thought content normal.   Nursing note and vitals reviewed.      Assessment:     1. Acute otitis media, right    2. Acute recurrent pansinusitis        Plan:       Acute otitis media, right    Acute recurrent pansinusitis    Other orders  -     dexAMETHasone injection 5 mg  -     amoxicillin-clavulanate 875-125mg (AUGMENTIN) 875-125 mg per tablet; Take 1 tablet by mouth every 12 (twelve) hours. for 10 days  Dispense: 20 tablet; Refill: 0  -     benzonatate (TESSALON) 200 MG capsule; Take 1 capsule (200 mg total) by mouth 3 (three) times daily as needed for Cough.  Dispense: 30 capsule; Refill: 0                Labs:  Patient refused need for point of care testing.    Decadron 5 mg IM in clinic per patient request.  Patient tolerated well.  No complications noted.     Over-the-counter antihistamine of choice such as Xyzal, Claritin, Zyrtec, or Allegra along with Flonase.  Patient advised to closely monitor blood sugar and diet over the next week.    Take medications as prescribed.    Tylenol/Motrin per package instructions for any pain or fever.    Assure adequate hydration.    Nasal saline flushes or irrigation as directed.    Follow-up with PCP in 1-2 days.    Return to clinic as needed.    To ED for any new or acutely worsening symptoms.    Patient in agreement with plan of care.    DISCLAIMER: Please note that my documentation in this Electronic Healthcare Record was produced using speech recognition software and therefore may contain errors related to that software system.These could include grammar, punctuation and spelling errors or the inclusion/exclusion of phrases that were not intended. Garbled syntax, mangled pronouns, and other bizarre constructions may be attributed to that software system.

## 2024-04-03 ENCOUNTER — TELEPHONE (OUTPATIENT)
Dept: RADIOLOGY | Facility: HOSPITAL | Age: 59
End: 2024-04-03

## 2024-04-03 DIAGNOSIS — Z01.812 PRE-PROCEDURE LAB EXAM: Primary | ICD-10-CM

## 2024-04-03 RX ORDER — ASPIRIN 81 MG/1
81 TABLET ORAL DAILY
COMMUNITY

## 2024-04-03 NOTE — NURSING
Cta cardiac scheduled @ [facility] on 4/18 @ 8am with arrival @ 715.  Will have blood work done prior to 4/16. Pre-testing instructions given and understanding verbalized.

## 2024-04-13 ENCOUNTER — LAB VISIT (OUTPATIENT)
Dept: LAB | Facility: HOSPITAL | Age: 59
End: 2024-04-13
Attending: NURSE PRACTITIONER
Payer: COMMERCIAL

## 2024-04-13 DIAGNOSIS — Z01.812 PRE-PROCEDURE LAB EXAM: ICD-10-CM

## 2024-04-13 LAB
CREAT SERPL-MCNC: 0.7 MG/DL (ref 0.5–1.4)
EST. GFR  (NO RACE VARIABLE): >60 ML/MIN/1.73 M^2

## 2024-04-13 PROCEDURE — 36415 COLL VENOUS BLD VENIPUNCTURE: CPT | Performed by: NURSE PRACTITIONER

## 2024-04-13 PROCEDURE — 82565 ASSAY OF CREATININE: CPT | Performed by: NURSE PRACTITIONER

## 2024-04-22 ENCOUNTER — HOSPITAL ENCOUNTER (OUTPATIENT)
Dept: RADIOLOGY | Facility: HOSPITAL | Age: 59
Discharge: HOME | End: 2024-04-22
Payer: COMMERCIAL

## 2024-04-22 VITALS
RESPIRATION RATE: 18 BRPM | OXYGEN SATURATION: 99 % | SYSTOLIC BLOOD PRESSURE: 128 MMHG | HEART RATE: 93 BPM | DIASTOLIC BLOOD PRESSURE: 109 MMHG

## 2024-04-22 DIAGNOSIS — E78.5 DYSLIPIDEMIA: ICD-10-CM

## 2024-04-22 PROCEDURE — 25000003 PHARM REV CODE 250

## 2024-04-22 RX ORDER — METOPROLOL TARTRATE 50 MG/1
50 TABLET ORAL ONCE
Status: COMPLETED | OUTPATIENT
Start: 2024-04-22 | End: 2024-04-22

## 2024-04-22 RX ORDER — METOPROLOL TARTRATE 50 MG/1
50 TABLET ORAL 2 TIMES DAILY
COMMUNITY
End: 2024-04-22 | Stop reason: SDUPTHER

## 2024-04-22 RX ORDER — METOPROLOL TARTRATE 50 MG/1
50 TABLET ORAL 2 TIMES DAILY
Qty: 180 TABLET | Refills: 3 | Status: SHIPPED | OUTPATIENT
Start: 2024-04-22 | End: 2025-04-22

## 2024-04-22 RX ADMIN — METOPROLOL TARTRATE 50 MG: 50 TABLET ORAL at 07:04

## 2024-05-02 DIAGNOSIS — E09.9 STEROID-INDUCED DIABETES MELLITUS, SEQUELA: ICD-10-CM

## 2024-05-02 DIAGNOSIS — T38.0X5S STEROID-INDUCED DIABETES MELLITUS, SEQUELA: ICD-10-CM

## 2024-05-02 DIAGNOSIS — E11.69 TYPE 2 DIABETES MELLITUS WITH OTHER SPECIFIED COMPLICATION, UNSPECIFIED WHETHER LONG TERM INSULIN USE: ICD-10-CM

## 2024-05-03 ENCOUNTER — HOSPITAL ENCOUNTER (OUTPATIENT)
Dept: RADIOLOGY | Facility: HOSPITAL | Age: 59
Discharge: HOME | End: 2024-05-03
Payer: COMMERCIAL

## 2024-05-03 VITALS
SYSTOLIC BLOOD PRESSURE: 134 MMHG | DIASTOLIC BLOOD PRESSURE: 75 MMHG | HEART RATE: 68 BPM | RESPIRATION RATE: 18 BRPM | OXYGEN SATURATION: 100 %

## 2024-05-03 PROCEDURE — 75574 CT ANGIO HRT W/3D IMAGE: CPT | Mod: 26,,, | Performed by: RADIOLOGY

## 2024-05-03 PROCEDURE — 25000003 PHARM REV CODE 250: Performed by: NURSE PRACTITIONER

## 2024-05-03 PROCEDURE — 25500020 PHARM REV CODE 255

## 2024-05-03 PROCEDURE — 75574 CT ANGIO HRT W/3D IMAGE: CPT | Mod: TC

## 2024-05-03 RX ORDER — METOPROLOL TARTRATE 1 MG/ML
5 INJECTION, SOLUTION INTRAVENOUS ONCE
Status: COMPLETED | OUTPATIENT
Start: 2024-05-03 | End: 2024-05-03

## 2024-05-03 RX ORDER — NITROGLYCERIN 400 UG/1
1 SPRAY ORAL ONCE
Status: COMPLETED | OUTPATIENT
Start: 2024-05-03 | End: 2024-05-03

## 2024-05-03 RX ADMIN — METOPROLOL TARTRATE 5 MG: 1 INJECTION, SOLUTION INTRAVENOUS at 08:05

## 2024-05-03 RX ADMIN — NITROGLYCERIN 1 SPRAY: 400 SPRAY ORAL at 09:05

## 2024-05-03 RX ADMIN — IOHEXOL 90 ML: 350 INJECTION, SOLUTION INTRAVENOUS at 10:05

## 2024-05-03 NOTE — PLAN OF CARE
Pt tolerated cta cardiac well no co pain, no acute distress noted ,verbal discharge instructions given to pt ,verbalized understanding, to include drinking at least 6,8 oz glasses of water to flush the contrast given for the procedure through her system, discharged to home ambulatory with .

## 2024-05-04 RX ORDER — SEMAGLUTIDE 0.68 MG/ML
0.5 INJECTION, SOLUTION SUBCUTANEOUS
Qty: 3 ML | Refills: 5 | Status: SHIPPED | OUTPATIENT
Start: 2024-05-04 | End: 2024-10-31

## 2024-05-14 ENCOUNTER — TELEPHONE (OUTPATIENT)
Dept: CARDIOLOGY | Facility: CLINIC | Age: 59
End: 2024-05-14
Payer: COMMERCIAL

## 2024-05-14 NOTE — TELEPHONE ENCOUNTER
----- Message from Cathie Almaraz, Patient Care Assistant sent at 5/14/2024  9:59 AM CDT -----  Regarding: medication  Contact: pt  Type: Needs Medical Advice    Who Called:  pt     Best Call Back Number: [phone] (home)     Additional Information: pt states she would like a callback regarding an unknown medication. Please call to advise. Thanks!

## 2024-05-30 DIAGNOSIS — G89.4 CHRONIC PAIN SYNDROME: ICD-10-CM

## 2024-05-30 DIAGNOSIS — M06.00 SERONEGATIVE RHEUMATOID ARTHRITIS: ICD-10-CM

## 2024-05-30 RX ORDER — OXYCODONE AND ACETAMINOPHEN 7.5; 325 MG/1; MG/1
1 TABLET ORAL EVERY 8 HOURS PRN
Qty: 90 TABLET | Refills: 0 | Status: CANCELLED | OUTPATIENT
Start: 2024-05-30

## 2024-06-01 RX ORDER — OXYCODONE AND ACETAMINOPHEN 7.5; 325 MG/1; MG/1
1 TABLET ORAL EVERY 8 HOURS PRN
Qty: 90 TABLET | Refills: 0 | Status: SHIPPED | OUTPATIENT
Start: 2024-06-01

## 2024-06-26 DIAGNOSIS — G89.4 CHRONIC PAIN SYNDROME: ICD-10-CM

## 2024-06-26 DIAGNOSIS — J30.2 SEASONAL ALLERGIES: ICD-10-CM

## 2024-06-26 DIAGNOSIS — M06.00 SERONEGATIVE RHEUMATOID ARTHRITIS: ICD-10-CM

## 2024-06-26 RX ORDER — AZELASTINE 1 MG/ML
1 SPRAY, METERED NASAL 2 TIMES DAILY
Qty: 30 ML | Refills: 3 | Status: CANCELLED | OUTPATIENT
Start: 2024-06-26 | End: 2025-06-26

## 2024-06-26 RX ORDER — OXYCODONE AND ACETAMINOPHEN 7.5; 325 MG/1; MG/1
1 TABLET ORAL EVERY 8 HOURS PRN
Qty: 90 TABLET | Refills: 0 | Status: CANCELLED | OUTPATIENT
Start: 2024-06-26

## 2024-06-26 RX ORDER — AZELASTINE 1 MG/ML
1 SPRAY, METERED NASAL 2 TIMES DAILY
Qty: 30 ML | Refills: 3 | Status: SHIPPED | OUTPATIENT
Start: 2024-06-26 | End: 2025-06-26

## 2024-07-01 NOTE — TELEPHONE ENCOUNTER
----- Message from Kellie Nick sent at 7/1/2024  1:52 PM CDT -----  Contact: pt  Type:  RX Refill Request    Who Called: pt    Refill or New Rx: refill    RX Name and Strength: rosuvastatin (CRESTOR) 10 MG tablet    How is the patient currently taking it? (ex. 1XDay): as directed    Is this a 30 day or 90 day RX: 90    Preferred Pharmacy with phone number:     [id number] - [place] - [address] [facility] [address] [facility] [address]  [address]  [facility] [address]  Phone: [phone] Fax: [phone]    Local or Mail Order:local    Ordering Provider: Marcial     Would the patient rather a call back or a response via [facility]? Call if questions    Best Call Back Number: [phone]    Additional Information: please refill script    Thanks\

## 2024-07-02 ENCOUNTER — PATIENT MESSAGE (OUTPATIENT)
Dept: CARDIOLOGY | Facility: CLINIC | Age: 59
End: 2024-07-02
Payer: COMMERCIAL

## 2024-07-02 RX ORDER — ROSUVASTATIN CALCIUM 10 MG/1
10 TABLET, COATED ORAL NIGHTLY
Qty: 90 TABLET | Refills: 3 | Status: SHIPPED | OUTPATIENT
Start: 2024-07-02

## 2024-07-03 DIAGNOSIS — M06.00 SERONEGATIVE RHEUMATOID ARTHRITIS: ICD-10-CM

## 2024-07-03 DIAGNOSIS — G89.4 CHRONIC PAIN SYNDROME: ICD-10-CM

## 2024-07-03 RX ORDER — OXYCODONE AND ACETAMINOPHEN 7.5; 325 MG/1; MG/1
1 TABLET ORAL EVERY 8 HOURS PRN
Qty: 90 TABLET | Refills: 0 | Status: CANCELLED | OUTPATIENT
Start: 2024-07-03

## 2024-07-05 ENCOUNTER — LAB VISIT (OUTPATIENT)
Dept: LAB | Facility: HOSPITAL | Age: 59
End: 2024-07-05
Attending: PHYSICIAN ASSISTANT
Payer: COMMERCIAL

## 2024-07-05 DIAGNOSIS — M06.00 SERONEGATIVE RHEUMATOID ARTHRITIS: ICD-10-CM

## 2024-07-05 DIAGNOSIS — G89.4 CHRONIC PAIN SYNDROME: ICD-10-CM

## 2024-07-05 LAB
ALBUMIN SERPL BCP-MCNC: 4.2 G/DL (ref 3.5–5.2)
ALP SERPL-CCNC: 55 U/L (ref 55–135)
ALT SERPL W/O P-5'-P-CCNC: 20 U/L (ref 10–44)
ANION GAP SERPL CALC-SCNC: 4 MMOL/L (ref 8–16)
AST SERPL-CCNC: 21 U/L (ref 10–40)
BASOPHILS # BLD AUTO: 0.01 K/UL (ref 0–0.2)
BASOPHILS NFR BLD: 0.2 % (ref 0–1.9)
BILIRUB SERPL-MCNC: 0.5 MG/DL (ref 0.1–1)
BUN SERPL-MCNC: 18 MG/DL (ref 6–20)
CALCIUM SERPL-MCNC: 9.4 MG/DL (ref 8.7–10.5)
CHLORIDE SERPL-SCNC: 105 MMOL/L (ref 95–110)
CO2 SERPL-SCNC: 32 MMOL/L (ref 23–29)
CREAT SERPL-MCNC: 0.7 MG/DL (ref 0.5–1.4)
CRP SERPL-MCNC: 0.8 MG/DL
DIFFERENTIAL METHOD BLD: ABNORMAL
EOSINOPHIL # BLD AUTO: 0.1 K/UL (ref 0–0.5)
EOSINOPHIL NFR BLD: 1.6 % (ref 0–8)
ERYTHROCYTE [DISTWIDTH] IN BLOOD BY AUTOMATED COUNT: 14.1 % (ref 11.5–14.5)
ERYTHROCYTE [SEDIMENTATION RATE] IN BLOOD BY WESTERGREN METHOD: 11 MM/HR (ref 0–20)
EST. GFR  (NO RACE VARIABLE): >60 ML/MIN/1.73 M^2
GLUCOSE SERPL-MCNC: 97 MG/DL (ref 70–110)
HCT VFR BLD AUTO: 40.6 % (ref 37–48.5)
HGB BLD-MCNC: 12.8 G/DL (ref 12–16)
IMM GRANULOCYTES # BLD AUTO: 0.01 K/UL (ref 0–0.04)
IMM GRANULOCYTES NFR BLD AUTO: 0.2 % (ref 0–0.5)
LYMPHOCYTES # BLD AUTO: 2.2 K/UL (ref 1–4.8)
LYMPHOCYTES NFR BLD: 40.1 % (ref 18–48)
MCH RBC QN AUTO: 27.3 PG (ref 27–31)
MCHC RBC AUTO-ENTMCNC: 31.5 G/DL (ref 32–36)
MCV RBC AUTO: 87 FL (ref 82–98)
MONOCYTES # BLD AUTO: 0.4 K/UL (ref 0.3–1)
MONOCYTES NFR BLD: 6.7 % (ref 4–15)
NEUTROPHILS # BLD AUTO: 2.9 K/UL (ref 1.8–7.7)
NEUTROPHILS NFR BLD: 51.2 % (ref 38–73)
NRBC BLD-RTO: 0 /100 WBC
PLATELET # BLD AUTO: 294 K/UL (ref 150–450)
PMV BLD AUTO: 9.9 FL (ref 9.2–12.9)
POTASSIUM SERPL-SCNC: 3.9 MMOL/L (ref 3.5–5.1)
PROT SERPL-MCNC: 6.8 G/DL (ref 6–8.4)
RBC # BLD AUTO: 4.69 M/UL (ref 4–5.4)
SODIUM SERPL-SCNC: 141 MMOL/L (ref 136–145)
WBC # BLD AUTO: 5.56 K/UL (ref 3.9–12.7)

## 2024-07-05 PROCEDURE — 85025 COMPLETE CBC W/AUTO DIFF WBC: CPT | Performed by: PHYSICIAN ASSISTANT

## 2024-07-05 PROCEDURE — 86140 C-REACTIVE PROTEIN: CPT | Performed by: PHYSICIAN ASSISTANT

## 2024-07-05 PROCEDURE — 36415 COLL VENOUS BLD VENIPUNCTURE: CPT | Performed by: PHYSICIAN ASSISTANT

## 2024-07-05 PROCEDURE — 85651 RBC SED RATE NONAUTOMATED: CPT | Performed by: PHYSICIAN ASSISTANT

## 2024-07-05 PROCEDURE — 80053 COMPREHEN METABOLIC PANEL: CPT | Performed by: PHYSICIAN ASSISTANT

## 2024-07-09 RX ORDER — OXYCODONE AND ACETAMINOPHEN 7.5; 325 MG/1; MG/1
1 TABLET ORAL EVERY 8 HOURS PRN
Qty: 90 TABLET | Refills: 0 | Status: SHIPPED | OUTPATIENT
Start: 2024-07-09 | End: 2024-07-11

## 2024-07-11 ENCOUNTER — OFFICE VISIT (OUTPATIENT)
Dept: RHEUMATOLOGY | Facility: CLINIC | Age: 59
End: 2024-07-11
Payer: COMMERCIAL

## 2024-07-11 VITALS
SYSTOLIC BLOOD PRESSURE: 116 MMHG | WEIGHT: 186.94 LBS | HEART RATE: 78 BPM | HEIGHT: 64 IN | DIASTOLIC BLOOD PRESSURE: 72 MMHG | BODY MASS INDEX: 31.91 KG/M2

## 2024-07-11 DIAGNOSIS — M54.2 NECK PAIN, ACUTE: ICD-10-CM

## 2024-07-11 DIAGNOSIS — G89.4 CHRONIC PAIN SYNDROME: ICD-10-CM

## 2024-07-11 DIAGNOSIS — G47.00 INSOMNIA, UNSPECIFIED TYPE: ICD-10-CM

## 2024-07-11 DIAGNOSIS — Z79.899 ON SULFASALAZINE THERAPY: ICD-10-CM

## 2024-07-11 DIAGNOSIS — M06.00 SERONEGATIVE RHEUMATOID ARTHRITIS: ICD-10-CM

## 2024-07-11 DIAGNOSIS — E55.9 VITAMIN D DEFICIENCY: ICD-10-CM

## 2024-07-11 DIAGNOSIS — D84.821 IMMUNOCOMPROMISED STATE DUE TO DRUG THERAPY: ICD-10-CM

## 2024-07-11 DIAGNOSIS — E11.69 TYPE 2 DIABETES MELLITUS WITH OTHER SPECIFIED COMPLICATION, UNSPECIFIED WHETHER LONG TERM INSULIN USE: Primary | ICD-10-CM

## 2024-07-11 DIAGNOSIS — Z79.899 IMMUNOCOMPROMISED STATE DUE TO DRUG THERAPY: ICD-10-CM

## 2024-07-11 PROCEDURE — 1159F MED LIST DOCD IN RCRD: CPT | Mod: CPTII,S$GLB,, | Performed by: INTERNAL MEDICINE

## 2024-07-11 PROCEDURE — 1160F RVW MEDS BY RX/DR IN RCRD: CPT | Mod: CPTII,S$GLB,, | Performed by: INTERNAL MEDICINE

## 2024-07-11 PROCEDURE — 3008F BODY MASS INDEX DOCD: CPT | Mod: CPTII,S$GLB,, | Performed by: INTERNAL MEDICINE

## 2024-07-11 PROCEDURE — 99999 PR PBB SHADOW E&M-EST. PATIENT-LVL IV: CPT | Mod: PBBFAC,,, | Performed by: INTERNAL MEDICINE

## 2024-07-11 PROCEDURE — 4010F ACE/ARB THERAPY RXD/TAKEN: CPT | Mod: CPTII,S$GLB,, | Performed by: INTERNAL MEDICINE

## 2024-07-11 PROCEDURE — 3074F SYST BP LT 130 MM HG: CPT | Mod: CPTII,S$GLB,, | Performed by: INTERNAL MEDICINE

## 2024-07-11 PROCEDURE — 99215 OFFICE O/P EST HI 40 MIN: CPT | Mod: 25,S$GLB,, | Performed by: INTERNAL MEDICINE

## 2024-07-11 PROCEDURE — 96372 THER/PROPH/DIAG INJ SC/IM: CPT | Mod: S$GLB,,, | Performed by: INTERNAL MEDICINE

## 2024-07-11 PROCEDURE — 3078F DIAST BP <80 MM HG: CPT | Mod: CPTII,S$GLB,, | Performed by: INTERNAL MEDICINE

## 2024-07-11 RX ORDER — GABAPENTIN 600 MG/1
600 TABLET ORAL 3 TIMES DAILY
Qty: 270 TABLET | Refills: 1 | Status: SHIPPED | OUTPATIENT
Start: 2024-07-11 | End: 2025-07-11

## 2024-07-11 RX ORDER — AMITRIPTYLINE HYDROCHLORIDE 10 MG/1
10 TABLET, FILM COATED ORAL NIGHTLY PRN
Qty: 90 TABLET | Refills: 2 | Status: SHIPPED | OUTPATIENT
Start: 2024-07-11 | End: 2025-07-11

## 2024-07-11 RX ORDER — METHOCARBAMOL 500 MG/1
500 TABLET, FILM COATED ORAL 3 TIMES DAILY PRN
Qty: 270 TABLET | Refills: 2 | Status: SHIPPED | OUTPATIENT
Start: 2024-07-11 | End: 2025-07-11

## 2024-07-11 RX ORDER — PREDNISONE 2.5 MG/1
2.5 TABLET ORAL DAILY
Qty: 90 TABLET | Refills: 2 | Status: SHIPPED | OUTPATIENT
Start: 2024-07-11

## 2024-07-11 RX ORDER — ERGOCALCIFEROL 1.25 MG/1
50000 CAPSULE ORAL
Qty: 12 CAPSULE | Refills: 1 | Status: SHIPPED | OUTPATIENT
Start: 2024-07-11

## 2024-07-11 RX ORDER — CYANOCOBALAMIN 1000 UG/ML
1000 INJECTION, SOLUTION INTRAMUSCULAR; SUBCUTANEOUS
Status: COMPLETED | OUTPATIENT
Start: 2024-07-11 | End: 2024-07-11

## 2024-07-11 RX ORDER — OXYCODONE AND ACETAMINOPHEN 7.5; 325 MG/1; MG/1
1 TABLET ORAL EVERY 6 HOURS PRN
Qty: 120 TABLET | Refills: 0 | Status: SHIPPED | OUTPATIENT
Start: 2024-08-30 | End: 2024-09-29

## 2024-07-11 RX ORDER — KETOROLAC TROMETHAMINE 30 MG/ML
60 INJECTION, SOLUTION INTRAMUSCULAR; INTRAVENOUS
Status: COMPLETED | OUTPATIENT
Start: 2024-07-11 | End: 2024-07-11

## 2024-07-11 RX ORDER — SEMAGLUTIDE 1.34 MG/ML
1 INJECTION, SOLUTION SUBCUTANEOUS
Qty: 3 ML | Refills: 11 | Status: SHIPPED | OUTPATIENT
Start: 2024-07-11 | End: 2025-07-11

## 2024-07-11 RX ORDER — SULFASALAZINE 500 MG/1
1000 TABLET, DELAYED RELEASE ORAL 2 TIMES DAILY
Qty: 360 TABLET | Refills: 3 | Status: SHIPPED | OUTPATIENT
Start: 2024-07-11 | End: 2025-07-11

## 2024-07-11 RX ORDER — METHYLPREDNISOLONE ACETATE 80 MG/ML
160 INJECTION, SUSPENSION INTRA-ARTICULAR; INTRALESIONAL; INTRAMUSCULAR; SOFT TISSUE
Status: COMPLETED | OUTPATIENT
Start: 2024-07-11 | End: 2024-07-11

## 2024-07-11 RX ORDER — OXYCODONE AND ACETAMINOPHEN 7.5; 325 MG/1; MG/1
1 TABLET ORAL EVERY 6 HOURS PRN
Qty: 120 TABLET | Refills: 0 | Status: SHIPPED | OUTPATIENT
Start: 2024-08-01 | End: 2024-08-31

## 2024-07-11 RX ORDER — OXYCODONE AND ACETAMINOPHEN 7.5; 325 MG/1; MG/1
1 TABLET ORAL EVERY 6 HOURS PRN
Qty: 120 TABLET | Refills: 0 | Status: SHIPPED | OUTPATIENT
Start: 2024-09-30 | End: 2024-10-30

## 2024-07-11 RX ADMIN — METHYLPREDNISOLONE ACETATE 160 MG: 80 INJECTION, SUSPENSION INTRA-ARTICULAR; INTRALESIONAL; INTRAMUSCULAR; SOFT TISSUE at 03:07

## 2024-07-11 RX ADMIN — CYANOCOBALAMIN 1000 MCG: 1000 INJECTION, SOLUTION INTRAMUSCULAR; SUBCUTANEOUS at 03:07

## 2024-07-11 RX ADMIN — KETOROLAC TROMETHAMINE 60 MG: 30 INJECTION, SOLUTION INTRAMUSCULAR; INTRAVENOUS at 03:07

## 2024-07-11 NOTE — PROGRESS NOTES
Subjective:     Patient ID:  Susana Andersen    Chief Complaint:  Disease Management     History of Present Illness:  Pt is a 59 y.o. female  seronegative rheumatoid arthritis. She has been compliant with SSZ 1000 mg bid. She is taking prednisone 2.5 mg 2-3 days a week as needed for pain.  She continues to have pain and swelling in her hands R>L and wrist. She is prescribed norco for severe pain, which is not helping much. She is not ready to consider more aggressive treatment for RA. H aic went up so we will increase Ozempic     We reviewed her recent labs ESR/CRP normal.      Current tx:  1. SSZ  2. Norco  3. Gabapentin     Rheumatologic History:   - Diagnosis/es:  - Positive serologies:  - Infectious screening labs:  - Previous Treatments:  - Current Treatments:     Interval History:   Hospitalization since last office visit: No    Patient Active Problem List    Diagnosis Date Noted    Peripheral edema 12/01/2023    Abnormal nuclear stress test 09/27/2023    Essential hypertension 06/06/2023    Dyslipidemia 06/06/2023    Nonspecific abnormal electrocardiogram (ECG) (EKG) 06/06/2023    History of colon polyps 07/29/2021    Seronegative rheumatoid arthritis 10/22/2020     Past Surgical History:   Procedure Laterality Date    COLONOSCOPY N/A 7/29/2021    Procedure: COLONOSCOPY;  Surgeon: Edgar Bolaños MD;  Location: [facility];  Service: Endoscopy;  Laterality: N/A;    COLONOSCOPY W/ BIOPSIES       Social History     Tobacco Use    Smoking status: Never    Smokeless tobacco: Never   Substance Use Topics    Alcohol use: Yes     Alcohol/week: 1.0 standard drink of alcohol     Types: 1 Glasses of wine per week    Drug use: Never     Family History   Problem Relation Name Age of Onset    Rheum arthritis Mother      Diabetes Mellitus Mother      Stroke Mother      Diabetes Mellitus Father      Stroke Father      Hypertension Sister      Rheum arthritis Sister      Hypertension Brother      Hypertension Son       Review  of patient's allergies indicates:  No Known Allergies    Review of Systems   Review of Systems   Constitutional:  Positive for fatigue. Negative for activity change, appetite change, chills, diaphoresis, fever and unexpected weight change.   HENT:  Negative for congestion, dental problem, ear discharge, ear pain, facial swelling, mouth sores, nosebleeds, postnasal drip, rhinorrhea, sinus pressure, sneezing, sore throat, tinnitus, trouble swallowing and voice change.    Eyes:  Negative for photophobia, pain, discharge, redness and itching.   Respiratory:  Positive for cough. Negative for apnea, chest tightness, shortness of breath and wheezing.    Cardiovascular:  Positive for leg swelling. Negative for chest pain and palpitations.   Gastrointestinal:  Positive for abdominal pain. Negative for abdominal distention, constipation, diarrhea, nausea and vomiting.   Endocrine: Negative for cold intolerance, heat intolerance, polydipsia and polyuria.   Genitourinary:  Negative for decreased urine volume, difficulty urinating, dysuria, flank pain, frequency, hematuria and urgency.   Musculoskeletal:  Positive for arthralgias, back pain, gait problem, joint swelling, myalgias, neck pain and neck stiffness.   Skin:  Negative for pallor, rash and wound.   Allergic/Immunologic: Negative for immunocompromised state.   Neurological:  Negative for dizziness, tremors, numbness and headaches.   Hematological:  Negative for adenopathy. Does not bruise/bleed easily.   Psychiatric/Behavioral:  Negative for sleep disturbance. The patient is not nervous/anxious.         Current Medications:  Current Outpatient Medications   Medication Instructions    amitriptyline (ELAVIL) 10 mg, Oral, Nightly PRN    amitriptyline (ELAVIL) 10 mg, Oral, Nightly PRN    aspirin (ECOTRIN) 81 mg, Oral, Daily    azelastine (ASTELIN) 137 mcg (0.1 %) nasal spray Use 1 spray (137 mcg total) by Nasal route 2 (two) times daily.    cetirizine (ZYRTEC) 10 mg, Oral,  "Daily    diclofenac sodium (VOLTAREN) 1 % Gel Topical, Daily PRN    ergocalciferol (ERGOCALCIFEROL) 50,000 Units, Oral, Every 7 days    fluconazole (DIFLUCAN) 150 mg, Oral, Daily    gabapentin (NEURONTIN) 600 mg, Oral, 3 times daily    hydroCHLOROthiazide (HYDRODIURIL) 25 mg, Oral, Daily    lifitegrast (XIIDRA) 5 % Dpet 1 drop, Both Eyes, 2 times daily    losartan (COZAAR) 50 mg, Oral, Daily    methocarbamoL (ROBAXIN) 500 mg, Oral, 3 times daily PRN    metoprolol tartrate (LOPRESSOR) 25 mg, Oral, 2 times daily    montelukast (SINGULAIR) 10 mg, Oral, Nightly    [START ON 8/1/2024] oxyCODONE-acetaminophen (PERCOCET) 7.5-325 mg per tablet 1 tablet, Oral, Every 6 hours PRN    [START ON 8/30/2024] oxyCODONE-acetaminophen (PERCOCET) 7.5-325 mg per tablet 1 tablet, Oral, Every 6 hours PRN    [START ON 9/30/2024] oxyCODONE-acetaminophen (PERCOCET) 7.5-325 mg per tablet 1 tablet, Oral, Every 6 hours PRN    OZEMPIC 0.5 mg, Subcutaneous, Every 7 days    OZEMPIC 1 mg, Subcutaneous, Every 7 days    predniSONE (DELTASONE) 2.5 mg, Oral, Daily    rosuvastatin (CRESTOR) 10 mg, Oral, Nightly    sulfaSALAzine (AZULFIDINE) 1,000 mg, Oral, 2 times daily         Objective:     Vitals:    07/11/24 1508   BP: 116/72   Pulse: 78   Weight: 84.8 kg (186 lb 15.2 oz)   Height: 5' 4.02" (1.626 m)   PainSc:   7   PainLoc: Arm      Body mass index is 32.07 kg/m².     Physical Examinations:  Physical Exam   Constitutional: She is oriented to person, place, and time. No distress.   HENT:   Head: Normocephalic and atraumatic.   Eyes: Pupils are equal, round, and reactive to light. Right eye exhibits no discharge. Left eye exhibits no discharge.   Neck: No thyromegaly present.   Cardiovascular: Normal rate, regular rhythm and normal heart sounds. Exam reveals no gallop and no friction rub.   No murmur heard.  Pulmonary/Chest: Breath sounds normal. She has no wheezes. She has no rales. She exhibits no tenderness.   Abdominal: There is no abdominal " tenderness. There is no rebound and no guarding.   Musculoskeletal:         General: Tenderness and deformity present.      Right shoulder: Tenderness present.      Left shoulder: Tenderness present.      Right elbow: Normal.      Left elbow: Tenderness present.      Right wrist: Tenderness present.      Left wrist: Tenderness present.      Cervical back: Neck supple.      Right knee: Effusion present. Tenderness present.      Left knee: Effusion present. Tenderness present.   Lymphadenopathy:     She has no cervical adenopathy.   Neurological: She is alert and oriented to person, place, and time. Gait normal.   Skin: Skin is dry. No rash noted. No erythema. There is pallor.   Psychiatric: Mood and affect normal.   Vitals reviewed.      Right Side Rheumatological Exam     Examination finds the elbow normal.    The patient is tender to palpation of the shoulder, wrist, knee, 1st PIP, 1st MCP, 2nd PIP, 2nd MCP, 3rd PIP, 3rd MCP, 4th PIP, 4th MCP, 5th PIP and 5th MCP    She has swelling of the 1st PIP, 1st MCP, 2nd PIP, 2nd MCP, 3rd PIP, 3rd MCP, 4th PIP, 4th MCP, 5th PIP and 5th MCP    Shoulder Exam   Tenderness Location: no tenderness    Range of Motion   Active abduction:  abnormal   Adduction: abnormal  Sensation: normal    Knee Exam   Patellofemoral Crepitus: positive  Effusion: positive  Sensation: normal    Hip Exam   Tenderness Location: posterior  Sensation: normal    Elbow/Wrist Exam   Tenderness Location: no tenderness  Sensation: normal    Left Side Rheumatological Exam     The patient is tender to palpation of the shoulder, elbow, wrist, knee, 1st PIP, 1st MCP, 2nd PIP, 2nd MCP, 3rd PIP, 3rd MCP, 4th PIP, 4th MCP, 5th PIP and 5th MCP.    She has swelling of the 1st PIP, 1st MCP, 2nd PIP, 2nd MCP, 3rd PIP, 3rd MCP, 4th PIP, 4th MCP, 5th PIP and 5th MCP    Shoulder Exam   Tenderness Location: no tenderness    Range of Motion   Active abduction:  abnormal   Sensation: normal    Knee Exam     Patellofemoral  "Crepitus: positive  Effusion: positive  Sensation: normal    Hip Exam   Tenderness Location: posterior  Sensation: normal    Elbow/Wrist Exam   Sensation: normal      Back/Neck Exam   General Inspection   Gait: normal            Disease Assessment Scores:  Patient's Global Assessment of arthritis (0-10): 2  Physician's Global Assessment of arthritis (0-10): 2  Number of Tender Joints (0-28): 2  Number of Swollen Joints (0-28): 2         No data to display                Monitoring Lab Results:  Lab Results   Component Value Date    WBC 5.56 07/05/2024    RBC 4.69 07/05/2024    HGB 12.8 07/05/2024    HCT 40.6 07/05/2024    MCV 87 07/05/2024    MCH 27.3 07/05/2024    MCHC 31.5 (L) 07/05/2024    RDW 14.1 07/05/2024     07/05/2024        Lab Results   Component Value Date     07/05/2024    K 3.9 07/05/2024     07/05/2024    CO2 32 (H) 07/05/2024    GLU 97 07/05/2024    BUN 18 07/05/2024    CREATININE 0.7 07/05/2024    CALCIUM 9.4 07/05/2024    PROT 6.8 07/05/2024    ALBUMIN 4.2 07/05/2024    BILITOT 0.5 07/05/2024    ALKPHOS 55 07/05/2024    AST 21 07/05/2024    ALT 20 07/05/2024    ANIONGAP 4 (L) 07/05/2024    EGFRNORACEVR >60.0 07/05/2024       Lab Results   Component Value Date    SEDRATE 11 07/05/2024    CRP 0.80 07/05/2024        Lab Results   Component Value Date    JUYMXCED47SM 52 02/23/2024        Lab Results   Component Value Date    CHOL 116 (L) 10/07/2023    HDL 36 (L) 10/07/2023    LDLCALC 47.2 (L) 10/07/2023    TRIG 164 (H) 10/07/2023       Lab Results   Component Value Date    RF <10.0 02/23/2024    CCPANTIBODIE 1 02/23/2024     Lab Results   Component Value Date    ANASCREEN Negative <1:80 09/10/2020    DSDNA Negative 1:10 09/10/2020     No results found for: "HLABB27"    Infectious Disease Screening:  Lab Results   Component Value Date    HEPBSAG Negative 09/10/2020    HEPBSAB Negative 09/10/2020    HEPBIGM Negative 09/10/2020     Lab Results   Component Value Date    HEPCAB Negative " "09/10/2020     Lab Results   Component Value Date    TBGOLDPLUS Negative 09/10/2020     No results found for: "QUANTIFERON", "SVCMT", "QUANTAGVALUE", "QUANTNILVALU", "QUANTMITOGEN", "QFTTBAG", "QINT"     Imaging: DEXA, Xrays, MRIs, CTs, etc    Old & Outside Medical Records:  Reviewed old and all outside medical records available in Care Everywhere     Assessment:     Pt is a 59 y.o. female with rheumatoid arthritis. The patient has not achieved clinical remission. Plan is to continue sulfasalazine (AZULFIDINE)    Plan:      Encounter Diagnoses   Name Primary?    Type 2 diabetes mellitus with other specified complication, unspecified whether long term insulin use Yes    Chronic pain syndrome     Seronegative rheumatoid arthritis     On sulfasalazine therapy     Immunocompromised state due to drug therapy     Insomnia, unspecified type     Vitamin D deficiency     Neck pain, acute      Susana was seen today for disease management.    Diagnoses and all orders for this visit:    Type 2 diabetes mellitus with other specified complication, unspecified whether long term insulin use  -     semaglutide (OZEMPIC) 1 mg/dose (4 mg/3 mL); Inject 1 mg into the skin every 7 days.  -     oxyCODONE-acetaminophen (PERCOCET) 7.5-325 mg per tablet; Take 1 tablet by mouth every 6 (six) hours as needed for Pain.  -     oxyCODONE-acetaminophen (PERCOCET) 7.5-325 mg per tablet; Take 1 tablet by mouth every 6 (six) hours as needed for Pain.  -     oxyCODONE-acetaminophen (PERCOCET) 7.5-325 mg per tablet; Take 1 tablet by mouth every 6 (six) hours as needed for Pain.    Chronic pain syndrome  -     oxyCODONE-acetaminophen (PERCOCET) 7.5-325 mg per tablet; Take 1 tablet by mouth every 6 (six) hours as needed for Pain.  -     oxyCODONE-acetaminophen (PERCOCET) 7.5-325 mg per tablet; Take 1 tablet by mouth every 6 (six) hours as needed for Pain.  -     oxyCODONE-acetaminophen (PERCOCET) 7.5-325 mg per tablet; Take 1 tablet by mouth every 6 " (six) hours as needed for Pain.  -     ketorolac injection 60 mg  -     methylPREDNISolone acetate injection 160 mg  -     cyanocobalamin injection 1,000 mcg  -     gabapentin (NEURONTIN) 600 MG tablet; Take 1 tablet (600 mg total) by mouth 3 (three) times daily.  -     T4, Free; Future  -     TSH; Future  -     Cyclic Citrullinated Peptide Antibody, IgG; Future  -     Rheumatoid Factor; Future  -     Sedimentation rate; Future  -     C-Reactive Protein; Future  -     Comprehensive Metabolic Panel; Future  -     CBC Auto Differential; Future    Seronegative rheumatoid arthritis  -     oxyCODONE-acetaminophen (PERCOCET) 7.5-325 mg per tablet; Take 1 tablet by mouth every 6 (six) hours as needed for Pain.  -     oxyCODONE-acetaminophen (PERCOCET) 7.5-325 mg per tablet; Take 1 tablet by mouth every 6 (six) hours as needed for Pain.  -     oxyCODONE-acetaminophen (PERCOCET) 7.5-325 mg per tablet; Take 1 tablet by mouth every 6 (six) hours as needed for Pain.  -     ketorolac injection 60 mg  -     methylPREDNISolone acetate injection 160 mg  -     cyanocobalamin injection 1,000 mcg  -     sulfaSALAzine (AZULFIDINE) 500 MG EC tablet; Take 2 tablets (1,000 mg total) by mouth 2 (two) times daily.  -     predniSONE (DELTASONE) 2.5 MG tablet; Take 1 tablet (2.5 mg total) by mouth once daily.  -     methocarbamoL (ROBAXIN) 500 MG Tab; Take 1 tablet (500 mg total) by mouth 3 (three) times daily as needed (muscle spasms).  -     gabapentin (NEURONTIN) 600 MG tablet; Take 1 tablet (600 mg total) by mouth 3 (three) times daily.  -     T4, Free; Future  -     TSH; Future  -     Cyclic Citrullinated Peptide Antibody, IgG; Future  -     Rheumatoid Factor; Future  -     Sedimentation rate; Future  -     C-Reactive Protein; Future  -     Comprehensive Metabolic Panel; Future  -     CBC Auto Differential; Future    On sulfasalazine therapy  -     oxyCODONE-acetaminophen (PERCOCET) 7.5-325 mg per tablet; Take 1 tablet by mouth every 6  (six) hours as needed for Pain.  -     oxyCODONE-acetaminophen (PERCOCET) 7.5-325 mg per tablet; Take 1 tablet by mouth every 6 (six) hours as needed for Pain.  -     oxyCODONE-acetaminophen (PERCOCET) 7.5-325 mg per tablet; Take 1 tablet by mouth every 6 (six) hours as needed for Pain.  -     ketorolac injection 60 mg  -     methylPREDNISolone acetate injection 160 mg  -     cyanocobalamin injection 1,000 mcg  -     T4, Free; Future  -     TSH; Future  -     Cyclic Citrullinated Peptide Antibody, IgG; Future  -     Rheumatoid Factor; Future  -     Sedimentation rate; Future  -     C-Reactive Protein; Future  -     Comprehensive Metabolic Panel; Future  -     CBC Auto Differential; Future    Immunocompromised state due to drug therapy  -     oxyCODONE-acetaminophen (PERCOCET) 7.5-325 mg per tablet; Take 1 tablet by mouth every 6 (six) hours as needed for Pain.  -     oxyCODONE-acetaminophen (PERCOCET) 7.5-325 mg per tablet; Take 1 tablet by mouth every 6 (six) hours as needed for Pain.  -     oxyCODONE-acetaminophen (PERCOCET) 7.5-325 mg per tablet; Take 1 tablet by mouth every 6 (six) hours as needed for Pain.  -     T4, Free; Future  -     TSH; Future  -     Cyclic Citrullinated Peptide Antibody, IgG; Future  -     Rheumatoid Factor; Future  -     Sedimentation rate; Future  -     C-Reactive Protein; Future  -     Comprehensive Metabolic Panel; Future  -     CBC Auto Differential; Future    Insomnia, unspecified type  -     amitriptyline (ELAVIL) 10 MG tablet; Take 1 tablet (10 mg total) by mouth nightly as needed for Insomnia.    Vitamin D deficiency  -     ergocalciferol (ERGOCALCIFEROL) 50,000 unit Cap; Take 1 capsule (50,000 Units total) by mouth every 7 days.  -     T4, Free; Future  -     TSH; Future  -     Cyclic Citrullinated Peptide Antibody, IgG; Future  -     Rheumatoid Factor; Future  -     Sedimentation rate; Future  -     C-Reactive Protein; Future  -     Comprehensive Metabolic Panel; Future  -      CBC Auto Differential; Future    Neck pain, acute  -     gabapentin (NEURONTIN) 600 MG tablet; Take 1 tablet (600 mg total) by mouth 3 (three) times daily.         1.   Refill meds  2. Perocet refill  3. Labs ordered     Follow-up 4 months    More than 50% of the  40 minute encounter was spent face to face counseling the patient regarding current status and future plan of care as well as side effects  of the medications. All questions were answered to patient's satisfaction also includes  non-face to face time preparing to see the patient (eg, review of tests), Obtaining and/or reviewing separately obtained history, Documenting clinical information in the electronic or other health record, Independently interpreting results

## 2024-07-22 DIAGNOSIS — E78.5 DYSLIPIDEMIA: Primary | ICD-10-CM

## 2024-07-22 DIAGNOSIS — I10 ESSENTIAL HYPERTENSION: ICD-10-CM

## 2024-07-22 RX ORDER — ROSUVASTATIN CALCIUM 10 MG/1
10 TABLET, COATED ORAL NIGHTLY
Qty: 90 TABLET | Refills: 3 | Status: SHIPPED | OUTPATIENT
Start: 2024-07-22

## 2024-07-22 RX ORDER — HYDROCHLOROTHIAZIDE 25 MG/1
25 TABLET ORAL DAILY
Qty: 90 TABLET | Refills: 3 | Status: SHIPPED | OUTPATIENT
Start: 2024-07-22 | End: 2025-07-22

## 2024-07-22 NOTE — TELEPHONE ENCOUNTER
Patient was last seen in the office on 12/01/23 with Johana Ceja Np  pt is requesting a refills the request will be sent to the provider [facility] [place] pt ient also has an assigned visit for 8/15/24 with Johana Ceja Np

## 2024-07-22 NOTE — TELEPHONE ENCOUNTER
----- Message from Sridevi Missy sent at 7/22/2024  8:24 AM CDT -----  Contact: Self  Type:  RX Refill Request    Who Called:  Patient  Refill or New Rx:  New Rx  RX Name and Strength:  rosuvastatin (CRESTOR) 10 MG tablet, losartan (COZAAR) 50 MG tablet and hydroCHLOROthiazide (HYDRODIURIL) 25 MG tablet  How is the patient currently taking it? (ex. 1XDay):  As Directed  Is this a 30 day or 90 day RX:  90  Preferred Pharmacy with phone number:    [id number] - [place], [place] - [address] AT [facility] [address]  [address]  [place] [address]  Phone: [phone] Fax: [phone]  Local or Mail Order:  Local  Ordering Provider:  Johana Ceja NP  Best Call Back Number:  [phone]  Additional Information:  Pt stated she went to  her meds and was told the office has to call them in because she has no more on file, she has an appt on 08/15 but will be out of meds by the end of the week can we get her in sooner maybe. Thank You

## 2024-07-27 ENCOUNTER — OFFICE VISIT (OUTPATIENT)
Dept: URGENT CARE | Facility: CLINIC | Age: 59
End: 2024-07-27
Payer: COMMERCIAL

## 2024-07-27 VITALS
HEIGHT: 64 IN | DIASTOLIC BLOOD PRESSURE: 88 MMHG | WEIGHT: 186.94 LBS | TEMPERATURE: 98 F | SYSTOLIC BLOOD PRESSURE: 147 MMHG | HEART RATE: 104 BPM | RESPIRATION RATE: 16 BRPM | BODY MASS INDEX: 31.91 KG/M2 | OXYGEN SATURATION: 98 %

## 2024-07-27 DIAGNOSIS — R35.0 URINARY FREQUENCY: ICD-10-CM

## 2024-07-27 DIAGNOSIS — R30.0 DYSURIA: Primary | ICD-10-CM

## 2024-07-27 DIAGNOSIS — N30.00 ACUTE CYSTITIS WITHOUT HEMATURIA: ICD-10-CM

## 2024-07-27 LAB
BILIRUB UR QL STRIP: NEGATIVE
GLUCOSE UR QL STRIP: NEGATIVE
KETONES UR QL STRIP: NEGATIVE
LEUKOCYTE ESTERASE UR QL STRIP: POSITIVE
PH, POC UA: 6.5
POC BLOOD, URINE: NEGATIVE
POC NITRATES, URINE: NEGATIVE
PROT UR QL STRIP: NEGATIVE
SP GR UR STRIP: 1.01 (ref 1–1.03)
UROBILINOGEN UR STRIP-ACNC: 0.2 (ref 0.1–1.1)

## 2024-07-27 PROCEDURE — 99214 OFFICE O/P EST MOD 30 MIN: CPT | Mod: S$GLB,,,

## 2024-07-27 PROCEDURE — 81003 URINALYSIS AUTO W/O SCOPE: CPT | Mod: QW,S$GLB,,

## 2024-07-27 RX ORDER — PHENAZOPYRIDINE HYDROCHLORIDE 100 MG/1
100 TABLET, FILM COATED ORAL 3 TIMES DAILY PRN
Qty: 9 TABLET | Refills: 0 | Status: SHIPPED | OUTPATIENT
Start: 2024-07-27 | End: 2024-08-06

## 2024-07-27 RX ORDER — NITROFURANTOIN 25; 75 MG/1; MG/1
100 CAPSULE ORAL 2 TIMES DAILY
Qty: 14 CAPSULE | Refills: 0 | Status: SHIPPED | OUTPATIENT
Start: 2024-07-27 | End: 2024-08-03

## 2024-07-27 NOTE — PATIENT INSTRUCTIONS
Thank you for allowing me to be part of your healthcare team at [facility]. It is a pleasure to care for you today.   Please take all of your medications as instructed and follow all new instructions from your visit today.  If you received labs or medical tests today you should hear information about results or scheduling either by phone or mychart within approximately a week.   If you have any questions or concerns please do not hesitate to call. Have a blessed day.   PERFECTO King

## 2024-07-27 NOTE — PROGRESS NOTES
"Subjective:      Patient ID: Susana Andersen is a 59 y.o. female.    Vitals:  height is 5' 4" (1.626 m) and weight is 84.8 kg (186 lb 15.2 oz). Her oral temperature is 98 °F (36.7 °C). Her blood pressure is 147/88 (abnormal) and her pulse is 104. Her respiration is 16 and oxygen saturation is 98%.     Chief Complaint: Urinary Tract Infection    Patient presents to the clinic with complaint of dysuria and urinary frequency.     Symptoms started 2 weeks ago. She has tried Azo and increasing her water intake without relief.   Denies fever.       Urinary Tract Infection   This is a new problem. The current episode started 1 to 4 weeks ago (14 days). The problem occurs every urination. The problem has been gradually worsening. The quality of the pain is described as burning. The pain is at a severity of 6/10. The pain is mild. There has been no fever. She is Sexually active. There is No history of pyelonephritis. Associated symptoms include flank pain, frequency and urgency. Pertinent negatives include no chills, nausea, vomiting or constipation. Treatments tried: cranberry/ azo. The treatment provided no relief.       Constitution: Negative for appetite change, chills, sweating, fatigue, fever and generalized weakness.   HENT:  Negative for ear pain, congestion, postnasal drip, sinus pain, sinus pressure, sore throat, trouble swallowing and voice change.    Neck: Negative for neck pain, neck stiffness, painful lymph nodes and neck swelling.   Cardiovascular:  Negative for chest pain, leg swelling and palpitations.   Respiratory:  Negative for chest tightness, cough, shortness of breath and wheezing.    Gastrointestinal:  Negative for abdominal pain, nausea, vomiting, constipation and diarrhea.   Genitourinary:  Positive for frequency, urgency and flank pain. Negative for dysuria and urine decreased.   Skin:  Negative for color change and pale.   Allergic/Immunologic: Negative for chronic cough.   Neurological:  Negative " for dizziness, headaches, disorientation and altered mental status.   Hematologic/Lymphatic: Negative for swollen lymph nodes.   Psychiatric/Behavioral:  Negative for altered mental status, disorientation and confusion.       Objective:     Physical Exam   Constitutional: She is oriented to person, place, and time. She appears well-developed. She is cooperative.   HENT:   Head: Normocephalic and atraumatic.   Ears:   Right Ear: Hearing and external ear normal.   Left Ear: Hearing and external ear normal.   Nose: Nose normal. No mucosal edema or nasal deformity. No epistaxis. Right sinus exhibits no maxillary sinus tenderness and no frontal sinus tenderness. Left sinus exhibits no maxillary sinus tenderness and no frontal sinus tenderness.   Mouth/Throat: Uvula is midline, oropharynx is clear and moist and mucous membranes are normal. No trismus in the jaw. Normal dentition. No uvula swelling.   Eyes: Conjunctivae and lids are normal.   Neck: Trachea normal and phonation normal.   Cardiovascular: Normal rate and normal pulses.   Pulmonary/Chest: Effort normal.   Abdominal: Normal appearance. There is no left CVA tenderness and no right CVA tenderness.   Musculoskeletal: Normal range of motion.         General: Normal range of motion.   Neurological: She is alert and oriented to person, place, and time. She exhibits normal muscle tone.   Skin: Skin is warm, dry and intact.   Psychiatric: Her speech is normal and behavior is normal. Judgment and thought content normal.   Nursing note and vitals reviewed.      Assessment:     1. Dysuria    2. Acute cystitis without hematuria    3. Urinary frequency        Plan:       Dysuria  -     Urine culture  -     POCT Urinalysis, Dipstick, Automated, W/O Scope  -     nitrofurantoin, macrocrystal-monohydrate, (MACROBID) 100 MG capsule; Take 1 capsule (100 mg total) by mouth 2 (two) times daily. for 7 days  Dispense: 14 capsule; Refill: 0  -     phenazopyridine (PYRIDIUM) 100 MG  tablet; Take 1 tablet (100 mg total) by mouth 3 (three) times daily as needed for Pain.  Dispense: 9 tablet; Refill: 0    Acute cystitis without hematuria    Urinary frequency     - UA positive for leukocytes   - Urine culture ordered   - Increase water intake  - Take medications as prescribed.  - Assure adequate hydration.  - Follow-up with PCP in 1-2 days.  - Return to clinic as needed.  - To ED for any new or acutely worsening symptoms including but not limited to chest pain, palpitations, shortness of breath, or fever greater than 103° F.  Patient in agreement with plan of care.     - The diagnosis, treatment plan, instructions for follow-up and reevaluation as well as ED precautions were discussed and understanding was verbalized. All questions or concerns have been addressed.

## 2024-07-31 LAB
BACTERIA UR CULT: NORMAL
BACTERIA UR CULT: NORMAL

## 2024-08-15 ENCOUNTER — OFFICE VISIT (OUTPATIENT)
Dept: CARDIOLOGY | Facility: CLINIC | Age: 59
End: 2024-08-15
Payer: COMMERCIAL

## 2024-08-15 VITALS
SYSTOLIC BLOOD PRESSURE: 137 MMHG | WEIGHT: 185.19 LBS | HEIGHT: 64 IN | BODY MASS INDEX: 31.62 KG/M2 | HEART RATE: 81 BPM | DIASTOLIC BLOOD PRESSURE: 92 MMHG

## 2024-08-15 DIAGNOSIS — I10 ESSENTIAL HYPERTENSION: ICD-10-CM

## 2024-08-15 DIAGNOSIS — E78.5 DYSLIPIDEMIA: ICD-10-CM

## 2024-08-15 DIAGNOSIS — R94.39 ABNORMAL NUCLEAR STRESS TEST: Primary | ICD-10-CM

## 2024-08-15 PROCEDURE — 99214 OFFICE O/P EST MOD 30 MIN: CPT | Mod: S$GLB,,, | Performed by: NURSE PRACTITIONER

## 2024-08-15 PROCEDURE — 1159F MED LIST DOCD IN RCRD: CPT | Mod: CPTII,S$GLB,, | Performed by: NURSE PRACTITIONER

## 2024-08-15 PROCEDURE — 4010F ACE/ARB THERAPY RXD/TAKEN: CPT | Mod: CPTII,S$GLB,, | Performed by: NURSE PRACTITIONER

## 2024-08-15 PROCEDURE — 3008F BODY MASS INDEX DOCD: CPT | Mod: CPTII,S$GLB,, | Performed by: NURSE PRACTITIONER

## 2024-08-15 PROCEDURE — 3080F DIAST BP >= 90 MM HG: CPT | Mod: CPTII,S$GLB,, | Performed by: NURSE PRACTITIONER

## 2024-08-15 PROCEDURE — 3075F SYST BP GE 130 - 139MM HG: CPT | Mod: CPTII,S$GLB,, | Performed by: NURSE PRACTITIONER

## 2024-08-15 PROCEDURE — 99999 PR PBB SHADOW E&M-EST. PATIENT-LVL V: CPT | Mod: PBBFAC,,, | Performed by: NURSE PRACTITIONER

## 2024-08-15 PROCEDURE — 1160F RVW MEDS BY RX/DR IN RCRD: CPT | Mod: CPTII,S$GLB,, | Performed by: NURSE PRACTITIONER

## 2024-08-15 RX ORDER — LOSARTAN POTASSIUM 50 MG/1
50 TABLET ORAL DAILY
Qty: 90 TABLET | Refills: 3 | Status: SHIPPED | OUTPATIENT
Start: 2024-08-15 | End: 2024-08-15

## 2024-08-15 RX ORDER — ROSUVASTATIN CALCIUM 10 MG/1
10 TABLET, COATED ORAL NIGHTLY
Qty: 90 TABLET | Refills: 3 | Status: SHIPPED | OUTPATIENT
Start: 2024-08-15

## 2024-08-15 RX ORDER — LOSARTAN POTASSIUM 50 MG/1
50 TABLET ORAL DAILY
Qty: 90 TABLET | Refills: 3 | Status: SHIPPED | OUTPATIENT
Start: 2024-08-15 | End: 2025-08-15

## 2024-08-15 RX ORDER — HYDROCHLOROTHIAZIDE 25 MG/1
25 TABLET ORAL DAILY
Qty: 90 TABLET | Refills: 3 | Status: SHIPPED | OUTPATIENT
Start: 2024-08-15 | End: 2025-08-15

## 2024-08-15 NOTE — PROGRESS NOTES
Subjective:    Patient ID:  Susana Andersen is a 59 y.o. female who presents for follow-up of   Chief Complaint   Patient presents with    Hypertension       HPI:      Susana Andersen is here for follow-up visit. Denies chest pain or shortness of breath. Denies recent fever cough chills or congestion. Denies blood in the urine or blood in the stool.  Denies myalgias. Denies orthopnea or peripheral edema. Denies nausea vomiting or dyspepsia. No recent arm neck or jaw pain. No lightheadedness or dizziness. BP up today however at home it has been WNL. She will send me some numbers.       Review of patient's allergies indicates:  No Known Allergies    Past Medical History:   Diagnosis Date    Allergy     Arthritis     Colon polyps 07/29/2021    Hypertension      Past Surgical History:   Procedure Laterality Date    COLONOSCOPY N/A 7/29/2021    Procedure: COLONOSCOPY;  Surgeon: Edgar Bolaños MD;  Location: [facility];  Service: Endoscopy;  Laterality: N/A;    COLONOSCOPY W/ BIOPSIES       Social History     Tobacco Use    Smoking status: Never    Smokeless tobacco: Never   Substance Use Topics    Alcohol use: Yes     Alcohol/week: 1.0 standard drink of alcohol     Types: 1 Glasses of wine per week    Drug use: Never     Family History   Problem Relation Name Age of Onset    Rheum arthritis Mother      Diabetes Mellitus Mother      Stroke Mother      Diabetes Mellitus Father      Stroke Father      Hypertension Sister      Rheum arthritis Sister      Hypertension Brother      Hypertension Son          Review of Systems:   Constitution: Negative for diaphoresis and fever.   HEENT: Negative for nosebleeds.    Cardiovascular: Negative for chest pain       No dyspnea on exertion       No leg swelling        No palpitations  Respiratory: Negative for shortness of breath and wheezing.    Hematologic/Lymphatic: Negative for bleeding problem. Does not bruise/bleed easily.   Skin: Negative for color change and rash.    Musculoskeletal: Negative for falls and myalgias.   Gastrointestinal: Negative for hematemesis and hematochezia.   Genitourinary: Negative for hematuria.   Neurological: Negative for dizziness and light-headedness.   Psychiatric/Behavioral: Negative for altered mental status and memory loss.          Objective:        Vitals:    08/15/24 1327   BP: (!) 137/92   Pulse: 81       Lab Results   Component Value Date    WBC 5.56 07/05/2024    HGB 12.8 07/05/2024    HCT 40.6 07/05/2024     07/05/2024    CHOL 116 (L) 10/07/2023    TRIG 164 (H) 10/07/2023    HDL 36 (L) 10/07/2023    ALT 20 07/05/2024    AST 21 07/05/2024     07/05/2024    K 3.9 07/05/2024     07/05/2024    CREATININE 0.7 07/05/2024    BUN 18 07/05/2024    CO2 32 (H) 07/05/2024    TSH 1.642 02/23/2024        ECHOCARDIOGRAM RESULTS  Results for orders placed during the hospital encounter of 07/24/23    Echo    Interpretation Summary  · Left ventricle ejection fraction 65% all walls move normally  · Diastolic function is normal  · Mean left atrial pressure is normal no pulmonary hypertension  · Valve structures are normal with no stenosis or regurgitation  · No abnormal intracavitary echo        CURRENT/PREVIOUS VISIT EKG  Results for orders placed or performed in visit on 06/06/23   IN OFFICE EKG 12-LEAD (to [place])    Collection Time: 06/06/23 12:04 PM    Narrative    Test Reason : I10,E78.5,R94.31,    Vent. Rate : 058 BPM     Atrial Rate : 058 BPM     P-R Int : 164 ms          QRS Dur : 092 ms      QT Int : 432 ms       P-R-T Axes : 007 -01 -08 degrees     QTc Int : 424 ms    Sinus bradycardia  Minimal voltage criteria for LVH, may be normal variant ( R in aVL )  ST and T wave abnormality, consider anterior ischemia  Abnormal ECG  When compared with ECG of 27-JUL-2021 08:12,  No significant change was found  Confirmed by Romain Gonsales MD ([id number]) on 7/15/2023 5:05:24 PM    Referred By: EMBER VALERA           Confirmed By:Romain  Dru BARRETO     No valid procedures specified.   Results for orders placed during the hospital encounter of 07/24/23    Nuclear Stress - Cardiology Interpreted    Interpretation Summary    Abnormal myocardial perfusion scan.    There are two significant perfusion abnormalities.    Perfusion Abnormality #1 - There is a moderate intensity, moderate sized, reversible perfusion abnormality that is consistent with ischemia in the basal to apical inferior wall(s).    Perfusion Abnormality #2 - There is a small to moderate sized, moderate to severe intensity, mostly fixed perfusion abnormality with some reversibilty in the mid to apical anteroapical wall(s).    There are no other significant perfusion abnormalities.    There is a moderate to severe intensity perfusion abnormality in the  wall of the left ventricle, secondary to breast attenuation.    There is a  moderate to severe intensity fixed perfusion abnormality in the  wall of the left ventricle secondary to diaphragm attenuation.    The gated perfusion images showed an ejection fraction of 65% at rest. The gated perfusion images showed an ejection fraction of 71% post stress. Normal ejection fraction is greater than 53%.    There is normal wall motion at rest and post stress.    LV cavity size is normal at rest and normal at stress.    The ECG portion of the study is negative for ischemia.    The patient reported no chest pain during the stress test.    There were no arrhythmias during stress.    A PET stress exam is recommended if clinically indicated.      Physical Exam:  CONSTITUTIONAL: No fever, no chills  HEENT: Normocephalic, atraumatic,pupils reactive to light                 NECK:  No JVD no carotid bruit  CVS: S1S2+, RRR, no murmurs,   LUNGS: Clear  ABDOMEN: Soft, NT, BS+  EXTREMITIES: No cyanosis, edema  : No costa catheteR  NEURO: AAO X 3  PSY: Normal affect      Medication List with Changes/Refills   Current Medications    AMITRIPTYLINE (ELAVIL) 10 MG  TABLET    Take 1 tablet (10 mg total) by mouth nightly as needed for Insomnia.    AMITRIPTYLINE (ELAVIL) 10 MG TABLET    Take 1 tablet (10 mg total) by mouth nightly as needed for Insomnia.    ASPIRIN (ECOTRIN) 81 MG EC TABLET    Take 81 mg by mouth once daily.    AZELASTINE (ASTELIN) 137 MCG (0.1 %) NASAL SPRAY    Use 1 spray (137 mcg total) by Nasal route 2 (two) times daily.    CETIRIZINE (ZYRTEC) 10 MG TABLET    Take 1 tablet (10 mg total) by mouth once daily.    DICLOFENAC SODIUM (VOLTAREN) 1 % GEL    Apply topically daily as needed.    ERGOCALCIFEROL (ERGOCALCIFEROL) 50,000 UNIT CAP    Take 1 capsule (50,000 Units total) by mouth every 7 days.    FLUCONAZOLE (DIFLUCAN) 150 MG TAB    Take 150 mg by mouth once daily.    GABAPENTIN (NEURONTIN) 600 MG TABLET    Take 1 tablet (600 mg total) by mouth 3 (three) times daily.    LIFITEGRAST (XIIDRA) 5 % DPET    Place 1 drop into both eyes 2 (two) times a day.    METHOCARBAMOL (ROBAXIN) 500 MG TAB    Take 1 tablet (500 mg total) by mouth 3 (three) times daily as needed (muscle spasms).    MONTELUKAST (SINGULAIR) 10 MG TABLET    Take 1 tablet (10 mg total) by mouth every evening.    OXYCODONE-ACETAMINOPHEN (PERCOCET) 7.5-325 MG PER TABLET    Take 1 tablet by mouth every 6 (six) hours as needed for Pain.    OXYCODONE-ACETAMINOPHEN (PERCOCET) 7.5-325 MG PER TABLET    Take 1 tablet by mouth every 6 (six) hours as needed for Pain.    OXYCODONE-ACETAMINOPHEN (PERCOCET) 7.5-325 MG PER TABLET    Take 1 tablet by mouth every 6 (six) hours as needed for Pain.    PREDNISONE (DELTASONE) 2.5 MG TABLET    Take 1 tablet (2.5 mg total) by mouth once daily.    SEMAGLUTIDE (OZEMPIC) 0.25 MG OR 0.5 MG (2 MG/3 ML) PEN INJECTOR    Inject 0.5 mg into the skin every 7 days.    SEMAGLUTIDE (OZEMPIC) 1 MG/DOSE (4 MG/3 ML)    Inject 1 mg into the skin every 7 days.    SULFASALAZINE (AZULFIDINE) 500 MG EC TABLET    Take 2 tablets (1,000 mg total) by mouth 2 (two) times daily.   Changed and/or  Refilled Medications    Modified Medication Previous Medication    HYDROCHLOROTHIAZIDE (HYDRODIURIL) 25 MG TABLET hydroCHLOROthiazide (HYDRODIURIL) 25 MG tablet       Take 1 tablet (25 mg total) by mouth once daily.    Take 1 tablet (25 mg total) by mouth once daily.    LOSARTAN (COZAAR) 50 MG TABLET losartan (COZAAR) 50 MG tablet       Take 1 tablet (50 mg total) by mouth once daily.    Take 1 tablet (50 mg total) by mouth once daily.    ROSUVASTATIN (CRESTOR) 10 MG TABLET rosuvastatin (CRESTOR) 10 MG tablet       Take 1 tablet (10 mg total) by mouth every evening.    Take 1 tablet (10 mg total) by mouth every evening.   Discontinued Medications    INV METOPROLOL TARTRATE 25 MG ORAL TABLET (LOPRESSOR) 25 MG TAB    Take 1 tablet (25 mg total) by mouth 2 (two) times daily.             Assessment:       1. Abnormal nuclear stress test    2. Essential hypertension    3. Dyslipidemia         Plan:       Cardiac CTA Negative.     Continuing current management.     Hypertension Medications               hydroCHLOROthiazide (HYDRODIURIL) 25 MG tablet Take 1 tablet (25 mg total) by mouth once daily.    losartan (COZAAR) 50 MG tablet Take 1 tablet (50 mg total) by mouth once daily.           Hyperlipidemia Medications               rosuvastatin (CRESTOR) 10 MG tablet Take 1 tablet (10 mg total) by mouth every evening.                    Problem List Items Addressed This Visit          Cardiac/Vascular    Essential hypertension    Relevant Medications    hydroCHLOROthiazide (HYDRODIURIL) 25 MG tablet    rosuvastatin (CRESTOR) 10 MG tablet    Other Relevant Orders    IN OFFICE EKG 12-LEAD (to [facility])    Dyslipidemia    Relevant Medications    hydroCHLOROthiazide (HYDRODIURIL) 25 MG tablet    rosuvastatin (CRESTOR) 10 MG tablet    Other Relevant Orders    IN OFFICE EKG 12-LEAD (to [facility])    Abnormal nuclear stress test - Primary       No follow-ups on file.       Johana Ceja, AG-ACNP, CVNP-BC

## 2024-09-18 ENCOUNTER — TELEPHONE (OUTPATIENT)
Dept: RHEUMATOLOGY | Facility: CLINIC | Age: 59
End: 2024-09-18
Payer: COMMERCIAL

## 2024-09-18 DIAGNOSIS — M54.2 NECK PAIN: Primary | ICD-10-CM

## 2024-09-18 NOTE — TELEPHONE ENCOUNTER
Returned [facility] call and was informed pt came to hospital for neck xray and no orders were in the system. Nurse asked what else is needed and informed of message received    psi blood orders, recent notes, mri results, xray of neck orders . Hospital had no idea what any of that message is other than needing neck xray orders. Nurse informed will ask Chris and if needed will fax order over to [phone]. Verbalized understanding

## 2024-09-18 NOTE — TELEPHONE ENCOUNTER
----- Message from Noris Langston sent at 9/18/2024 12:20 PM CDT -----  Type: Needs Medical Advice  Who Called:  citlalli sequeira  nurse  Pharmacy name and phone #:    Best Call Back Number: [phone] fax number [phone]  Additional Information: is calling the office for psi blood orders, recent notes, mri results, xray of neck orders please call back to advise

## 2024-10-18 ENCOUNTER — HOSPITAL ENCOUNTER (OUTPATIENT)
Dept: RADIOLOGY | Facility: HOSPITAL | Age: 59
Discharge: HOME | End: 2024-10-18
Attending: INTERNAL MEDICINE
Payer: COMMERCIAL

## 2024-10-18 DIAGNOSIS — M54.2 NECK PAIN: ICD-10-CM

## 2024-10-18 PROCEDURE — 72050 X-RAY EXAM NECK SPINE 4/5VWS: CPT | Mod: TC

## 2024-10-18 PROCEDURE — 72050 X-RAY EXAM NECK SPINE 4/5VWS: CPT | Mod: 26,,, | Performed by: RADIOLOGY

## 2024-10-21 NOTE — PROGRESS NOTES
Your xray indicated significant osteoarthritis with spondylosis like due to osteoarthritis arthritis

## 2024-10-24 ENCOUNTER — OFFICE VISIT (OUTPATIENT)
Dept: RHEUMATOLOGY | Facility: CLINIC | Age: 59
End: 2024-10-24
Payer: COMMERCIAL

## 2024-10-24 VITALS
SYSTOLIC BLOOD PRESSURE: 112 MMHG | DIASTOLIC BLOOD PRESSURE: 76 MMHG | WEIGHT: 187.38 LBS | HEART RATE: 81 BPM | BODY MASS INDEX: 31.99 KG/M2 | HEIGHT: 64 IN

## 2024-10-24 DIAGNOSIS — Z79.899 ON SULFASALAZINE THERAPY: ICD-10-CM

## 2024-10-24 DIAGNOSIS — E11.69 TYPE 2 DIABETES MELLITUS WITH OTHER SPECIFIED COMPLICATION, UNSPECIFIED WHETHER LONG TERM INSULIN USE: ICD-10-CM

## 2024-10-24 DIAGNOSIS — D84.821 IMMUNOCOMPROMISED STATE DUE TO DRUG THERAPY: ICD-10-CM

## 2024-10-24 DIAGNOSIS — M47.812 CERVICAL SPONDYLOSIS: ICD-10-CM

## 2024-10-24 DIAGNOSIS — M06.00 SERONEGATIVE RHEUMATOID ARTHRITIS: ICD-10-CM

## 2024-10-24 DIAGNOSIS — G89.4 CHRONIC PAIN SYNDROME: ICD-10-CM

## 2024-10-24 DIAGNOSIS — M06.00 SERONEGATIVE RHEUMATOID ARTHRITIS: Primary | ICD-10-CM

## 2024-10-24 DIAGNOSIS — Z79.899 IMMUNOCOMPROMISED STATE DUE TO DRUG THERAPY: ICD-10-CM

## 2024-10-24 PROCEDURE — 99999 PR PBB SHADOW E&M-EST. PATIENT-LVL V: CPT | Mod: PBBFAC,,, | Performed by: PHYSICIAN ASSISTANT

## 2024-10-24 RX ORDER — CYANOCOBALAMIN 1000 UG/ML
1000 INJECTION, SOLUTION INTRAMUSCULAR; SUBCUTANEOUS
Status: COMPLETED | OUTPATIENT
Start: 2024-10-24 | End: 2024-10-24

## 2024-10-24 RX ORDER — CYCLOBENZAPRINE HCL 10 MG
10 TABLET ORAL 3 TIMES DAILY PRN
Qty: 90 TABLET | Refills: 5 | Status: SHIPPED | OUTPATIENT
Start: 2024-10-24

## 2024-10-24 RX ORDER — LEVOCETIRIZINE DIHYDROCHLORIDE 5 MG/1
5 TABLET, FILM COATED ORAL NIGHTLY
COMMUNITY
Start: 2024-03-26 | End: 2025-03-26

## 2024-10-24 RX ORDER — METHYLPREDNISOLONE ACETATE 80 MG/ML
160 INJECTION, SUSPENSION INTRA-ARTICULAR; INTRALESIONAL; INTRAMUSCULAR; SOFT TISSUE
Status: COMPLETED | OUTPATIENT
Start: 2024-10-24 | End: 2024-10-24

## 2024-10-24 RX ORDER — METOPROLOL TARTRATE 25 MG/1
25 TABLET, FILM COATED ORAL 2 TIMES DAILY
COMMUNITY
Start: 2024-10-20

## 2024-10-24 RX ORDER — KETOROLAC TROMETHAMINE 30 MG/ML
60 INJECTION, SOLUTION INTRAMUSCULAR; INTRAVENOUS
Status: COMPLETED | OUTPATIENT
Start: 2024-10-24 | End: 2024-10-24

## 2024-10-24 RX ORDER — ATORVASTATIN CALCIUM 20 MG/1
20 TABLET, FILM COATED ORAL DAILY
COMMUNITY
Start: 2024-09-21

## 2024-10-24 RX ORDER — AMLODIPINE BESYLATE 5 MG/1
5 TABLET ORAL 2 TIMES DAILY
COMMUNITY
Start: 2024-07-22

## 2024-10-24 RX ADMIN — METHYLPREDNISOLONE ACETATE 160 MG: 80 INJECTION, SUSPENSION INTRA-ARTICULAR; INTRALESIONAL; INTRAMUSCULAR; SOFT TISSUE at 03:10

## 2024-10-24 RX ADMIN — CYANOCOBALAMIN 1000 MCG: 1000 INJECTION, SOLUTION INTRAMUSCULAR; SUBCUTANEOUS at 02:10

## 2024-10-24 RX ADMIN — KETOROLAC TROMETHAMINE 60 MG: 30 INJECTION, SOLUTION INTRAMUSCULAR; INTRAVENOUS at 02:10

## 2024-10-24 ASSESSMENT — ROUTINE ASSESSMENT OF PATIENT INDEX DATA (RAPID3)
PSYCHOLOGICAL DISTRESS SCORE: 2.2
TOTAL RAPID3 SCORE: 6
MDHAQ FUNCTION SCORE: 0.9
PATIENT GLOBAL ASSESSMENT SCORE: 7.5
FATIGUE SCORE: 1.1
PAIN SCORE: 7.5

## 2024-10-24 NOTE — PROGRESS NOTES
Subjective:       Patient ID: Susana Andersen is a 59 y.o. female.    Chief Complaint: Disease Management      Mrs. Andersen is a 59 year old female who presents to clinic for follow up on seronegative rheumatoid arthritis. She has been compliant with SSZ 1000 mg bid. She is taking prednisone 2.5 mg PRN for severe flares. She had a flare in her R elbow and more intense pain in her hands recently. She feels pain is worse with overuse.   She is not ready to consider more aggressive treatment for RA. She is taking percocet tid prn for severe pain and this is helping.     We reviewed her recent labs CRP is elevated at 1.0    Current tx:  1. SSZ  2. Norco  3. Gabapentin        Review of Systems   Constitutional:  Positive for activity change. Negative for appetite change, chills, fatigue and fever.   Eyes:  Negative for visual disturbance.   Respiratory:  Negative for cough and shortness of breath.    Cardiovascular:  Negative for chest pain, palpitations and leg swelling.   Gastrointestinal:  Negative for abdominal pain, constipation, diarrhea, nausea and vomiting.   Genitourinary:  Negative for dysuria.   Musculoskeletal:  Positive for arthralgias, joint swelling and myalgias.   Allergic/Immunologic: Negative for immunocompromised state.   Neurological:  Negative for dizziness, weakness, light-headedness and headaches.         Objective:     Vitals:    10/24/24 1310   BP: 112/76   Pulse: 81         Past Medical History:   Diagnosis Date    Allergy     Arthritis     Colon polyps 07/29/2021    Hypertension      Past Surgical History:   Procedure Laterality Date    COLONOSCOPY N/A 7/29/2021    Procedure: COLONOSCOPY;  Surgeon: Edgar Bolaños MD;  Location: [facility];  Service: Endoscopy;  Laterality: N/A;    COLONOSCOPY W/ BIOPSIES            Physical Exam   Cardiovascular: Normal rate and regular rhythm. Exam reveals no decreased pulses.   Pulmonary/Chest: Effort normal.   Musculoskeletal:      Right elbow: Swelling  present. Tenderness present.      Left elbow: Normal.      Right wrist: Swelling and tenderness present.      Left wrist: Swelling and tenderness present.      Right knee: Tenderness present.      Left knee: Tenderness present.   Neurological: Gait normal.   Skin: No rash noted.   Psychiatric: Mood and affect normal.       Right Side Rheumatological Exam     Examination finds the 1st MCP, 4th MCP and 5th MCP normal.    The patient is tender to palpation of the elbow, wrist, knee, 1st PIP, 2nd PIP, 2nd MCP, 3rd PIP, 3rd MCP, 4th PIP and 5th PIP    She has swelling of the elbow and wrist    Left Side Rheumatological Exam     Examination finds the elbow, 1st MCP, 2nd MCP, 3rd MCP, 4th MCP and 5th MCP normal.    The patient is tender to palpation of the wrist, knee, 1st PIP, 2nd PIP, 3rd PIP, 4th PIP and 5th PIP.    She has swelling of the wrist            Labs reviewed:  Component      Latest Ref Rng 10/18/2024   Sodium      136 - 145 mmol/L 145    Potassium      3.5 - 5.1 mmol/L 3.4 (L)    Chloride      95 - 110 mmol/L 104    CO2      23 - 29 mmol/L 31 (H)    Glucose      70 - 110 mg/dL 87    BUN      6 - 20 mg/dL 14    Creatinine      0.5 - 1.4 mg/dL 0.8    Calcium      8.7 - 10.5 mg/dL 9.7    PROTEIN TOTAL      6.0 - 8.4 g/dL 7.1    Albumin      3.5 - 5.2 g/dL 4.3    BILIRUBIN TOTAL      0.1 - 1.0 mg/dL 0.6    ALP      55 - 135 U/L 61    AST      10 - 40 U/L 17    ALT      10 - 44 U/L 15    eGFR      >60 mL/min/1.73 m^2 >60.0    Anion Gap      8 - 16 mmol/L 10    Free T4      0.71 - 1.51 ng/dL 0.86    TSH      0.340 - 5.600 uIU/mL 0.729    CCP Antibodies      0 - 19 units 6    Rheumatoid Factor      0.0 - 15.0 IU/mL <10.0    CRP      <1.00 mg/dL 1.00 (H)       Imaging reviewed:  Narrative & Impression  EXAMINATION:  XR CERVICAL SPINE COMPLETE 5 VIEW     CLINICAL HISTORY:  Cervicalgia     FINDINGS:  Seven views of cervical spine show normal alignment. No fracture or destructive osseous lesion.     Mild C4-C5 and  moderate C5-C6 and C6-C7 disc degeneration is present.  No osseous neural foramen narrowing.     Left larger than right cervical ribs noted.  Paraspinal soft tissues are unremarkable.     Impression:     Cervical disc degeneration, most prominent at C5-C6 and C6-C7.  Assessment:       1. Seronegative rheumatoid arthritis    2. Cervical spondylosis    3. Type 2 diabetes mellitus with other specified complication, unspecified whether long term insulin use    4. On sulfasalazine therapy    5. Chronic pain syndrome                Plan:       Seronegative rheumatoid arthritis  -     CBC Auto Differential; Future; Expected date: 10/24/2024  -     Comprehensive Metabolic Panel; Future; Expected date: 10/24/2024  -     C-Reactive Protein; Future; Expected date: 10/24/2024  -     Sedimentation rate; Future; Expected date: 10/24/2024  -     ketorolac injection 60 mg  -     methylPREDNISolone acetate injection 160 mg  -     cyanocobalamin injection 1,000 mcg    Cervical spondylosis  -     cyclobenzaprine (FLEXERIL) 10 MG tablet; Take 1 tablet (10 mg total) by mouth 3 (three) times daily as needed for Muscle spasms.  Dispense: 90 tablet; Refill: 5    Type 2 diabetes mellitus with other specified complication, unspecified whether long term insulin use  -     Hemoglobin A1C; Future; Expected date: 10/24/2024    On sulfasalazine therapy    Chronic pain syndrome          Seronegative RA on SSZ  --osteoarthritis  --chronic pain syndrome  --vitamin D deficiency  --type 2 diabetes, HgbA1c 6.4%      1. toradol 60, depo 160, b12 today  2. Cont SSZ 1000 mg bid  3. Cont ergocalciferol 50,000 once weekly t  4. Cont percocet prn per MD.  I have checked [place] prescription monitoring program site and no unusual or abnormal behavior has occurred pt understand the risk and benefits of taking opioid medications and has decided to continue the medication. Pended x 3 months.  5. Cont gabapentin 600 mg tid  6. Cont elavil  7. D/C robaxin. Start  flexeril 10 mg tid prn neck pain  8. Cont ozempic 1 mg weekly    Follow up:  4 mo Dr. Caro w/labs prior

## 2024-10-28 RX ORDER — OXYCODONE AND ACETAMINOPHEN 7.5; 325 MG/1; MG/1
1 TABLET ORAL EVERY 6 HOURS PRN
Qty: 120 TABLET | Refills: 0 | Status: SHIPPED | OUTPATIENT
Start: 2024-12-02 | End: 2025-01-01

## 2024-10-28 RX ORDER — OXYCODONE AND ACETAMINOPHEN 7.5; 325 MG/1; MG/1
1 TABLET ORAL EVERY 6 HOURS PRN
Qty: 120 TABLET | Refills: 0 | Status: SHIPPED | OUTPATIENT
Start: 2024-12-31 | End: 2025-01-30

## 2024-10-28 RX ORDER — OXYCODONE AND ACETAMINOPHEN 7.5; 325 MG/1; MG/1
1 TABLET ORAL EVERY 6 HOURS PRN
Qty: 120 TABLET | Refills: 0 | Status: SHIPPED | OUTPATIENT
Start: 2024-11-01 | End: 2024-12-01

## 2024-10-31 ENCOUNTER — PATIENT MESSAGE (OUTPATIENT)
Dept: RHEUMATOLOGY | Facility: CLINIC | Age: 59
End: 2024-10-31
Payer: COMMERCIAL

## 2024-10-31 DIAGNOSIS — E11.69 TYPE 2 DIABETES MELLITUS WITH OTHER SPECIFIED COMPLICATION, UNSPECIFIED WHETHER LONG TERM INSULIN USE: ICD-10-CM

## 2024-10-31 DIAGNOSIS — Z79.899 IMMUNOCOMPROMISED STATE DUE TO DRUG THERAPY: ICD-10-CM

## 2024-10-31 DIAGNOSIS — D84.821 IMMUNOCOMPROMISED STATE DUE TO DRUG THERAPY: ICD-10-CM

## 2024-10-31 DIAGNOSIS — Z79.899 ON SULFASALAZINE THERAPY: ICD-10-CM

## 2024-10-31 DIAGNOSIS — G89.4 CHRONIC PAIN SYNDROME: ICD-10-CM

## 2024-10-31 DIAGNOSIS — M06.00 SERONEGATIVE RHEUMATOID ARTHRITIS: ICD-10-CM

## 2024-10-31 RX ORDER — OXYCODONE AND ACETAMINOPHEN 7.5; 325 MG/1; MG/1
1 TABLET ORAL EVERY 6 HOURS PRN
Qty: 120 TABLET | Refills: 0 | Status: CANCELLED | OUTPATIENT
Start: 2024-10-31 | End: 2024-11-30

## 2024-11-01 DIAGNOSIS — D84.821 IMMUNOCOMPROMISED STATE DUE TO DRUG THERAPY: ICD-10-CM

## 2024-11-01 DIAGNOSIS — E11.69 TYPE 2 DIABETES MELLITUS WITH OTHER SPECIFIED COMPLICATION, UNSPECIFIED WHETHER LONG TERM INSULIN USE: ICD-10-CM

## 2024-11-01 DIAGNOSIS — M06.00 SERONEGATIVE RHEUMATOID ARTHRITIS: ICD-10-CM

## 2024-11-01 DIAGNOSIS — G89.4 CHRONIC PAIN SYNDROME: ICD-10-CM

## 2024-11-01 DIAGNOSIS — Z79.899 IMMUNOCOMPROMISED STATE DUE TO DRUG THERAPY: ICD-10-CM

## 2024-11-01 DIAGNOSIS — Z79.899 ON SULFASALAZINE THERAPY: ICD-10-CM

## 2024-11-01 RX ORDER — OXYCODONE AND ACETAMINOPHEN 7.5; 325 MG/1; MG/1
1 TABLET ORAL EVERY 6 HOURS PRN
Qty: 120 TABLET | Refills: 0 | Status: CANCELLED | OUTPATIENT
Start: 2024-10-31 | End: 2024-11-30

## 2024-11-04 DIAGNOSIS — Z79.899 IMMUNOCOMPROMISED STATE DUE TO DRUG THERAPY: ICD-10-CM

## 2024-11-04 DIAGNOSIS — M06.00 SERONEGATIVE RHEUMATOID ARTHRITIS: ICD-10-CM

## 2024-11-04 DIAGNOSIS — D84.821 IMMUNOCOMPROMISED STATE DUE TO DRUG THERAPY: ICD-10-CM

## 2024-11-04 DIAGNOSIS — Z79.899 ON SULFASALAZINE THERAPY: ICD-10-CM

## 2024-11-04 DIAGNOSIS — E11.69 TYPE 2 DIABETES MELLITUS WITH OTHER SPECIFIED COMPLICATION, UNSPECIFIED WHETHER LONG TERM INSULIN USE: ICD-10-CM

## 2024-11-04 DIAGNOSIS — G89.4 CHRONIC PAIN SYNDROME: ICD-10-CM

## 2024-11-04 RX ORDER — OXYCODONE AND ACETAMINOPHEN 7.5; 325 MG/1; MG/1
1 TABLET ORAL EVERY 6 HOURS PRN
Qty: 120 TABLET | Refills: 0 | OUTPATIENT
Start: 2024-11-04 | End: 2024-12-04

## 2024-11-10 ENCOUNTER — OFFICE VISIT (OUTPATIENT)
Dept: URGENT CARE | Facility: CLINIC | Age: 59
End: 2024-11-10
Payer: COMMERCIAL

## 2024-11-10 VITALS
DIASTOLIC BLOOD PRESSURE: 88 MMHG | HEIGHT: 64 IN | SYSTOLIC BLOOD PRESSURE: 146 MMHG | RESPIRATION RATE: 18 BRPM | OXYGEN SATURATION: 97 % | BODY MASS INDEX: 31.92 KG/M2 | HEART RATE: 76 BPM | TEMPERATURE: 98 F | WEIGHT: 187 LBS

## 2024-11-10 DIAGNOSIS — R35.0 FREQUENCY OF URINATION: Primary | ICD-10-CM

## 2024-11-10 DIAGNOSIS — N30.00 ACUTE CYSTITIS WITHOUT HEMATURIA: ICD-10-CM

## 2024-11-10 LAB
BILIRUB UR QL STRIP: POSITIVE
GLUCOSE UR QL STRIP: NEGATIVE
KETONES UR QL STRIP: NEGATIVE
LEUKOCYTE ESTERASE UR QL STRIP: POSITIVE
PH, POC UA: 6
POC BLOOD, URINE: NEGATIVE
POC NITRATES, URINE: POSITIVE
PROT UR QL STRIP: POSITIVE
SP GR UR STRIP: 1.02 (ref 1–1.03)
UROBILINOGEN UR STRIP-ACNC: 0.2 (ref 0.1–1.1)

## 2024-11-10 PROCEDURE — 81003 URINALYSIS AUTO W/O SCOPE: CPT | Mod: QW,S$GLB,, | Performed by: STUDENT IN AN ORGANIZED HEALTH CARE EDUCATION/TRAINING PROGRAM

## 2024-11-10 PROCEDURE — 99214 OFFICE O/P EST MOD 30 MIN: CPT | Mod: S$GLB,,, | Performed by: STUDENT IN AN ORGANIZED HEALTH CARE EDUCATION/TRAINING PROGRAM

## 2024-11-10 RX ORDER — FLUCONAZOLE 150 MG/1
150 TABLET ORAL DAILY
Qty: 2 TABLET | Refills: 0 | Status: SHIPPED | OUTPATIENT
Start: 2024-11-10 | End: 2024-11-12

## 2024-11-10 RX ORDER — CEPHALEXIN 500 MG/1
500 CAPSULE ORAL EVERY 12 HOURS
Qty: 14 CAPSULE | Refills: 0 | Status: SHIPPED | OUTPATIENT
Start: 2024-11-10 | End: 2024-11-17

## 2024-11-10 NOTE — PROGRESS NOTES
"Subjective:      Patient ID: Susana Andersen is a 59 y.o. female.    Vitals:  height is 5' 4" (1.626 m) and weight is 84.8 kg (187 lb). Her oral temperature is 97.8 °F (36.6 °C). Her blood pressure is 146/88 (abnormal) and her pulse is 76. Her respiration is 18 and oxygen saturation is 97%.     Chief Complaint: Urinary Tract Infection    Ambulatory to room with complaint of dysuria urgency and frequency times several days now.    Urinary Tract Infection   This is a new problem. The current episode started 1 to 4 weeks ago (10days). The problem occurs every urination. The problem has been gradually worsening. The quality of the pain is described as burning. The pain is at a severity of 7/10. The pain is moderate. There has been no fever. She is Sexually active. There is No history of pyelonephritis. Associated symptoms include flank pain, frequency and urgency. Treatments tried: azo/ The treatment provided no relief.       Genitourinary:  Positive for dysuria, frequency, urgency and flank pain.      Objective:     Physical Exam   Constitutional: She is oriented to person, place, and time. She appears well-developed.   HENT:   Head: Normocephalic and atraumatic.   Ears:   Right Ear: External ear normal.   Left Ear: External ear normal.   Nose: Nose normal.   Mouth/Throat: Mucous membranes are normal.   Eyes: Conjunctivae and lids are normal.   Neck: Trachea normal. Neck supple.   Cardiovascular: Normal rate, regular rhythm and normal heart sounds.   Pulmonary/Chest: Effort normal and breath sounds normal. No respiratory distress.   Abdominal: Normal appearance and bowel sounds are normal. She exhibits no distension and no mass. Soft. There is no abdominal tenderness. There is no left CVA tenderness and no right CVA tenderness.   Musculoskeletal: Normal range of motion.         General: Normal range of motion.   Neurological: She is alert and oriented to person, place, and time. She has normal strength.   Skin: Skin is " warm, dry, intact, not diaphoretic and not pale.   Psychiatric: Her speech is normal and behavior is normal. Judgment and thought content normal.   Nursing note and vitals reviewed.      Assessment:     1. Frequency of urination    2. Acute cystitis without hematuria        Plan:       Frequency of urination  -     POCT Urinalysis, Dipstick, Automated, W/O Scope    Acute cystitis without hematuria  -     Urine culture    Other orders  -     cephALEXin (KEFLEX) 500 MG capsule; Take 1 capsule (500 mg total) by mouth every 12 (twelve) hours. for 7 days  Dispense: 14 capsule; Refill: 0  -     fluconazole (DIFLUCAN) 150 MG Tab; Take 1 tablet (150 mg total) by mouth once daily. 1 tab po q 72 hours x 2 doses. for 2 days  Dispense: 2 tablet; Refill: 0           Empiric antibiotic.  Encourage clear fluid intake.  Follow up with urine culture.  Patient to follow up as needed at this clinic or primary.  Instructions given for when to present to the ED.  Patient states prone to yeast infections with antibiotic use.  - To ED for any new or acutely worsening symptoms including but not limited to chest pain, palpitations, shortness of breath, or fever greater than 103° F.  Patient in agreement with plan of care.    - The diagnosis, treatment plan, instructions for follow-up and reevaluation as well as ED precautions were discussed and understanding was verbalized. All questions or concerns have been addressed.  -Follow up with your primary care provider for continued evaluation and management.

## 2024-11-10 NOTE — PATIENT INSTRUCTIONS
Thankyou for the opportunity to take care of you today.  Please take all medications as directed, continue any previous prescribed medications unless we specifically discussed holding them.  If  your situation becomes emergent please present to the nearest emergency department.  Follow-up with your PCP for continued evaluation and management.

## 2024-11-15 LAB
BACTERIA UR CULT: ABNORMAL
BACTERIA UR CULT: ABNORMAL
OTHER ANTIBIOTIC SUSC ISLT: ABNORMAL

## 2025-01-24 ENCOUNTER — LAB VISIT (OUTPATIENT)
Dept: LAB | Facility: HOSPITAL | Age: 60
End: 2025-01-24
Attending: PHYSICIAN ASSISTANT
Payer: COMMERCIAL

## 2025-01-24 DIAGNOSIS — E11.69 TYPE 2 DIABETES MELLITUS WITH OTHER SPECIFIED COMPLICATION, UNSPECIFIED WHETHER LONG TERM INSULIN USE: ICD-10-CM

## 2025-01-24 DIAGNOSIS — M06.00 SERONEGATIVE RHEUMATOID ARTHRITIS: ICD-10-CM

## 2025-01-24 LAB
ALBUMIN SERPL BCP-MCNC: 4 G/DL (ref 3.5–5.2)
ALP SERPL-CCNC: 65 U/L (ref 55–135)
ALT SERPL W/O P-5'-P-CCNC: 14 U/L (ref 10–44)
ANION GAP SERPL CALC-SCNC: 6 MMOL/L (ref 8–16)
AST SERPL-CCNC: 13 U/L (ref 10–40)
BASOPHILS # BLD AUTO: 0.03 K/UL (ref 0–0.2)
BASOPHILS NFR BLD: 0.5 % (ref 0–1.9)
BILIRUB SERPL-MCNC: 0.4 MG/DL (ref 0.1–1)
BUN SERPL-MCNC: 14 MG/DL (ref 6–20)
CALCIUM SERPL-MCNC: 9.8 MG/DL (ref 8.7–10.5)
CHLORIDE SERPL-SCNC: 103 MMOL/L (ref 95–110)
CO2 SERPL-SCNC: 32 MMOL/L (ref 23–29)
CREAT SERPL-MCNC: 0.9 MG/DL (ref 0.5–1.4)
CRP SERPL-MCNC: 0.7 MG/DL
DIFFERENTIAL METHOD BLD: ABNORMAL
EOSINOPHIL # BLD AUTO: 0.1 K/UL (ref 0–0.5)
EOSINOPHIL NFR BLD: 1.4 % (ref 0–8)
ERYTHROCYTE [DISTWIDTH] IN BLOOD BY AUTOMATED COUNT: 13.9 % (ref 11.5–14.5)
ERYTHROCYTE [SEDIMENTATION RATE] IN BLOOD BY WESTERGREN METHOD: 6 MM/HR (ref 0–20)
EST. GFR  (NO RACE VARIABLE): >60 ML/MIN/1.73 M^2
ESTIMATED AVG GLUCOSE: 128 MG/DL (ref 68–131)
GLUCOSE SERPL-MCNC: 91 MG/DL (ref 70–110)
HBA1C MFR BLD: 6.1 % (ref 4.5–6.2)
HCT VFR BLD AUTO: 42.1 % (ref 37–48.5)
HGB BLD-MCNC: 13.3 G/DL (ref 12–16)
IMM GRANULOCYTES # BLD AUTO: 0.01 K/UL (ref 0–0.04)
IMM GRANULOCYTES NFR BLD AUTO: 0.2 % (ref 0–0.5)
LYMPHOCYTES # BLD AUTO: 2.4 K/UL (ref 1–4.8)
LYMPHOCYTES NFR BLD: 41.8 % (ref 18–48)
MCH RBC QN AUTO: 27.5 PG (ref 27–31)
MCHC RBC AUTO-ENTMCNC: 31.6 G/DL (ref 32–36)
MCV RBC AUTO: 87 FL (ref 82–98)
MONOCYTES # BLD AUTO: 0.4 K/UL (ref 0.3–1)
MONOCYTES NFR BLD: 7 % (ref 4–15)
NEUTROPHILS # BLD AUTO: 2.9 K/UL (ref 1.8–7.7)
NEUTROPHILS NFR BLD: 49.1 % (ref 38–73)
NRBC BLD-RTO: 0 /100 WBC
PLATELET # BLD AUTO: 319 K/UL (ref 150–450)
PMV BLD AUTO: 9.6 FL (ref 9.2–12.9)
POTASSIUM SERPL-SCNC: 4.1 MMOL/L (ref 3.5–5.1)
PROT SERPL-MCNC: 7 G/DL (ref 6–8.4)
RBC # BLD AUTO: 4.84 M/UL (ref 4–5.4)
SODIUM SERPL-SCNC: 141 MMOL/L (ref 136–145)
WBC # BLD AUTO: 5.84 K/UL (ref 3.9–12.7)

## 2025-01-24 PROCEDURE — 83036 HEMOGLOBIN GLYCOSYLATED A1C: CPT | Performed by: PHYSICIAN ASSISTANT

## 2025-01-24 PROCEDURE — 36415 COLL VENOUS BLD VENIPUNCTURE: CPT | Performed by: PHYSICIAN ASSISTANT

## 2025-01-24 PROCEDURE — 80053 COMPREHEN METABOLIC PANEL: CPT | Performed by: PHYSICIAN ASSISTANT

## 2025-01-24 PROCEDURE — 85651 RBC SED RATE NONAUTOMATED: CPT | Performed by: PHYSICIAN ASSISTANT

## 2025-01-24 PROCEDURE — 85025 COMPLETE CBC W/AUTO DIFF WBC: CPT | Performed by: PHYSICIAN ASSISTANT

## 2025-01-24 PROCEDURE — 86140 C-REACTIVE PROTEIN: CPT | Performed by: PHYSICIAN ASSISTANT

## 2025-01-29 ENCOUNTER — OFFICE VISIT (OUTPATIENT)
Dept: RHEUMATOLOGY | Facility: CLINIC | Age: 60
End: 2025-01-29
Payer: COMMERCIAL

## 2025-01-29 VITALS
BODY MASS INDEX: 33.12 KG/M2 | DIASTOLIC BLOOD PRESSURE: 83 MMHG | WEIGHT: 194 LBS | SYSTOLIC BLOOD PRESSURE: 122 MMHG | HEART RATE: 83 BPM | HEIGHT: 64 IN

## 2025-01-29 DIAGNOSIS — J30.2 SEASONAL ALLERGIES: ICD-10-CM

## 2025-01-29 DIAGNOSIS — G47.00 INSOMNIA, UNSPECIFIED TYPE: ICD-10-CM

## 2025-01-29 DIAGNOSIS — M54.2 NECK PAIN, ACUTE: ICD-10-CM

## 2025-01-29 DIAGNOSIS — M06.00 SERONEGATIVE RHEUMATOID ARTHRITIS: ICD-10-CM

## 2025-01-29 DIAGNOSIS — M47.812 CERVICAL SPONDYLOSIS: ICD-10-CM

## 2025-01-29 DIAGNOSIS — R30.0 DYSURIA: Primary | ICD-10-CM

## 2025-01-29 DIAGNOSIS — E11.69 TYPE 2 DIABETES MELLITUS WITH OTHER SPECIFIED COMPLICATION, UNSPECIFIED WHETHER LONG TERM INSULIN USE: ICD-10-CM

## 2025-01-29 DIAGNOSIS — E55.9 VITAMIN D DEFICIENCY: ICD-10-CM

## 2025-01-29 DIAGNOSIS — G89.4 CHRONIC PAIN SYNDROME: ICD-10-CM

## 2025-01-29 PROCEDURE — 1160F RVW MEDS BY RX/DR IN RCRD: CPT | Mod: CPTII,S$GLB,, | Performed by: INTERNAL MEDICINE

## 2025-01-29 PROCEDURE — 3008F BODY MASS INDEX DOCD: CPT | Mod: CPTII,S$GLB,, | Performed by: INTERNAL MEDICINE

## 2025-01-29 PROCEDURE — 4010F ACE/ARB THERAPY RXD/TAKEN: CPT | Mod: CPTII,S$GLB,, | Performed by: INTERNAL MEDICINE

## 2025-01-29 PROCEDURE — 3074F SYST BP LT 130 MM HG: CPT | Mod: CPTII,S$GLB,, | Performed by: INTERNAL MEDICINE

## 2025-01-29 PROCEDURE — 99215 OFFICE O/P EST HI 40 MIN: CPT | Mod: 25,S$GLB,, | Performed by: INTERNAL MEDICINE

## 2025-01-29 PROCEDURE — 3079F DIAST BP 80-89 MM HG: CPT | Mod: CPTII,S$GLB,, | Performed by: INTERNAL MEDICINE

## 2025-01-29 PROCEDURE — 3044F HG A1C LEVEL LT 7.0%: CPT | Mod: CPTII,S$GLB,, | Performed by: INTERNAL MEDICINE

## 2025-01-29 PROCEDURE — 1159F MED LIST DOCD IN RCRD: CPT | Mod: CPTII,S$GLB,, | Performed by: INTERNAL MEDICINE

## 2025-01-29 PROCEDURE — 99999 PR PBB SHADOW E&M-EST. PATIENT-LVL III: CPT | Mod: PBBFAC,,, | Performed by: INTERNAL MEDICINE

## 2025-01-29 PROCEDURE — 96372 THER/PROPH/DIAG INJ SC/IM: CPT | Mod: S$GLB,,, | Performed by: INTERNAL MEDICINE

## 2025-01-29 RX ORDER — GRANULES FOR ORAL 3 G/1
3 POWDER ORAL ONCE
Qty: 3 G | Refills: 0 | Status: SHIPPED | OUTPATIENT
Start: 2025-01-29 | End: 2025-01-29 | Stop reason: SDUPTHER

## 2025-01-29 RX ORDER — OXYCODONE AND ACETAMINOPHEN 10; 325 MG/1; MG/1
1 TABLET ORAL EVERY 8 HOURS PRN
Qty: 90 TABLET | Refills: 0 | Status: SHIPPED | OUTPATIENT
Start: 2025-03-01 | End: 2025-03-31

## 2025-01-29 RX ORDER — MONTELUKAST SODIUM 10 MG/1
10 TABLET ORAL NIGHTLY
Qty: 90 TABLET | Refills: 3 | Status: SHIPPED | OUTPATIENT
Start: 2025-01-29

## 2025-01-29 RX ORDER — SULFASALAZINE 500 MG/1
1000 TABLET, DELAYED RELEASE ORAL 2 TIMES DAILY
Qty: 360 TABLET | Refills: 3 | Status: SHIPPED | OUTPATIENT
Start: 2025-01-29 | End: 2026-01-29

## 2025-01-29 RX ORDER — KETOROLAC TROMETHAMINE 30 MG/ML
60 INJECTION, SOLUTION INTRAMUSCULAR; INTRAVENOUS
Status: COMPLETED | OUTPATIENT
Start: 2025-01-29 | End: 2025-01-29

## 2025-01-29 RX ORDER — ERGOCALCIFEROL 1.25 MG/1
50000 CAPSULE ORAL
Qty: 12 CAPSULE | Refills: 1 | Status: SHIPPED | OUTPATIENT
Start: 2025-01-29

## 2025-01-29 RX ORDER — CYCLOBENZAPRINE HCL 10 MG
10 TABLET ORAL 3 TIMES DAILY PRN
Qty: 90 TABLET | Refills: 5 | Status: SHIPPED | OUTPATIENT
Start: 2025-01-29

## 2025-01-29 RX ORDER — OXYCODONE AND ACETAMINOPHEN 10; 325 MG/1; MG/1
1 TABLET ORAL EVERY 8 HOURS PRN
Qty: 90 TABLET | Refills: 0 | Status: SHIPPED | OUTPATIENT
Start: 2025-04-01 | End: 2025-05-01

## 2025-01-29 RX ORDER — CYANOCOBALAMIN 1000 UG/ML
1000 INJECTION, SOLUTION INTRAMUSCULAR; SUBCUTANEOUS
Status: COMPLETED | OUTPATIENT
Start: 2025-01-29 | End: 2025-01-29

## 2025-01-29 RX ORDER — METHYLPREDNISOLONE ACETATE 80 MG/ML
160 INJECTION, SUSPENSION INTRA-ARTICULAR; INTRALESIONAL; INTRAMUSCULAR; SOFT TISSUE
Status: COMPLETED | OUTPATIENT
Start: 2025-01-29 | End: 2025-01-29

## 2025-01-29 RX ORDER — PREDNISONE 2.5 MG/1
2.5 TABLET ORAL DAILY
Qty: 90 TABLET | Refills: 2 | Status: SHIPPED | OUTPATIENT
Start: 2025-01-29

## 2025-01-29 RX ORDER — GRANULES FOR ORAL 3 G/1
3 POWDER ORAL ONCE
Qty: 3 G | Refills: 0 | Status: SHIPPED | OUTPATIENT
Start: 2025-01-29 | End: 2025-01-31

## 2025-01-29 RX ORDER — AMITRIPTYLINE HYDROCHLORIDE 10 MG/1
10 TABLET, FILM COATED ORAL NIGHTLY PRN
Qty: 90 TABLET | Refills: 2 | Status: SHIPPED | OUTPATIENT
Start: 2025-01-29 | End: 2025-01-29

## 2025-01-29 RX ORDER — MIRTAZAPINE 7.5 MG/1
7.5 TABLET, FILM COATED ORAL NIGHTLY
Qty: 90 TABLET | Refills: 3 | Status: SHIPPED | OUTPATIENT
Start: 2025-01-29 | End: 2026-01-29

## 2025-01-29 RX ORDER — GABAPENTIN 600 MG/1
600 TABLET ORAL 3 TIMES DAILY
Qty: 270 TABLET | Refills: 1 | Status: SHIPPED | OUTPATIENT
Start: 2025-01-29 | End: 2026-01-29

## 2025-01-29 RX ORDER — SEMAGLUTIDE 2.68 MG/ML
2 INJECTION, SOLUTION SUBCUTANEOUS
Qty: 3 ML | Refills: 11 | Status: SHIPPED | OUTPATIENT
Start: 2025-01-29 | End: 2026-01-29

## 2025-01-29 RX ORDER — SEMAGLUTIDE 1.34 MG/ML
1 INJECTION, SOLUTION SUBCUTANEOUS
Qty: 3 ML | Refills: 11 | Status: SHIPPED | OUTPATIENT
Start: 2025-01-29 | End: 2025-01-29

## 2025-01-29 RX ORDER — OXYCODONE AND ACETAMINOPHEN 10; 325 MG/1; MG/1
1 TABLET ORAL EVERY 8 HOURS PRN
Qty: 90 TABLET | Refills: 0 | Status: SHIPPED | OUTPATIENT
Start: 2025-02-01 | End: 2025-03-03

## 2025-01-29 RX ADMIN — CYANOCOBALAMIN 1000 MCG: 1000 INJECTION, SOLUTION INTRAMUSCULAR; SUBCUTANEOUS at 03:01

## 2025-01-29 RX ADMIN — METHYLPREDNISOLONE ACETATE 160 MG: 80 INJECTION, SUSPENSION INTRA-ARTICULAR; INTRALESIONAL; INTRAMUSCULAR; SOFT TISSUE at 03:01

## 2025-01-29 RX ADMIN — KETOROLAC TROMETHAMINE 60 MG: 30 INJECTION, SOLUTION INTRAMUSCULAR; INTRAVENOUS at 03:01

## 2025-01-29 NOTE — PROGRESS NOTES
Hand hygiene performed. Dr. Caro ordered injections and patient consented. Patient was given Toradol 60 mg and Depo-Medrol 160 mg IM injection in separate syringes in her right outer gluteal muscle along with B-12 1000 mcg IM given in her left outer gluteal muscle. Patient tolerated injections and was advised to wait 15 minutes for observation. Patient was told if she should feel abnormal please report to the nearest ER or UC for treatment. Patient stated she understood.

## 2025-01-29 NOTE — PROGRESS NOTES
Subjective:     Patient ID:  Susana Andersen    Chief Complaint:  Disease Management     History of Present Illness:   Mrs. Andersen is a 59 year old female who presents to clinic for follow up on seronegative rheumatoid arthritis. She has been compliant with SSZ 1000 mg bid. She is taking prednisone 2.5 mg PRN for severe flares. She had a flare in her R elbow and more intense pain in her hands recently. She feels pain is worse with overuse.   She is not ready to consider more aggressive treatment for RA. She is taking percocet tid prn for severe pain and this is helping.      We reviewed her recent labs CRP is elevated at 1.0     Current tx:  1. SSZ  2. Norco  3. Gabapentin        Rheumatologic History:   - Diagnosis/es:  - Positive serologies:  - Infectious screening labs:  - Previous Treatments:  - Current Treatments:     Interval History:   Hospitalization since last office visit: No    Patient Active Problem List    Diagnosis Date Noted    Peripheral edema 12/01/2023    Abnormal nuclear stress test 09/27/2023    Essential hypertension 06/06/2023    Dyslipidemia 06/06/2023    Nonspecific abnormal electrocardiogram (ECG) (EKG) 06/06/2023    History of colon polyps 07/29/2021    Seronegative rheumatoid arthritis 10/22/2020     Past Surgical History:   Procedure Laterality Date    COLONOSCOPY N/A 7/29/2021    Procedure: COLONOSCOPY;  Surgeon: Edgar Bolaños MD;  Location: [facility];  Service: Endoscopy;  Laterality: N/A;    COLONOSCOPY W/ BIOPSIES       Social History     Tobacco Use    Smoking status: Never    Smokeless tobacco: Never   Substance Use Topics    Alcohol use: Yes     Alcohol/week: 1.0 standard drink of alcohol     Types: 1 Glasses of wine per week    Drug use: Never     Family History   Problem Relation Name Age of Onset    Rheum arthritis Mother      Diabetes Mellitus Mother      Stroke Mother      Diabetes Mellitus Father      Stroke Father      Hypertension Sister      Rheum arthritis Sister       Hypertension Brother      Hypertension Son       Review of patient's allergies indicates:  No Known Allergies    Review of Systems   Review of Systems   Constitutional:  Positive for chills and fatigue. Negative for activity change, appetite change, diaphoresis, fever and unexpected weight change.   HENT:  Negative for congestion, dental problem, ear discharge, ear pain, facial swelling, mouth sores, nosebleeds, postnasal drip, rhinorrhea, sinus pressure, sneezing, sore throat, tinnitus, trouble swallowing and voice change.    Eyes:  Negative for photophobia, pain, discharge, redness and itching.   Respiratory:  Positive for cough. Negative for apnea, chest tightness, shortness of breath and wheezing.    Cardiovascular:  Positive for leg swelling. Negative for chest pain and palpitations.   Gastrointestinal:  Positive for abdominal pain. Negative for abdominal distention, constipation, diarrhea, nausea and vomiting.   Endocrine: Negative for cold intolerance, heat intolerance, polydipsia and polyuria.   Genitourinary:  Negative for decreased urine volume, difficulty urinating, dysuria, flank pain, frequency, hematuria and urgency.   Musculoskeletal:  Positive for arthralgias, back pain, gait problem, joint swelling, myalgias, neck pain and neck stiffness.   Skin:  Negative for pallor, rash and wound.   Allergic/Immunologic: Negative for immunocompromised state.   Neurological:  Negative for dizziness, tremors, numbness and headaches.   Hematological:  Negative for adenopathy. Does not bruise/bleed easily.   Psychiatric/Behavioral:  Negative for sleep disturbance. The patient is not nervous/anxious.         Current Medications:  Current Outpatient Medications   Medication Instructions    aspirin (ECOTRIN) 81 mg, Daily    atorvastatin (LIPITOR) 20 mg, Daily    azelastine (ASTELIN) 137 mcg (0.1 %) nasal spray Use 1 spray (137 mcg total) by Nasal route 2 (two) times daily.    cetirizine (ZYRTEC) 10 mg, Oral, Daily     "cyclobenzaprine (FLEXERIL) 10 mg, Oral, 3 times daily PRN    diclofenac sodium (VOLTAREN) 1 % Gel Daily PRN    fluconazole (DIFLUCAN) 150 mg, Daily    gabapentin (NEURONTIN) 600 mg, Oral, 3 times daily    hydroCHLOROthiazide (HYDRODIURIL) 25 mg, Oral, Daily    levocetirizine (XYZAL) 5 mg, Nightly    losartan (COZAAR) 50 mg, Oral, Daily    metoprolol tartrate (LOPRESSOR) 25 mg, Oral, 2 times daily PRN    mirtazapine (REMERON) 7.5 mg, Oral, Nightly    montelukast (SINGULAIR) 10 mg, Oral, Nightly    [START ON 3/1/2025] oxyCODONE-acetaminophen (PERCOCET)  mg per tablet 1 tablet, Oral, Every 8 hours PRN    oxyCODONE-acetaminophen (PERCOCET)  mg per tablet 1 tablet, Oral, Every 8 hours PRN    [START ON 4/1/2025] oxyCODONE-acetaminophen (PERCOCET)  mg per tablet 1 tablet, Oral, Every 8 hours PRN    OZEMPIC 2 mg, Subcutaneous, Every 7 days    predniSONE (DELTASONE) 2.5 mg, Oral, Daily    sulfaSALAzine (AZULFIDINE ENTABS) 1,000 mg, Oral, 2 times daily    VITAMIN D2 50,000 Units, Oral, Every 7 days         Objective:     Vitals:    01/29/25 1334   BP: 122/83   Pulse: 83   Weight: 88 kg (194 lb 0.1 oz)   Height: 5' 4.02" (1.626 m)   PainSc:   6   PainLoc: Arm      Body mass index is 33.28 kg/m².     Physical Examinations:  Physical Exam   Constitutional: She is oriented to person, place, and time.   HENT:   Head: Normocephalic and atraumatic.   Mouth/Throat: Oropharynx is clear and moist.   Eyes: Pupils are equal, round, and reactive to light.   Neck: No thyromegaly present.   Cardiovascular: Normal rate, regular rhythm and normal heart sounds. Exam reveals no gallop and no friction rub.   No murmur heard.  Pulmonary/Chest: Breath sounds normal. She has no wheezes. She has no rales. She exhibits no tenderness.   Abdominal: There is no abdominal tenderness. There is no rebound and no guarding.   Musculoskeletal:         General: Tenderness and deformity present.      Right shoulder: Tenderness present.      " Left shoulder: Tenderness present.      Right elbow: Normal.      Left elbow: Normal.      Cervical back: Neck supple.      Right knee: Swelling and effusion present. Tenderness present.      Left knee: Effusion present.      Left lower leg: Edema present.   Lymphadenopathy:     She has no cervical adenopathy.   Neurological: She is alert and oriented to person, place, and time. She has normal sensation. Gait normal.   Reflex Scores:       Patellar reflexes are 3+ on the right side and 3+ on the left side.  Skin: No rash noted. No erythema. No pallor.   Psychiatric: Mood and affect normal.   Vitals reviewed.      Right Side Rheumatological Exam     Examination finds the elbow, 1st MCP, 2nd MCP, 3rd MCP, 4th MCP and 5th MCP normal.    The patient is tender to palpation of the shoulder and knee    She has swelling of the knee    The patient has an enlarged wrist, 1st PIP, 2nd PIP, 3rd PIP, 4th PIP and 5th PIP    Shoulder Exam   Tenderness Location: acromion    Range of Motion   Active abduction:  abnormal   Adduction: abnormal  Sensation: normal    Knee Exam   Tenderness Location: medial joint line  Patellofemoral Crepitus: positive  Effusion: positive  Sensation: normal    Hip Exam   Tenderness Location: no tenderness  Sensation: normal    Elbow/Wrist Exam   Tenderness Location: no tenderness  Sensation: normal    Left Side Rheumatological Exam     Examination finds the elbow, 1st MCP, 2nd MCP, 3rd MCP, 4th MCP and 5th MCP normal.    The patient is tender to palpation of the shoulder.    The patient has an enlarged wrist, 1st PIP, 2nd PIP, 3rd PIP, 4th PIP and 5th PIP.    Shoulder Exam   Tenderness Location: acromion    Range of Motion   Active abduction:  abnormal   Sensation: normal    Knee Exam   Tenderness Location: lateral joint line and medial joint line    Patellofemoral Crepitus: positive  Effusion: positive  Sensation: normal    Hip Exam   Tenderness Location: no tenderness  Sensation: normal    Elbow/Wrist  "Exam   Sensation: normal      Back/Neck Exam   General Inspection   Gait: normal       Tenderness Right paramedian tenderness of the Lower C-Spine and Lower L-Spine.Left paramedian tenderness of the Upper C-Spine and Lower L-Spine.         Disease Assessment Scores:  Patient's Global Assessment of arthritis (0-10): 1  Physician's Global Assessment of arthritis (0-10): 1  Number of Tender Joints (0-28): 2  Number of Swollen Joints (0-28): 2         No data to display                Monitoring Lab Results:  Lab Results   Component Value Date    WBC 5.84 01/24/2025    RBC 4.84 01/24/2025    HGB 13.3 01/24/2025    HCT 42.1 01/24/2025    MCV 87 01/24/2025    MCH 27.5 01/24/2025    MCHC 31.6 (L) 01/24/2025    RDW 13.9 01/24/2025     01/24/2025        Lab Results   Component Value Date     01/24/2025    K 4.1 01/24/2025     01/24/2025    CO2 32 (H) 01/24/2025    GLU 91 01/24/2025    BUN 14 01/24/2025    CREATININE 0.9 01/24/2025    CALCIUM 9.8 01/24/2025    PROT 7.0 01/24/2025    ALBUMIN 4.0 01/24/2025    BILITOT 0.4 01/24/2025    ALKPHOS 65 01/24/2025    AST 13 01/24/2025    ALT 14 01/24/2025    ANIONGAP 6 (L) 01/24/2025    EGFRNORACEVR >60.0 01/24/2025       Lab Results   Component Value Date    SEDRATE 6 01/24/2025    CRP 0.70 01/24/2025        Lab Results   Component Value Date    VBQGZOPX34HD 52 02/23/2024        Lab Results   Component Value Date    CHOL 116 (L) 10/07/2023    HDL 36 (L) 10/07/2023    LDLCALC 47.2 (L) 10/07/2023    TRIG 164 (H) 10/07/2023       Lab Results   Component Value Date    RF <10.0 10/18/2024    CCPANTIBODIE 6 10/18/2024     Lab Results   Component Value Date    ANASCREEN Negative <1:80 09/10/2020    DSDNA Negative 1:10 09/10/2020     No results found for: "HLABB27"    Infectious Disease Screening:  Lab Results   Component Value Date    HEPBSAG Negative 09/10/2020    HEPBSAB Negative 09/10/2020    HEPBIGM Negative 09/10/2020     Lab Results   Component Value Date    HEPCAB " "Negative 09/10/2020     Lab Results   Component Value Date    TBGOLDPLUS Negative 09/10/2020     No results found for: "QUANTIFERON", "SVCMT", "QUANTAGVALUE", "QUANTNILVALU", "QUANTMITOGEN", "QFTTBAG", "QINT"     Imaging: DEXA, Xrays, MRIs, CTs, etc    Old & Outside Medical Records:  Reviewed old and all outside medical records available in Care Everywhere     Assessment:     Encounter Diagnoses   Name Primary?    Dysuria Yes    Seronegative rheumatoid arthritis     Type 2 diabetes mellitus with other specified complication, unspecified whether long term insulin use     Seasonal allergies     Chronic pain syndrome     Neck pain, acute     Vitamin D deficiency     Cervical spondylosis     Insomnia, unspecified type         Plan:      Encounter Diagnoses   Name Primary?    Seronegative rheumatoid arthritis     Type 2 diabetes mellitus with other specified complication, unspecified whether long term insulin use     Seasonal allergies     Chronic pain syndrome     Neck pain, acute     Vitamin D deficiency     Cervical spondylosis     Insomnia, unspecified type     Dysuria Yes     Susana was seen today for disease management.    Diagnoses and all orders for this visit:    Dysuria  -     Discontinue: fosfomycin (MONUROL) 3 gram Pack; Take 3 g by mouth once. for 1 dose  -     fosfomycin (MONUROL) 3 gram Pack; Mix 1 packet (3 g) by mouth once. for 1 dose    Seronegative rheumatoid arthritis  -     sulfaSALAzine (AZULFIDINE ENTABS) 500 MG EC tablet; Take 2 tablets (1,000 mg total) by mouth 2 (two) times daily.  -     Discontinue: semaglutide (OZEMPIC) 1 mg/dose (4 mg/3 mL); Inject 1 mg into the skin every 7 days.  -     predniSONE (DELTASONE) 2.5 MG tablet; Take 1 tablet (2.5 mg total) by mouth once daily.  -     montelukast (SINGULAIR) 10 mg tablet; Take 1 tablet (10 mg total) by mouth every evening.  -     gabapentin (NEURONTIN) 600 MG tablet; Take 1 tablet (600 mg total) by mouth 3 (three) times daily.  -     " ergocalciferol (ERGOCALCIFEROL) 50,000 unit Cap; Take 1 capsule (50,000 Units total) by mouth every 7 days.  -     cyclobenzaprine (FLEXERIL) 10 MG tablet; Take 1 tablet (10 mg total) by mouth 3 (three) times daily as needed for Muscle spasms.  -     oxyCODONE-acetaminophen (PERCOCET)  mg per tablet; Take 1 tablet by mouth every 8 (eight) hours as needed for Pain.  -     oxyCODONE-acetaminophen (PERCOCET)  mg per tablet; Take 1 tablet by mouth every 8 (eight) hours as needed for Pain.  -     oxyCODONE-acetaminophen (PERCOCET)  mg per tablet; Take 1 tablet by mouth every 8 (eight) hours as needed for Pain.  -     ketorolac injection 60 mg  -     methylPREDNISolone acetate injection 160 mg  -     cyanocobalamin injection 1,000 mcg  -     Sedimentation rate; Future  -     C-Reactive Protein; Future  -     Comprehensive Metabolic Panel; Future  -     CBC Auto Differential; Future  -     Anti-Thyroglobulin Antibody; Future  -     T4, Free; Future  -     Thyroid Peroxidase Antibody; Future  -     TSH; Future  -     T3, Free; Future  -     Vitamin D; Future    Type 2 diabetes mellitus with other specified complication, unspecified whether long term insulin use  -     Discontinue: semaglutide (OZEMPIC) 1 mg/dose (4 mg/3 mL); Inject 1 mg into the skin every 7 days.  -     oxyCODONE-acetaminophen (PERCOCET)  mg per tablet; Take 1 tablet by mouth every 8 (eight) hours as needed for Pain.  -     oxyCODONE-acetaminophen (PERCOCET)  mg per tablet; Take 1 tablet by mouth every 8 (eight) hours as needed for Pain.  -     oxyCODONE-acetaminophen (PERCOCET)  mg per tablet; Take 1 tablet by mouth every 8 (eight) hours as needed for Pain.  -     ketorolac injection 60 mg  -     methylPREDNISolone acetate injection 160 mg  -     cyanocobalamin injection 1,000 mcg  -     semaglutide (OZEMPIC) 2 mg/dose (8 mg/3 mL) PnIj; Inject 2 mg into the skin every 7 days.  -     Sedimentation rate; Future  -      C-Reactive Protein; Future  -     Comprehensive Metabolic Panel; Future  -     CBC Auto Differential; Future  -     Anti-Thyroglobulin Antibody; Future  -     T4, Free; Future  -     Thyroid Peroxidase Antibody; Future  -     TSH; Future  -     T3, Free; Future  -     Vitamin D; Future    Seasonal allergies  -     montelukast (SINGULAIR) 10 mg tablet; Take 1 tablet (10 mg total) by mouth every evening.  -     oxyCODONE-acetaminophen (PERCOCET)  mg per tablet; Take 1 tablet by mouth every 8 (eight) hours as needed for Pain.  -     oxyCODONE-acetaminophen (PERCOCET)  mg per tablet; Take 1 tablet by mouth every 8 (eight) hours as needed for Pain.  -     oxyCODONE-acetaminophen (PERCOCET)  mg per tablet; Take 1 tablet by mouth every 8 (eight) hours as needed for Pain.  -     ketorolac injection 60 mg  -     methylPREDNISolone acetate injection 160 mg  -     cyanocobalamin injection 1,000 mcg  -     Sedimentation rate; Future  -     C-Reactive Protein; Future  -     Comprehensive Metabolic Panel; Future  -     CBC Auto Differential; Future  -     Anti-Thyroglobulin Antibody; Future  -     T4, Free; Future  -     Thyroid Peroxidase Antibody; Future  -     TSH; Future  -     T3, Free; Future  -     Vitamin D; Future    Chronic pain syndrome  -     sulfaSALAzine (AZULFIDINE ENTABS) 500 MG EC tablet; Take 2 tablets (1,000 mg total) by mouth 2 (two) times daily.  -     Discontinue: semaglutide (OZEMPIC) 1 mg/dose (4 mg/3 mL); Inject 1 mg into the skin every 7 days.  -     predniSONE (DELTASONE) 2.5 MG tablet; Take 1 tablet (2.5 mg total) by mouth once daily.  -     montelukast (SINGULAIR) 10 mg tablet; Take 1 tablet (10 mg total) by mouth every evening.  -     gabapentin (NEURONTIN) 600 MG tablet; Take 1 tablet (600 mg total) by mouth 3 (three) times daily.  -     ergocalciferol (ERGOCALCIFEROL) 50,000 unit Cap; Take 1 capsule (50,000 Units total) by mouth every 7 days.  -     cyclobenzaprine (FLEXERIL) 10  MG tablet; Take 1 tablet (10 mg total) by mouth 3 (three) times daily as needed for Muscle spasms.  -     oxyCODONE-acetaminophen (PERCOCET)  mg per tablet; Take 1 tablet by mouth every 8 (eight) hours as needed for Pain.  -     oxyCODONE-acetaminophen (PERCOCET)  mg per tablet; Take 1 tablet by mouth every 8 (eight) hours as needed for Pain.  -     oxyCODONE-acetaminophen (PERCOCET)  mg per tablet; Take 1 tablet by mouth every 8 (eight) hours as needed for Pain.  -     ketorolac injection 60 mg  -     methylPREDNISolone acetate injection 160 mg  -     cyanocobalamin injection 1,000 mcg  -     Sedimentation rate; Future  -     C-Reactive Protein; Future  -     Comprehensive Metabolic Panel; Future  -     CBC Auto Differential; Future  -     Anti-Thyroglobulin Antibody; Future  -     T4, Free; Future  -     Thyroid Peroxidase Antibody; Future  -     TSH; Future  -     T3, Free; Future  -     Vitamin D; Future    Neck pain, acute  -     gabapentin (NEURONTIN) 600 MG tablet; Take 1 tablet (600 mg total) by mouth 3 (three) times daily.  -     oxyCODONE-acetaminophen (PERCOCET)  mg per tablet; Take 1 tablet by mouth every 8 (eight) hours as needed for Pain.  -     oxyCODONE-acetaminophen (PERCOCET)  mg per tablet; Take 1 tablet by mouth every 8 (eight) hours as needed for Pain.  -     oxyCODONE-acetaminophen (PERCOCET)  mg per tablet; Take 1 tablet by mouth every 8 (eight) hours as needed for Pain.  -     ketorolac injection 60 mg  -     methylPREDNISolone acetate injection 160 mg  -     cyanocobalamin injection 1,000 mcg    Vitamin D deficiency  -     sulfaSALAzine (AZULFIDINE ENTABS) 500 MG EC tablet; Take 2 tablets (1,000 mg total) by mouth 2 (two) times daily.  -     Discontinue: semaglutide (OZEMPIC) 1 mg/dose (4 mg/3 mL); Inject 1 mg into the skin every 7 days.  -     predniSONE (DELTASONE) 2.5 MG tablet; Take 1 tablet (2.5 mg total) by mouth once daily.  -     montelukast  (SINGULAIR) 10 mg tablet; Take 1 tablet (10 mg total) by mouth every evening.  -     gabapentin (NEURONTIN) 600 MG tablet; Take 1 tablet (600 mg total) by mouth 3 (three) times daily.  -     ergocalciferol (ERGOCALCIFEROL) 50,000 unit Cap; Take 1 capsule (50,000 Units total) by mouth every 7 days.  -     cyclobenzaprine (FLEXERIL) 10 MG tablet; Take 1 tablet (10 mg total) by mouth 3 (three) times daily as needed for Muscle spasms.  -     oxyCODONE-acetaminophen (PERCOCET)  mg per tablet; Take 1 tablet by mouth every 8 (eight) hours as needed for Pain.  -     oxyCODONE-acetaminophen (PERCOCET)  mg per tablet; Take 1 tablet by mouth every 8 (eight) hours as needed for Pain.  -     oxyCODONE-acetaminophen (PERCOCET)  mg per tablet; Take 1 tablet by mouth every 8 (eight) hours as needed for Pain.  -     ketorolac injection 60 mg  -     methylPREDNISolone acetate injection 160 mg  -     cyanocobalamin injection 1,000 mcg  -     Sedimentation rate; Future  -     C-Reactive Protein; Future  -     Comprehensive Metabolic Panel; Future  -     CBC Auto Differential; Future  -     Anti-Thyroglobulin Antibody; Future  -     T4, Free; Future  -     Thyroid Peroxidase Antibody; Future  -     TSH; Future  -     T3, Free; Future  -     Vitamin D; Future    Cervical spondylosis  -     sulfaSALAzine (AZULFIDINE ENTABS) 500 MG EC tablet; Take 2 tablets (1,000 mg total) by mouth 2 (two) times daily.  -     Discontinue: semaglutide (OZEMPIC) 1 mg/dose (4 mg/3 mL); Inject 1 mg into the skin every 7 days.  -     predniSONE (DELTASONE) 2.5 MG tablet; Take 1 tablet (2.5 mg total) by mouth once daily.  -     montelukast (SINGULAIR) 10 mg tablet; Take 1 tablet (10 mg total) by mouth every evening.  -     gabapentin (NEURONTIN) 600 MG tablet; Take 1 tablet (600 mg total) by mouth 3 (three) times daily.  -     ergocalciferol (ERGOCALCIFEROL) 50,000 unit Cap; Take 1 capsule (50,000 Units total) by mouth every 7 days.  -      cyclobenzaprine (FLEXERIL) 10 MG tablet; Take 1 tablet (10 mg total) by mouth 3 (three) times daily as needed for Muscle spasms.  -     oxyCODONE-acetaminophen (PERCOCET)  mg per tablet; Take 1 tablet by mouth every 8 (eight) hours as needed for Pain.  -     oxyCODONE-acetaminophen (PERCOCET)  mg per tablet; Take 1 tablet by mouth every 8 (eight) hours as needed for Pain.  -     oxyCODONE-acetaminophen (PERCOCET)  mg per tablet; Take 1 tablet by mouth every 8 (eight) hours as needed for Pain.  -     Sedimentation rate; Future  -     C-Reactive Protein; Future  -     Comprehensive Metabolic Panel; Future  -     CBC Auto Differential; Future  -     Anti-Thyroglobulin Antibody; Future  -     T4, Free; Future  -     Thyroid Peroxidase Antibody; Future  -     TSH; Future  -     T3, Free; Future  -     Vitamin D; Future    Insomnia, unspecified type  -     Discontinue: amitriptyline (ELAVIL) 10 MG tablet; Take 1 tablet (10 mg total) by mouth nightly as needed for Insomnia.  -     mirtazapine (REMERON) 7.5 MG Tab; Take 1 tablet (7.5 mg total) by mouth every evening.  -     Sedimentation rate; Future  -     C-Reactive Protein; Future  -     Comprehensive Metabolic Panel; Future  -     CBC Auto Differential; Future  -     Anti-Thyroglobulin Antibody; Future  -     T4, Free; Future  -     Thyroid Peroxidase Antibody; Future  -     TSH; Future  -     T3, Free; Future  -     Vitamin D; Future        1. Perocet refills  2. Labs ordered  3. F/u 4 months     Follow-up 4 months    More than 50% of the  40 minute encounter was spent face to face counseling the patient regarding current status and future plan of care as well as side effects  of the medications. All questions were answered to patient's satisfaction also includes  non-face to face time preparing to see the patient (eg, review of tests), Obtaining and/or reviewing separately obtained history, Documenting clinical information in the electronic or other  health record, Independently interpreting results

## 2025-01-31 ENCOUNTER — OFFICE VISIT (OUTPATIENT)
Dept: CARDIOLOGY | Facility: CLINIC | Age: 60
End: 2025-01-31
Payer: COMMERCIAL

## 2025-01-31 VITALS
BODY MASS INDEX: 32.61 KG/M2 | SYSTOLIC BLOOD PRESSURE: 126 MMHG | HEIGHT: 64 IN | WEIGHT: 191 LBS | OXYGEN SATURATION: 97 % | RESPIRATION RATE: 17 BRPM | DIASTOLIC BLOOD PRESSURE: 80 MMHG | HEART RATE: 93 BPM

## 2025-01-31 DIAGNOSIS — I10 ESSENTIAL HYPERTENSION: ICD-10-CM

## 2025-01-31 DIAGNOSIS — E78.5 DYSLIPIDEMIA: ICD-10-CM

## 2025-01-31 DIAGNOSIS — R60.0 PERIPHERAL EDEMA: Primary | ICD-10-CM

## 2025-01-31 PROCEDURE — 3074F SYST BP LT 130 MM HG: CPT | Mod: CPTII,S$GLB,, | Performed by: NURSE PRACTITIONER

## 2025-01-31 PROCEDURE — 99999 PR PBB SHADOW E&M-EST. PATIENT-LVL V: CPT | Mod: PBBFAC,,, | Performed by: NURSE PRACTITIONER

## 2025-01-31 PROCEDURE — 3008F BODY MASS INDEX DOCD: CPT | Mod: CPTII,S$GLB,, | Performed by: NURSE PRACTITIONER

## 2025-01-31 PROCEDURE — 99214 OFFICE O/P EST MOD 30 MIN: CPT | Mod: S$GLB,,, | Performed by: NURSE PRACTITIONER

## 2025-01-31 PROCEDURE — 1159F MED LIST DOCD IN RCRD: CPT | Mod: CPTII,S$GLB,, | Performed by: NURSE PRACTITIONER

## 2025-01-31 PROCEDURE — 3079F DIAST BP 80-89 MM HG: CPT | Mod: CPTII,S$GLB,, | Performed by: NURSE PRACTITIONER

## 2025-01-31 PROCEDURE — 3044F HG A1C LEVEL LT 7.0%: CPT | Mod: CPTII,S$GLB,, | Performed by: NURSE PRACTITIONER

## 2025-01-31 PROCEDURE — 4010F ACE/ARB THERAPY RXD/TAKEN: CPT | Mod: CPTII,S$GLB,, | Performed by: NURSE PRACTITIONER

## 2025-01-31 RX ORDER — ROSUVASTATIN CALCIUM 10 MG/1
10 TABLET, COATED ORAL NIGHTLY
Qty: 90 TABLET | Refills: 3 | Status: CANCELLED | OUTPATIENT
Start: 2025-01-31

## 2025-01-31 NOTE — ASSESSMENT & PLAN NOTE
BP stable in clinic and at recent outpatient appt.   Reports her home Bps have been elevated with SBP in the 150s.  Recommend patient bring her home BP cuff into the clinic to check it against ours.  Continue HCTZ 25 mg and losartan 50 mg daily.  Continue low sodium diet

## 2025-01-31 NOTE — PROGRESS NOTES
Subjective:    Patient ID:  Susana Andersen is a 59 y.o. female who presents for follow-up.  Chief Complaint   Patient presents with    Follow-up     Doing well  Pt states that she would like to discuss her BP meds    Medication Refill       HPI:  Susana Andersen is here for follow-up visit. Denies chest pain or shortness of breath. Denies recent fever cough chills or congestion. Denies blood in the urine or blood in the stool.  Denies myalgias. Denies orthopnea or peripheral edema. Denies nausea vomiting or dyspepsia. No recent arm neck or jaw pain. No lightheadedness or dizziness.     Her home BP remains elevated. Recent outpatient appointments have shown controlled BP.      Review of patient's allergies indicates:  No Known Allergies    Past Medical History:   Diagnosis Date    Allergy     Arthritis     Colon polyps 07/29/2021    Hypertension      Past Surgical History:   Procedure Laterality Date    COLONOSCOPY N/A 7/29/2021    Procedure: COLONOSCOPY;  Surgeon: Edgar Bolaños MD;  Location: [facility];  Service: Endoscopy;  Laterality: N/A;    COLONOSCOPY W/ BIOPSIES       Social History     Tobacco Use    Smoking status: Never    Smokeless tobacco: Never   Substance Use Topics    Alcohol use: Yes     Alcohol/week: 1.0 standard drink of alcohol     Types: 1 Glasses of wine per week    Drug use: Never     Family History   Problem Relation Name Age of Onset    Rheum arthritis Mother      Diabetes Mellitus Mother      Stroke Mother      Diabetes Mellitus Father      Stroke Father      Hypertension Sister      Rheum arthritis Sister      Hypertension Brother      Hypertension Son          Review of Systems:   See HPI         Objective:        Vitals:    01/31/25 1357   BP: 126/80   Pulse: 93   Resp: 17       Lab Results   Component Value Date    WBC 5.84 01/24/2025    HGB 13.3 01/24/2025    HCT 42.1 01/24/2025     01/24/2025    CHOL 116 (L) 10/07/2023    TRIG 164 (H) 10/07/2023    HDL 36 (L) 10/07/2023    ALT  14 01/24/2025    AST 13 01/24/2025     01/24/2025    K 4.1 01/24/2025     01/24/2025    CREATININE 0.9 01/24/2025    BUN 14 01/24/2025    CO2 32 (H) 01/24/2025    TSH 0.729 10/18/2024    HGBA1C 6.1 01/24/2025        ECHOCARDIOGRAM RESULTS  Results for orders placed during the hospital encounter of 07/24/23    Echo    Interpretation Summary  · Left ventricle ejection fraction 65% all walls move normally  · Diastolic function is normal  · Mean left atrial pressure is normal no pulmonary hypertension  · Valve structures are normal with no stenosis or regurgitation  · No abnormal intracavitary echo        CURRENT/PREVIOUS VISIT EKG  Results for orders placed or performed in visit on 08/15/24   IN OFFICE EKG 12-LEAD (to [employer])    Collection Time: 08/15/24  1:43 PM   Result Value Ref Range    QRS Duration 74 ms    [facility] QTC Calculation 452 ms    Narrative    Test Reason : I10,E78.5,    Vent. Rate : 080 BPM     Atrial Rate : 080 BPM     P-R Int : 148 ms          QRS Dur : 074 ms      QT Int : 392 ms       P-R-T Axes : 036 -08 -03 degrees     QTc Int : 452 ms    Normal sinus rhythm  Minimal voltage criteria for LVH, may be normal variant  Borderline Abnormal ECG    Confirmed by Kamini Hudson MD ([id number]) on 9/22/2024 1:57:33 PM    Referred By:             Confirmed By:Kamini Hudson MD     No valid procedures specified.   Results for orders placed during the hospital encounter of 07/24/23    Nuclear Stress - Cardiology Interpreted    Interpretation Summary    Abnormal myocardial perfusion scan.    There are two significant perfusion abnormalities.    Perfusion Abnormality #1 - There is a moderate intensity, moderate sized, reversible perfusion abnormality that is consistent with ischemia in the basal to apical inferior wall(s).    Perfusion Abnormality #2 - There is a small to moderate sized, moderate to severe intensity, mostly fixed perfusion abnormality with some reversibilty in the mid to apical  anteroapical wall(s).    There are no other significant perfusion abnormalities.    There is a moderate to severe intensity perfusion abnormality in the  wall of the left ventricle, secondary to breast attenuation.    There is a  moderate to severe intensity fixed perfusion abnormality in the  wall of the left ventricle secondary to diaphragm attenuation.    The gated perfusion images showed an ejection fraction of 65% at rest. The gated perfusion images showed an ejection fraction of 71% post stress. Normal ejection fraction is greater than 53%.    There is normal wall motion at rest and post stress.    LV cavity size is normal at rest and normal at stress.    The ECG portion of the study is negative for ischemia.    The patient reported no chest pain during the stress test.    There were no arrhythmias during stress.    A PET stress exam is recommended if clinically indicated.      Physical Exam:  CONSTITUTIONAL: No fever, no chills  HEENT: Normocephalic, atraumatic, pupils reactive to light                 NECK:  No JVD, no carotid bruit  CVS: S1S2+, RRR, no murmurs   LUNGS: Clear  ABDOMEN: Soft, NT, BS+, no bruit  EXTREMITIES: No cyanosis, no edema. Pulses intact  : No costa catheter  NEURO: AAO X 3  PSY: Normal affect      Medication List with Changes/Refills   Current Medications    ASPIRIN (ECOTRIN) 81 MG EC TABLET    Take 81 mg by mouth once daily.    ATORVASTATIN (LIPITOR) 20 MG TABLET    Take 20 mg by mouth once daily.    AZELASTINE (ASTELIN) 137 MCG (0.1 %) NASAL SPRAY    Use 1 spray (137 mcg total) by Nasal route 2 (two) times daily.    CETIRIZINE (ZYRTEC) 10 MG TABLET    Take 1 tablet (10 mg total) by mouth once daily.    CYCLOBENZAPRINE (FLEXERIL) 10 MG TABLET    Take 1 tablet (10 mg total) by mouth 3 (three) times daily as needed for Muscle spasms.    DICLOFENAC SODIUM (VOLTAREN) 1 % GEL    Apply topically daily as needed.    ERGOCALCIFEROL (ERGOCALCIFEROL) 50,000 UNIT CAP    Take 1 capsule (50,000  Units total) by mouth every 7 days.    FLUCONAZOLE (DIFLUCAN) 150 MG TAB    Take 150 mg by mouth once daily.    FOSFOMYCIN (MONUROL) 3 GRAM PACK    Mix 1 packet (3 g) by mouth once. for 1 dose    GABAPENTIN (NEURONTIN) 600 MG TABLET    Take 1 tablet (600 mg total) by mouth 3 (three) times daily.    HYDROCHLOROTHIAZIDE (HYDRODIURIL) 25 MG TABLET    Take 1 tablet (25 mg total) by mouth once daily.    LEVOCETIRIZINE (XYZAL) 5 MG TABLET    Take 5 mg by mouth every evening.    LOSARTAN (COZAAR) 50 MG TABLET    Take 1 tablet (50 mg total) by mouth once daily.    METOPROLOL TARTRATE (LOPRESSOR) 25 MG TABLET    Take 25 mg by mouth 2 (two) times daily as needed (palpitations).    MIRTAZAPINE (REMERON) 7.5 MG TAB    Take 1 tablet (7.5 mg total) by mouth every evening.    MONTELUKAST (SINGULAIR) 10 MG TABLET    Take 1 tablet (10 mg total) by mouth every evening.    OXYCODONE-ACETAMINOPHEN (PERCOCET)  MG PER TABLET    Take 1 tablet by mouth every 8 (eight) hours as needed for Pain.    OXYCODONE-ACETAMINOPHEN (PERCOCET)  MG PER TABLET    Take 1 tablet by mouth every 8 (eight) hours as needed for Pain.    OXYCODONE-ACETAMINOPHEN (PERCOCET)  MG PER TABLET    Take 1 tablet by mouth every 8 (eight) hours as needed for Pain.    PREDNISONE (DELTASONE) 2.5 MG TABLET    Take 1 tablet (2.5 mg total) by mouth once daily.    SEMAGLUTIDE (OZEMPIC) 2 MG/DOSE (8 MG/3 ML) PNIJ    Inject 2 mg into the skin every 7 days.    SULFASALAZINE (AZULFIDINE ENTABS) 500 MG EC TABLET    Take 2 tablets (1,000 mg total) by mouth 2 (two) times daily.   Discontinued Medications    AMLODIPINE (NORVASC) 5 MG TABLET    Take 5 mg by mouth 2 (two) times daily.    ROSUVASTATIN (CRESTOR) 10 MG TABLET    Take 1 tablet (10 mg total) by mouth every evening.             Assessment:       1. Peripheral edema    2. Essential hypertension    3. Dyslipidemia         Plan:     Problem List Items Addressed This Visit          Cardiac/Vascular    Essential  hypertension    Current Assessment & Plan     BP stable in clinic and at recent outpatient appt.   Reports her home Bps have been elevated with SBP in the 150s.  Recommend patient bring her home BP cuff into the clinic to check it against ours.  Continue HCTZ 25 mg and losartan 50 mg daily.  Continue low sodium diet         Dyslipidemia    Current Assessment & Plan     Continue atorvastatin 20 mg  Low fat diet and exercise encouraged            Other    Peripheral edema - Primary    Current Assessment & Plan     No edema on exam            Follow up in about 6 months (around 7/31/2025).

## 2025-04-29 DIAGNOSIS — G89.4 CHRONIC PAIN SYNDROME: ICD-10-CM

## 2025-04-29 DIAGNOSIS — M06.00 SERONEGATIVE RHEUMATOID ARTHRITIS: ICD-10-CM

## 2025-04-29 DIAGNOSIS — E11.69 TYPE 2 DIABETES MELLITUS WITH OTHER SPECIFIED COMPLICATION, UNSPECIFIED WHETHER LONG TERM INSULIN USE: ICD-10-CM

## 2025-04-29 DIAGNOSIS — J30.2 SEASONAL ALLERGIES: ICD-10-CM

## 2025-04-29 DIAGNOSIS — M54.2 NECK PAIN, ACUTE: ICD-10-CM

## 2025-04-29 DIAGNOSIS — M47.812 CERVICAL SPONDYLOSIS: ICD-10-CM

## 2025-04-29 DIAGNOSIS — E55.9 VITAMIN D DEFICIENCY: ICD-10-CM

## 2025-05-04 ENCOUNTER — PATIENT MESSAGE (OUTPATIENT)
Dept: RHEUMATOLOGY | Facility: CLINIC | Age: 60
End: 2025-05-04
Payer: COMMERCIAL

## 2025-05-04 DIAGNOSIS — M47.812 CERVICAL SPONDYLOSIS: ICD-10-CM

## 2025-05-04 DIAGNOSIS — J30.2 SEASONAL ALLERGIES: ICD-10-CM

## 2025-05-04 DIAGNOSIS — G89.4 CHRONIC PAIN SYNDROME: Primary | ICD-10-CM

## 2025-05-04 DIAGNOSIS — M06.00 SERONEGATIVE RHEUMATOID ARTHRITIS: ICD-10-CM

## 2025-05-05 RX ORDER — OXYCODONE AND ACETAMINOPHEN 10; 325 MG/1; MG/1
1 TABLET ORAL EVERY 8 HOURS PRN
Qty: 90 TABLET | Refills: 0 | Status: SHIPPED | OUTPATIENT
Start: 2025-07-03 | End: 2025-08-02

## 2025-05-05 RX ORDER — OXYCODONE AND ACETAMINOPHEN 10; 325 MG/1; MG/1
1 TABLET ORAL EVERY 8 HOURS PRN
Qty: 90 TABLET | Refills: 0 | OUTPATIENT
Start: 2025-05-05 | End: 2025-06-04

## 2025-05-05 RX ORDER — OXYCODONE AND ACETAMINOPHEN 10; 325 MG/1; MG/1
1 TABLET ORAL EVERY 8 HOURS PRN
Qty: 90 TABLET | Refills: 0 | Status: SHIPPED | OUTPATIENT
Start: 2025-06-04 | End: 2025-07-04

## 2025-05-05 RX ORDER — CETIRIZINE HYDROCHLORIDE 10 MG/1
10 TABLET ORAL DAILY
Qty: 90 TABLET | Refills: 3 | Status: SHIPPED | OUTPATIENT
Start: 2025-05-05

## 2025-05-05 RX ORDER — OXYCODONE AND ACETAMINOPHEN 10; 325 MG/1; MG/1
1 TABLET ORAL EVERY 8 HOURS PRN
Qty: 90 TABLET | Refills: 0 | Status: SHIPPED | OUTPATIENT
Start: 2025-05-05 | End: 2025-06-04

## 2025-05-06 ENCOUNTER — PATIENT MESSAGE (OUTPATIENT)
Dept: RHEUMATOLOGY | Facility: CLINIC | Age: 60
End: 2025-05-06
Payer: COMMERCIAL

## 2025-05-18 ENCOUNTER — PATIENT MESSAGE (OUTPATIENT)
Dept: RHEUMATOLOGY | Facility: CLINIC | Age: 60
End: 2025-05-18
Payer: COMMERCIAL

## 2025-05-22 ENCOUNTER — CLINICAL SUPPORT (OUTPATIENT)
Dept: RHEUMATOLOGY | Facility: CLINIC | Age: 60
End: 2025-05-22
Payer: COMMERCIAL

## 2025-05-22 VITALS
HEIGHT: 64 IN | BODY MASS INDEX: 32.27 KG/M2 | HEART RATE: 82 BPM | SYSTOLIC BLOOD PRESSURE: 126 MMHG | DIASTOLIC BLOOD PRESSURE: 85 MMHG | WEIGHT: 189 LBS

## 2025-05-22 DIAGNOSIS — G89.4 CHRONIC PAIN SYNDROME: ICD-10-CM

## 2025-05-22 DIAGNOSIS — M06.00 SERONEGATIVE RHEUMATOID ARTHRITIS: Primary | ICD-10-CM

## 2025-05-22 PROCEDURE — 99999 PR PBB SHADOW E&M-EST. PATIENT-LVL IV: CPT | Mod: PBBFAC,,,

## 2025-05-22 RX ORDER — METHYLPREDNISOLONE ACETATE 80 MG/ML
160 INJECTION, SUSPENSION INTRA-ARTICULAR; INTRALESIONAL; INTRAMUSCULAR; SOFT TISSUE
Status: COMPLETED | OUTPATIENT
Start: 2025-05-22 | End: 2025-05-22

## 2025-05-22 RX ORDER — KETOROLAC TROMETHAMINE 30 MG/ML
60 INJECTION, SOLUTION INTRAMUSCULAR; INTRAVENOUS
Status: COMPLETED | OUTPATIENT
Start: 2025-05-22 | End: 2025-05-22

## 2025-05-22 RX ORDER — CYANOCOBALAMIN 1000 UG/ML
1000 INJECTION, SOLUTION INTRAMUSCULAR; SUBCUTANEOUS
Status: COMPLETED | OUTPATIENT
Start: 2025-05-22 | End: 2025-05-22

## 2025-05-22 RX ADMIN — METHYLPREDNISOLONE ACETATE 160 MG: 80 INJECTION, SUSPENSION INTRA-ARTICULAR; INTRALESIONAL; INTRAMUSCULAR; SOFT TISSUE at 03:05

## 2025-05-22 RX ADMIN — CYANOCOBALAMIN 1000 MCG: 1000 INJECTION, SOLUTION INTRAMUSCULAR; SUBCUTANEOUS at 03:05

## 2025-05-22 RX ADMIN — KETOROLAC TROMETHAMINE 60 MG: 30 INJECTION, SOLUTION INTRAMUSCULAR; INTRAVENOUS at 03:05

## 2025-05-22 NOTE — PROGRESS NOTES
2-patient identifiers confirmed, allergies reviewed, injection education discussed with patient.  Injections tolerated well with no C/O of pain or complications, see MAR for injection information.  Patient exited room ambulatory in stable condition.  VIRGILIO Perez LPN

## 2025-05-26 ENCOUNTER — PATIENT MESSAGE (OUTPATIENT)
Dept: RHEUMATOLOGY | Facility: CLINIC | Age: 60
End: 2025-05-26
Payer: COMMERCIAL

## 2025-06-02 ENCOUNTER — PATIENT MESSAGE (OUTPATIENT)
Dept: RHEUMATOLOGY | Facility: CLINIC | Age: 60
End: 2025-06-02
Payer: COMMERCIAL

## 2025-06-02 DIAGNOSIS — E11.69 TYPE 2 DIABETES MELLITUS WITH OTHER SPECIFIED COMPLICATION, UNSPECIFIED WHETHER LONG TERM INSULIN USE: ICD-10-CM

## 2025-06-04 DIAGNOSIS — M06.00 SERONEGATIVE RHEUMATOID ARTHRITIS: ICD-10-CM

## 2025-06-04 DIAGNOSIS — J30.2 SEASONAL ALLERGIES: ICD-10-CM

## 2025-06-04 DIAGNOSIS — M47.812 CERVICAL SPONDYLOSIS: ICD-10-CM

## 2025-06-04 DIAGNOSIS — G89.4 CHRONIC PAIN SYNDROME: ICD-10-CM

## 2025-06-08 RX ORDER — OXYCODONE AND ACETAMINOPHEN 10; 325 MG/1; MG/1
1 TABLET ORAL EVERY 8 HOURS PRN
Qty: 90 TABLET | Refills: 0 | Status: SHIPPED | OUTPATIENT
Start: 2025-07-05 | End: 2025-08-04

## 2025-06-08 RX ORDER — OXYCODONE AND ACETAMINOPHEN 10; 325 MG/1; MG/1
1 TABLET ORAL EVERY 8 HOURS PRN
Qty: 90 TABLET | Refills: 0 | Status: SHIPPED | OUTPATIENT
Start: 2025-08-05 | End: 2025-09-04

## 2025-06-08 RX ORDER — OXYCODONE AND ACETAMINOPHEN 10; 325 MG/1; MG/1
1 TABLET ORAL EVERY 8 HOURS PRN
Qty: 90 TABLET | Refills: 0 | Status: SHIPPED | OUTPATIENT
Start: 2025-06-08 | End: 2025-07-08

## 2025-06-08 RX ORDER — SEMAGLUTIDE 2.68 MG/ML
2 INJECTION, SOLUTION SUBCUTANEOUS
Qty: 3 ML | Refills: 11 | Status: SHIPPED | OUTPATIENT
Start: 2025-06-08 | End: 2026-06-08

## 2025-06-10 ENCOUNTER — OFFICE VISIT (OUTPATIENT)
Dept: ORTHOPEDICS | Facility: CLINIC | Age: 60
End: 2025-06-10
Payer: COMMERCIAL

## 2025-06-10 ENCOUNTER — HOSPITAL ENCOUNTER (OUTPATIENT)
Dept: RADIOLOGY | Facility: HOSPITAL | Age: 60
Discharge: HOME | End: 2025-06-10
Attending: NURSE PRACTITIONER
Payer: COMMERCIAL

## 2025-06-10 VITALS — HEIGHT: 64 IN | BODY MASS INDEX: 32.26 KG/M2 | WEIGHT: 188.94 LBS

## 2025-06-10 DIAGNOSIS — M17.12 PRIMARY OSTEOARTHRITIS OF LEFT KNEE: Primary | ICD-10-CM

## 2025-06-10 DIAGNOSIS — M25.562 LEFT KNEE PAIN, UNSPECIFIED CHRONICITY: Primary | ICD-10-CM

## 2025-06-10 DIAGNOSIS — M25.562 LEFT KNEE PAIN, UNSPECIFIED CHRONICITY: ICD-10-CM

## 2025-06-10 PROCEDURE — 99204 OFFICE O/P NEW MOD 45 MIN: CPT | Mod: 25,S$GLB,, | Performed by: NURSE PRACTITIONER

## 2025-06-10 PROCEDURE — 73560 X-RAY EXAM OF KNEE 1 OR 2: CPT | Mod: 26,59,RT, | Performed by: RADIOLOGY

## 2025-06-10 PROCEDURE — 3044F HG A1C LEVEL LT 7.0%: CPT | Mod: CPTII,S$GLB,, | Performed by: NURSE PRACTITIONER

## 2025-06-10 PROCEDURE — 20610 DRAIN/INJ JOINT/BURSA W/O US: CPT | Mod: LT,S$GLB,, | Performed by: NURSE PRACTITIONER

## 2025-06-10 PROCEDURE — 73560 X-RAY EXAM OF KNEE 1 OR 2: CPT | Mod: TC,PO,RT

## 2025-06-10 PROCEDURE — 73562 X-RAY EXAM OF KNEE 3: CPT | Mod: 26,LT,, | Performed by: RADIOLOGY

## 2025-06-10 PROCEDURE — 1159F MED LIST DOCD IN RCRD: CPT | Mod: CPTII,S$GLB,, | Performed by: NURSE PRACTITIONER

## 2025-06-10 PROCEDURE — 3008F BODY MASS INDEX DOCD: CPT | Mod: CPTII,S$GLB,, | Performed by: NURSE PRACTITIONER

## 2025-06-10 PROCEDURE — 99999 PR PBB SHADOW E&M-EST. PATIENT-LVL IV: CPT | Mod: PBBFAC,,, | Performed by: NURSE PRACTITIONER

## 2025-06-10 PROCEDURE — 4010F ACE/ARB THERAPY RXD/TAKEN: CPT | Mod: CPTII,S$GLB,, | Performed by: NURSE PRACTITIONER

## 2025-06-10 PROCEDURE — 1160F RVW MEDS BY RX/DR IN RCRD: CPT | Mod: CPTII,S$GLB,, | Performed by: NURSE PRACTITIONER

## 2025-06-10 RX ORDER — TRIAMCINOLONE ACETONIDE 40 MG/ML
40 INJECTION, SUSPENSION INTRA-ARTICULAR; INTRAMUSCULAR
Status: DISCONTINUED | OUTPATIENT
Start: 2025-06-10 | End: 2025-06-10 | Stop reason: HOSPADM

## 2025-06-10 RX ADMIN — TRIAMCINOLONE ACETONIDE 40 MG: 40 INJECTION, SUSPENSION INTRA-ARTICULAR; INTRAMUSCULAR at 01:06

## 2025-06-10 NOTE — PROCEDURES
Large Joint Aspiration/Injection: L knee    Date/Time: 6/10/2025 1:40 PM    Performed by: Betzaida Ponce FNP  Authorized by: Betzaida Ponce FNP    Consent Done?:  Yes (Verbal)  Indications:  Pain  Timeout: prior to procedure the correct patient, procedure, and site was verified    Prep: patient was prepped and draped in usual sterile fashion    Local anesthetic:  Lidocaine 1% without epinephrine  Anesthetic total (ml):  5      Details:  Needle Size:  21 G  Approach:  Anterolateral  Location:  Knee  Site:  L knee  Medications:  40 mg triamcinolone acetonide 40 mg/mL  Patient tolerance:  Patient tolerated the procedure well with no immediate complications

## 2025-06-10 NOTE — PROGRESS NOTES
Chief Complaint   Patient presents with    Left Knee - Pain     History of Present Illness    Susana presents for evaluation of left knee pain, reporting a long-standing history of knee issues. She expresses discomfort and indicates a desire for treatment. It has been a considerable time since she last received treatment for her knee, specifically referencing a previous cortisone injection from an orthopedic specialist.      ROS:  Musculoskeletal: +joint pain, +limb pain            Past Medical History:   Diagnosis Date    Allergy     Arthritis     Colon polyps 07/29/2021    Hypertension       Past Surgical History:   Procedure Laterality Date    COLONOSCOPY N/A 7/29/2021    Procedure: COLONOSCOPY;  Surgeon: Edgar Bolaños MD;  Location: [facility] [place], [place];  Service: Endoscopy;  Laterality: N/A;    COLONOSCOPY W/ BIOPSIES        Medications Ordered Prior to Encounter[1]   Review of patient's allergies indicates:  No Known Allergies   Family History not pertinent   Social History[2]      Review of Systems:   Constitutional:  Denies fever or chills    Eyes:  Denies change in visual acuity    HENT:  Denies nasal congestion or sore throat    Respiratory:  Denies cough or shortness of breath    Cardiovascular:  Denies chest pain or edema    GI:  Denies abdominal pain, nausea, vomiting, bloody stools or diarrhea    :  Denies dysuria    Integument:  Denies rash    Neurologic:  Denies headache, focal weakness or sensory changes    Endocrine:  Denies polyuria or polydipsia    Lymphatic:  Denies swollen glands    Psychiatric:  Denies depression or anxiety       Physical Exam:    Constitutional:  Well developed, well nourished, no acute distress, non-toxic appearance    Integument:  Well hydrated  Neurologic:  Alert & oriented x 3  Psychiatric:  Speech and behavior appropriate      Bilateral Knee Exam    left Knee Exam   Tenderness   The patient is experiencing tenderness in the medial joint line.    Range of Motion   Flexion:  abnormal     Muscle Strength   The patient has normal knee strength.    Tests   Radha:  Medial - positive   Lachman:  Anterior - negative      Varus: negative  Valgus: negative  Patellar Apprehension: negative      Other   Erythema: absent  Sensation: normal  Pulse: present  Swelling: mild    right Knee exam   Knee exam performed same as contralateral side and is normal        X-rays were performed today, personally reviewed by me and findings discussed with the patient.  3 views of the left knee show tricompartmental degenerative changes                Primary osteoarthritis of left knee  -     Large Joint Aspiration/Injection: L knee  -     triamcinolone acetonide injection 40 mg      Using an aseptic technique, I injected 5 cc of lidocaine 1% without and 1 cc of kenalog 40mg into the left Knee. The patient tolerated this well. I will have them return to clinic in 3 months or as needed.            [1]   Current Outpatient Medications on File Prior to Visit   Medication Sig Dispense Refill    aspirin (ECOTRIN) 81 MG EC tablet Take 81 mg by mouth once daily.      atorvastatin (LIPITOR) 20 MG tablet Take 20 mg by mouth once daily.      azelastine (ASTELIN) 137 mcg (0.1 %) nasal spray Use 1 spray (137 mcg total) by Nasal route 2 (two) times daily. 30 mL 3    cetirizine (ZYRTEC) 10 MG tablet Take 1 tablet (10 mg total) by mouth once daily. 90 tablet 3    cyclobenzaprine (FLEXERIL) 10 MG tablet Take 1 tablet (10 mg total) by mouth 3 (three) times daily as needed for Muscle spasms. 90 tablet 5    diclofenac sodium (VOLTAREN) 1 % Gel Apply topically daily as needed.      ergocalciferol (ERGOCALCIFEROL) 50,000 unit Cap Take 1 capsule (50,000 Units total) by mouth every 7 days. 12 capsule 1    fluconazole (DIFLUCAN) 150 MG Tab Take 150 mg by mouth once daily.      gabapentin (NEURONTIN) 600 MG tablet Take 1 tablet (600 mg total) by mouth 3 (three) times daily. 270 tablet 1    hydroCHLOROthiazide (HYDRODIURIL) 25 MG  tablet Take 1 tablet (25 mg total) by mouth once daily. 90 tablet 3    losartan (COZAAR) 50 MG tablet Take 1 tablet (50 mg total) by mouth once daily. 90 tablet 3    metoprolol tartrate (LOPRESSOR) 25 MG tablet Take 25 mg by mouth 2 (two) times daily as needed (palpitations).      mirtazapine (REMERON) 7.5 MG Tab Take 1 tablet (7.5 mg total) by mouth every evening. 90 tablet 3    montelukast (SINGULAIR) 10 mg tablet Take 1 tablet (10 mg total) by mouth every evening. 90 tablet 3    [START ON 7/3/2025] oxyCODONE-acetaminophen (PERCOCET)  mg per tablet Take 1 tablet by mouth every 8 (eight) hours as needed for Pain. 90 tablet 0    oxyCODONE-acetaminophen (PERCOCET)  mg per tablet Take 1 tablet by mouth every 8 (eight) hours as needed for Pain. 90 tablet 0    [START ON 7/5/2025] oxyCODONE-acetaminophen (PERCOCET)  mg per tablet Take 1 tablet by mouth every 8 (eight) hours as needed for Pain. 90 tablet 0    [START ON 8/5/2025] oxyCODONE-acetaminophen (PERCOCET)  mg per tablet Take 1 tablet by mouth every 8 (eight) hours as needed for Pain. 90 tablet 0    predniSONE (DELTASONE) 2.5 MG tablet Take 1 tablet (2.5 mg total) by mouth once daily. 90 tablet 2    semaglutide (OZEMPIC) 2 mg/dose (8 mg/3 mL) PnIj Inject 2 mg into the skin every 7 days. 3 mL 11    sulfaSALAzine (AZULFIDINE ENTABS) 500 MG EC tablet Take 2 tablets (1,000 mg total) by mouth 2 (two) times daily. 360 tablet 3     No current facility-administered medications on file prior to visit.   [2]   Social History  Socioeconomic History    Marital status:    Tobacco Use    Smoking status: Never    Smokeless tobacco: Never   Substance and Sexual Activity    Alcohol use: Yes     Alcohol/week: 1.0 standard drink of alcohol     Types: 1 Glasses of wine per week    Drug use: Never    Sexual activity: Yes     Partners: Male

## 2025-06-13 ENCOUNTER — TELEPHONE (OUTPATIENT)
Dept: RHEUMATOLOGY | Facility: CLINIC | Age: 60
End: 2025-06-13
Payer: COMMERCIAL

## 2025-06-13 NOTE — TELEPHONE ENCOUNTER
Copied from CRM #[id number]. Topic: Medications - Medication Authorization  >> Jun 13, 2025 10:40 AM Lily wrote:  Type:  Needs Medical Advice    Who Called: Mellissa w/[facility]  Would the patient rather a call back or a response via [facility]? Call back  Best Call Back Number:  Mellissa:[phone] [phone]  Additional Information: Mellissa is aw auth for semaglutide (OZEMPIC) 2 mg/dose (8 mg/3 mL) PnIj for pt. Sent over info 6/10 & 6/12. Pls call back

## 2025-06-22 ENCOUNTER — OFFICE VISIT (OUTPATIENT)
Dept: URGENT CARE | Facility: CLINIC | Age: 60
End: 2025-06-22
Payer: COMMERCIAL

## 2025-06-22 VITALS
DIASTOLIC BLOOD PRESSURE: 87 MMHG | OXYGEN SATURATION: 96 % | HEIGHT: 64 IN | HEART RATE: 92 BPM | RESPIRATION RATE: 18 BRPM | TEMPERATURE: 98 F | BODY MASS INDEX: 32.27 KG/M2 | WEIGHT: 189 LBS | SYSTOLIC BLOOD PRESSURE: 129 MMHG

## 2025-06-22 DIAGNOSIS — N30.00 ACUTE CYSTITIS WITHOUT HEMATURIA: Primary | ICD-10-CM

## 2025-06-22 DIAGNOSIS — R30.0 DYSURIA: ICD-10-CM

## 2025-06-22 DIAGNOSIS — B37.31 YEAST VAGINITIS: ICD-10-CM

## 2025-06-22 DIAGNOSIS — R35.0 URINARY FREQUENCY: ICD-10-CM

## 2025-06-22 LAB
BILIRUB UR QL STRIP: NEGATIVE
GLUCOSE UR QL STRIP: NEGATIVE
KETONES UR QL STRIP: NEGATIVE
LEUKOCYTE ESTERASE UR QL STRIP: NEGATIVE
PH, POC UA: 6
POC BLOOD, URINE: NEGATIVE
POC NITRATES, URINE: POSITIVE
PROT UR QL STRIP: NEGATIVE
SP GR UR STRIP: 1.01 (ref 1–1.03)
UROBILINOGEN UR STRIP-ACNC: ABNORMAL (ref 0.1–1.1)

## 2025-06-22 PROCEDURE — 99214 OFFICE O/P EST MOD 30 MIN: CPT | Mod: S$GLB,,,

## 2025-06-22 PROCEDURE — 81003 URINALYSIS AUTO W/O SCOPE: CPT | Mod: QW,S$GLB,,

## 2025-06-22 RX ORDER — NITROFURANTOIN 25; 75 MG/1; MG/1
100 CAPSULE ORAL 2 TIMES DAILY
Qty: 10 CAPSULE | Refills: 0 | Status: SHIPPED | OUTPATIENT
Start: 2025-06-22 | End: 2025-06-27

## 2025-06-22 RX ORDER — FLUCONAZOLE 150 MG/1
TABLET ORAL
Qty: 2 TABLET | Refills: 1 | Status: SHIPPED | OUTPATIENT
Start: 2025-06-22

## 2025-06-22 RX ORDER — PHENAZOPYRIDINE HYDROCHLORIDE 100 MG/1
100 TABLET, FILM COATED ORAL 3 TIMES DAILY PRN
Qty: 9 TABLET | Refills: 0 | Status: SHIPPED | OUTPATIENT
Start: 2025-06-22 | End: 2025-06-25

## 2025-06-22 NOTE — PATIENT INSTRUCTIONS

## 2025-06-22 NOTE — PROGRESS NOTES
"Subjective:      Patient ID: Susana Andersen is a 60 y.o. female.    Vitals:  height is 5' 4" (1.626 m) and weight is 85.7 kg (189 lb). Her oral temperature is 98.1 °F (36.7 °C). Her blood pressure is 129/87 and her pulse is 92. Her respiration is 18 and oxygen saturation is 96%.     Chief Complaint: Urinary Tract Infection    Patient presents to the clinic with complaint of UTI symptoms.     Patient Active Problem List:     Seronegative rheumatoid arthritis     History of colon polyps     Essential hypertension     Dyslipidemia     Nonspecific abnormal electrocardiogram (ECG) (EKG)     Abnormal nuclear stress test     Peripheral edema    Symptoms started around 1 month ago with urinary frequency, dysuria, and urgency.   Has increased fluid without relief.     Urinary Tract Infection   This is a new problem. The current episode started 1 to 4 weeks ago. The problem occurs every urination. The problem has been unchanged. The quality of the pain is described as aching. The pain is at a severity of 5/10. The pain is mild. There has been no fever. Associated symptoms include frequency and urgency. Pertinent negatives include no chills, nausea, vomiting or constipation. She has tried increased fluids for the symptoms. The treatment provided no relief.       Constitution: Negative for appetite change, chills, sweating, fatigue, fever and generalized weakness.   HENT:  Negative for ear pain, congestion, postnasal drip, sinus pain, sinus pressure, sore throat, trouble swallowing and voice change.    Neck: Negative for neck pain, neck stiffness, painful lymph nodes and neck swelling.   Cardiovascular:  Negative for chest pain, leg swelling and palpitations.   Respiratory:  Negative for chest tightness, cough, shortness of breath and wheezing.    Gastrointestinal:  Negative for abdominal pain, nausea, vomiting, constipation and diarrhea.   Genitourinary:  Positive for dysuria, frequency and urgency. Negative for urine " decreased.   Skin:  Negative for color change and pale.   Allergic/Immunologic: Negative for chronic cough.   Neurological:  Negative for dizziness, headaches, disorientation and altered mental status.   Hematologic/Lymphatic: Negative for swollen lymph nodes.   Psychiatric/Behavioral:  Negative for altered mental status, disorientation and confusion.       Objective:     Physical Exam   Constitutional: She is oriented to person, place, and time. She appears well-developed.   HENT:   Head: Normocephalic and atraumatic.   Ears:   Right Ear: External ear normal.   Left Ear: External ear normal.   Nose: Nose normal.   Mouth/Throat: Oropharynx is clear and moist.   Eyes: Conjunctivae, EOM and lids are normal. Pupils are equal, round, and reactive to light.   Neck: Trachea normal and phonation normal. Neck supple.   Abdominal: There is no left CVA tenderness and no right CVA tenderness.   Musculoskeletal: Normal range of motion.         General: Normal range of motion.   Neurological: She is alert and oriented to person, place, and time.   Skin: Skin is warm, dry and intact.   Psychiatric: Her speech is normal and behavior is normal. Judgment and thought content normal.   Nursing note and vitals reviewed.      Assessment:     1. Acute cystitis without hematuria    2. Dysuria    3. Urinary frequency    4. Yeast vaginitis        Plan:       Acute cystitis without hematuria  -     POCT Urinalysis, Dipstick, Automated, W/O Scope  -     nitrofurantoin, macrocrystal-monohydrate, (MACROBID) 100 MG capsule; Take 1 capsule (100 mg total) by mouth 2 (two) times daily. for 5 days  Dispense: 10 capsule; Refill: 0  -     phenazopyridine (PYRIDIUM) 100 MG tablet; Take 1 tablet (100 mg total) by mouth 3 (three) times daily as needed for Pain.  Dispense: 9 tablet; Refill: 0  -     Urine Culture High Risk ($$)    Dysuria    Urinary frequency    Yeast vaginitis  -     fluconazole (DIFLUCAN) 150 MG Tab; Take 1 tablet today and repeat in 3  days if symptoms persist  Dispense: 2 tablet; Refill: 1       - UA positive for nitrates   - Urine culture ordered   - Increase fluid intake  - Take medications as prescribed.  - Assure adequate hydration.  - Follow-up with PCP in 1-2 days.  - Return to clinic as needed.  - To ED for any new or acutely worsening symptoms including but not limited to chest pain, palpitations, shortness of breath, or fever greater than 103° F.  Patient in agreement with plan of care.     - The diagnosis, treatment plan, instructions for follow-up and reevaluation as well as ED precautions were discussed and understanding was verbalized. All questions or concerns have been addressed.

## 2025-06-26 ENCOUNTER — RESULTS FOLLOW-UP (OUTPATIENT)
Dept: URGENT CARE | Facility: CLINIC | Age: 60
End: 2025-06-26

## 2025-06-26 LAB
BACTERIA UR CULT: ABNORMAL
BACTERIA UR CULT: ABNORMAL
OTHER ANTIBIOTIC SUSC ISLT: ABNORMAL

## 2025-06-30 ENCOUNTER — PATIENT MESSAGE (OUTPATIENT)
Dept: RHEUMATOLOGY | Facility: CLINIC | Age: 60
End: 2025-06-30
Payer: COMMERCIAL

## 2025-07-15 DIAGNOSIS — M06.00 SERONEGATIVE RHEUMATOID ARTHRITIS: ICD-10-CM

## 2025-07-15 DIAGNOSIS — E55.9 VITAMIN D DEFICIENCY: ICD-10-CM

## 2025-07-15 DIAGNOSIS — G89.4 CHRONIC PAIN SYNDROME: ICD-10-CM

## 2025-07-15 DIAGNOSIS — M47.812 CERVICAL SPONDYLOSIS: ICD-10-CM

## 2025-07-15 RX ORDER — ERGOCALCIFEROL 1.25 MG/1
50000 CAPSULE ORAL
Qty: 12 CAPSULE | Refills: 1 | Status: SHIPPED | OUTPATIENT
Start: 2025-07-15

## 2025-07-15 RX ORDER — ERGOCALCIFEROL 1.25 MG/1
50000 CAPSULE ORAL
Qty: 12 CAPSULE | Refills: 1 | Status: CANCELLED | OUTPATIENT
Start: 2025-07-15

## 2025-07-28 DIAGNOSIS — J30.2 SEASONAL ALLERGIES: ICD-10-CM

## 2025-07-28 DIAGNOSIS — M06.00 SERONEGATIVE RHEUMATOID ARTHRITIS: ICD-10-CM

## 2025-07-28 DIAGNOSIS — G89.4 CHRONIC PAIN SYNDROME: ICD-10-CM

## 2025-07-28 DIAGNOSIS — M47.812 CERVICAL SPONDYLOSIS: ICD-10-CM

## 2025-07-30 RX ORDER — OXYCODONE AND ACETAMINOPHEN 10; 325 MG/1; MG/1
1 TABLET ORAL EVERY 8 HOURS PRN
Qty: 90 TABLET | Refills: 0 | OUTPATIENT
Start: 2025-08-01 | End: 2025-08-31

## 2025-08-24 ENCOUNTER — OFFICE VISIT (OUTPATIENT)
Dept: URGENT CARE | Facility: CLINIC | Age: 60
End: 2025-08-24
Payer: COMMERCIAL

## 2025-08-24 VITALS
RESPIRATION RATE: 16 BRPM | OXYGEN SATURATION: 99 % | HEART RATE: 90 BPM | DIASTOLIC BLOOD PRESSURE: 93 MMHG | SYSTOLIC BLOOD PRESSURE: 159 MMHG | BODY MASS INDEX: 32.44 KG/M2 | TEMPERATURE: 98 F | HEIGHT: 64 IN

## 2025-08-24 DIAGNOSIS — N39.0 FREQUENT UTI: ICD-10-CM

## 2025-08-24 DIAGNOSIS — B37.9 ANTIBIOTIC-INDUCED YEAST INFECTION: ICD-10-CM

## 2025-08-24 DIAGNOSIS — R10.9 FLANK PAIN: Primary | ICD-10-CM

## 2025-08-24 DIAGNOSIS — T36.95XA ANTIBIOTIC-INDUCED YEAST INFECTION: ICD-10-CM

## 2025-08-24 DIAGNOSIS — M54.50 ACUTE RIGHT-SIDED LOW BACK PAIN WITHOUT SCIATICA: ICD-10-CM

## 2025-08-24 DIAGNOSIS — N30.00 ACUTE CYSTITIS WITHOUT HEMATURIA: ICD-10-CM

## 2025-08-24 LAB
BILIRUB UR QL STRIP: NEGATIVE
GLUCOSE UR QL STRIP: NEGATIVE
KETONES UR QL STRIP: NEGATIVE
LEUKOCYTE ESTERASE UR QL STRIP: POSITIVE
PH, POC UA: 6
POC BLOOD, URINE: NEGATIVE
POC NITRATES, URINE: POSITIVE
PROT UR QL STRIP: NEGATIVE
SP GR UR STRIP: 1.01 (ref 1–1.03)
UROBILINOGEN UR STRIP-ACNC: 0.2 (ref 0.1–1.1)

## 2025-08-24 PROCEDURE — 81003 URINALYSIS AUTO W/O SCOPE: CPT | Mod: QW,S$GLB,, | Performed by: NURSE PRACTITIONER

## 2025-08-24 PROCEDURE — 99214 OFFICE O/P EST MOD 30 MIN: CPT | Mod: S$GLB,,, | Performed by: NURSE PRACTITIONER

## 2025-08-24 RX ORDER — CEFUROXIME AXETIL 500 MG/1
500 TABLET ORAL EVERY 12 HOURS
Qty: 14 TABLET | Refills: 0 | Status: SHIPPED | OUTPATIENT
Start: 2025-08-24 | End: 2025-08-31

## 2025-08-24 RX ORDER — FLUCONAZOLE 150 MG/1
TABLET ORAL
Qty: 2 TABLET | Refills: 0 | Status: SHIPPED | OUTPATIENT
Start: 2025-08-24

## 2025-08-24 RX ORDER — LIDOCAINE 50 MG/G
1 PATCH TOPICAL DAILY
Qty: 10 PATCH | Refills: 0 | Status: SHIPPED | OUTPATIENT
Start: 2025-08-24 | End: 2025-09-03

## 2025-08-26 RX ORDER — LOSARTAN POTASSIUM 50 MG/1
50 TABLET ORAL DAILY
Qty: 90 TABLET | Refills: 3 | Status: SHIPPED | OUTPATIENT
Start: 2025-08-26

## 2025-08-28 ENCOUNTER — LAB VISIT (OUTPATIENT)
Dept: LAB | Facility: HOSPITAL | Age: 60
End: 2025-08-28
Attending: INTERNAL MEDICINE
Payer: COMMERCIAL

## 2025-08-28 DIAGNOSIS — J30.2 SEASONAL ALLERGIES: ICD-10-CM

## 2025-08-28 DIAGNOSIS — E11.69 TYPE 2 DIABETES MELLITUS WITH OTHER SPECIFIED COMPLICATION, UNSPECIFIED WHETHER LONG TERM INSULIN USE: ICD-10-CM

## 2025-08-28 DIAGNOSIS — G89.4 CHRONIC PAIN SYNDROME: ICD-10-CM

## 2025-08-28 DIAGNOSIS — M06.00 SERONEGATIVE RHEUMATOID ARTHRITIS: ICD-10-CM

## 2025-08-28 DIAGNOSIS — E55.9 VITAMIN D DEFICIENCY: ICD-10-CM

## 2025-08-28 DIAGNOSIS — G47.00 INSOMNIA, UNSPECIFIED TYPE: ICD-10-CM

## 2025-08-28 DIAGNOSIS — M47.812 CERVICAL SPONDYLOSIS: ICD-10-CM

## 2025-08-28 LAB
25(OH)D3+25(OH)D2 SERPL-MCNC: 32 NG/ML (ref 30–96)
ABSOLUTE EOSINOPHIL (SMH): 0.13 K/UL
ABSOLUTE MONOCYTE (SMH): 0.33 K/UL (ref 0.3–1)
ABSOLUTE NEUTROPHIL COUNT (SMH): 1.3 K/UL (ref 1.8–7.7)
ALBUMIN SERPL-MCNC: 4 G/DL (ref 3.5–5.2)
ALP SERPL-CCNC: 57 UNIT/L (ref 55–135)
ALT SERPL-CCNC: 17 UNIT/L (ref 10–44)
ANION GAP (SMH): 7 MMOL/L (ref 8–16)
AST SERPL-CCNC: 20 UNIT/L (ref 10–40)
BASOPHILS # BLD AUTO: 0.02 K/UL
BASOPHILS NFR BLD AUTO: 0.5 %
BILIRUB SERPL-MCNC: 0.5 MG/DL (ref 0.1–1)
BUN SERPL-MCNC: 17 MG/DL (ref 8–23)
C-REACTIVE PROTEIN (SMH): 0.9 MG/DL
CALCIUM SERPL-MCNC: 9.7 MG/DL (ref 8.7–10.5)
CHLORIDE SERPL-SCNC: 105 MMOL/L (ref 95–110)
CO2 SERPL-SCNC: 31 MMOL/L (ref 23–29)
CREAT SERPL-MCNC: 0.7 MG/DL (ref 0.5–1.4)
ERYTHROCYTE [DISTWIDTH] IN BLOOD BY AUTOMATED COUNT: 14.5 % (ref 11.5–14.5)
ERYTHROCYTE [SEDIMENTATION RATE] IN BLOOD: 15 MM/HR (ref 0–20)
GFR SERPLBLD CREATININE-BSD FMLA CKD-EPI: >60 ML/MIN/1.73/M2
GLUCOSE SERPL-MCNC: 98 MG/DL (ref 70–110)
HCT VFR BLD AUTO: 40.1 % (ref 37–48.5)
HGB BLD-MCNC: 12.7 GM/DL (ref 12–16)
IMM GRANULOCYTES # BLD AUTO: 0.01 K/UL (ref 0–0.04)
IMM GRANULOCYTES NFR BLD AUTO: 0.2 % (ref 0–0.5)
LYMPHOCYTES # BLD AUTO: 2.44 K/UL (ref 1–4.8)
MCH RBC QN AUTO: 27.1 PG (ref 27–31)
MCHC RBC AUTO-ENTMCNC: 31.7 G/DL (ref 32–36)
MCV RBC AUTO: 86 FL (ref 82–98)
NUCLEATED RBC (/100WBC) (SMH): 0 /100 WBC
PLATELET # BLD AUTO: 279 K/UL (ref 150–450)
PMV BLD AUTO: 10 FL (ref 9.2–12.9)
POTASSIUM SERPL-SCNC: 3.7 MMOL/L (ref 3.5–5.1)
PROT SERPL-MCNC: 6.7 GM/DL (ref 6–8.4)
RBC # BLD AUTO: 4.68 M/UL (ref 4–5.4)
RELATIVE EOSINOPHIL (SMH): 3.1 % (ref 0–8)
RELATIVE LYMPHOCYTE (SMH): 57.3 % (ref 18–48)
RELATIVE MONOCYTE (SMH): 7.7 % (ref 4–15)
RELATIVE NEUTROPHIL (SMH): 31.2 % (ref 38–73)
SODIUM SERPL-SCNC: 143 MMOL/L (ref 136–145)
T4 FREE SERPL-MCNC: 0.81 NG/DL (ref 0.71–1.51)
TSH SERPL-ACNC: 1.37 UIU/ML (ref 0.34–5.6)
WBC # BLD AUTO: 4.26 K/UL (ref 3.9–12.7)

## 2025-08-28 PROCEDURE — 36415 COLL VENOUS BLD VENIPUNCTURE: CPT

## 2025-08-28 PROCEDURE — 82040 ASSAY OF SERUM ALBUMIN: CPT

## 2025-08-28 PROCEDURE — 86140 C-REACTIVE PROTEIN: CPT

## 2025-08-28 PROCEDURE — 84443 ASSAY THYROID STIM HORMONE: CPT

## 2025-08-28 PROCEDURE — 84481 FREE ASSAY (FT-3): CPT

## 2025-08-28 PROCEDURE — 86800 THYROGLOBULIN ANTIBODY: CPT

## 2025-08-28 PROCEDURE — 85025 COMPLETE CBC W/AUTO DIFF WBC: CPT

## 2025-08-28 PROCEDURE — 84439 ASSAY OF FREE THYROXINE: CPT

## 2025-08-28 PROCEDURE — 82306 VITAMIN D 25 HYDROXY: CPT

## 2025-08-28 PROCEDURE — 85651 RBC SED RATE NONAUTOMATED: CPT

## 2025-08-28 PROCEDURE — 86376 MICROSOMAL ANTIBODY EACH: CPT

## 2025-08-28 RX ORDER — ROSUVASTATIN CALCIUM 10 MG/1
10 TABLET, COATED ORAL DAILY
Qty: 90 TABLET | Refills: 3 | Status: SHIPPED | OUTPATIENT
Start: 2025-08-28 | End: 2026-08-28

## 2025-08-29 LAB
T3FREE SERPL-MCNC: 3.6 PG/ML (ref 2–4.4)
THYROGLOB AB SERPL-ACNC: <1 IU/ML (ref 0–0.9)
THYROPEROXIDASE AB SERPL-ACNC: 15 IU/ML (ref 0–34)

## 2025-09-03 ENCOUNTER — OFFICE VISIT (OUTPATIENT)
Dept: CARDIOLOGY | Facility: CLINIC | Age: 60
End: 2025-09-03
Payer: COMMERCIAL

## 2025-09-03 VITALS
HEART RATE: 63 BPM | DIASTOLIC BLOOD PRESSURE: 90 MMHG | OXYGEN SATURATION: 97 % | BODY MASS INDEX: 33.47 KG/M2 | SYSTOLIC BLOOD PRESSURE: 142 MMHG | WEIGHT: 195 LBS

## 2025-09-03 DIAGNOSIS — G89.4 CHRONIC PAIN SYNDROME: ICD-10-CM

## 2025-09-03 DIAGNOSIS — J30.2 SEASONAL ALLERGIES: ICD-10-CM

## 2025-09-03 DIAGNOSIS — M47.812 CERVICAL SPONDYLOSIS: ICD-10-CM

## 2025-09-03 DIAGNOSIS — M06.00 SERONEGATIVE RHEUMATOID ARTHRITIS: ICD-10-CM

## 2025-09-03 DIAGNOSIS — E78.5 DYSLIPIDEMIA: ICD-10-CM

## 2025-09-03 DIAGNOSIS — I10 ESSENTIAL HYPERTENSION: ICD-10-CM

## 2025-09-03 PROCEDURE — 4010F ACE/ARB THERAPY RXD/TAKEN: CPT | Mod: CPTII,S$GLB,, | Performed by: NURSE PRACTITIONER

## 2025-09-03 PROCEDURE — 3080F DIAST BP >= 90 MM HG: CPT | Mod: CPTII,S$GLB,, | Performed by: NURSE PRACTITIONER

## 2025-09-03 PROCEDURE — 3044F HG A1C LEVEL LT 7.0%: CPT | Mod: CPTII,S$GLB,, | Performed by: NURSE PRACTITIONER

## 2025-09-03 PROCEDURE — 99999 PR PBB SHADOW E&M-EST. PATIENT-LVL IV: CPT | Mod: PBBFAC,,, | Performed by: NURSE PRACTITIONER

## 2025-09-03 PROCEDURE — 3008F BODY MASS INDEX DOCD: CPT | Mod: CPTII,S$GLB,, | Performed by: NURSE PRACTITIONER

## 2025-09-03 PROCEDURE — 99214 OFFICE O/P EST MOD 30 MIN: CPT | Mod: S$GLB,,, | Performed by: NURSE PRACTITIONER

## 2025-09-03 PROCEDURE — 1159F MED LIST DOCD IN RCRD: CPT | Mod: CPTII,S$GLB,, | Performed by: NURSE PRACTITIONER

## 2025-09-03 PROCEDURE — 3077F SYST BP >= 140 MM HG: CPT | Mod: CPTII,S$GLB,, | Performed by: NURSE PRACTITIONER

## 2025-09-03 RX ORDER — METOPROLOL TARTRATE 25 MG/1
25 TABLET, FILM COATED ORAL 2 TIMES DAILY PRN
Qty: 180 TABLET | Refills: 3 | Status: SHIPPED | OUTPATIENT
Start: 2025-09-03

## 2025-09-03 RX ORDER — CETIRIZINE HYDROCHLORIDE 10 MG/1
10 TABLET ORAL DAILY
Qty: 90 TABLET | Refills: 3 | Status: SHIPPED | OUTPATIENT
Start: 2025-09-03

## 2025-09-03 RX ORDER — HYDROCHLOROTHIAZIDE 25 MG/1
25 TABLET ORAL DAILY
Qty: 90 TABLET | Refills: 3 | Status: SHIPPED | OUTPATIENT
Start: 2025-09-03 | End: 2026-09-03

## 2025-09-03 RX ORDER — ROSUVASTATIN CALCIUM 10 MG/1
10 TABLET, COATED ORAL DAILY
Qty: 90 TABLET | Refills: 3 | Status: SHIPPED | OUTPATIENT
Start: 2025-09-03 | End: 2026-09-03

## 2025-09-03 RX ORDER — LOSARTAN POTASSIUM 50 MG/1
50 TABLET ORAL DAILY
Qty: 90 TABLET | Refills: 3 | Status: SHIPPED | OUTPATIENT
Start: 2025-09-03